# Patient Record
Sex: FEMALE | Race: WHITE | Employment: OTHER | URBAN - METROPOLITAN AREA
[De-identification: names, ages, dates, MRNs, and addresses within clinical notes are randomized per-mention and may not be internally consistent; named-entity substitution may affect disease eponyms.]

---

## 2017-01-25 ENCOUNTER — OFFICE VISIT (OUTPATIENT)
Dept: FAMILY MEDICINE CLINIC | Age: 72
End: 2017-01-25

## 2017-01-25 VITALS
DIASTOLIC BLOOD PRESSURE: 62 MMHG | OXYGEN SATURATION: 97 % | HEART RATE: 100 BPM | SYSTOLIC BLOOD PRESSURE: 144 MMHG | WEIGHT: 151.2 LBS | TEMPERATURE: 98.1 F | BODY MASS INDEX: 25.95 KG/M2

## 2017-01-25 DIAGNOSIS — B02.9 HERPES ZOSTER WITHOUT COMPLICATION: Primary | ICD-10-CM

## 2017-01-25 PROCEDURE — 1123F ACP DISCUSS/DSCN MKR DOCD: CPT | Performed by: FAMILY MEDICINE

## 2017-01-25 PROCEDURE — 99212 OFFICE O/P EST SF 10 MIN: CPT | Performed by: FAMILY MEDICINE

## 2017-01-25 PROCEDURE — 3014F SCREEN MAMMO DOC REV: CPT | Performed by: FAMILY MEDICINE

## 2017-01-25 PROCEDURE — 3017F COLORECTAL CA SCREEN DOC REV: CPT | Performed by: FAMILY MEDICINE

## 2017-01-25 PROCEDURE — G8484 FLU IMMUNIZE NO ADMIN: HCPCS | Performed by: FAMILY MEDICINE

## 2017-01-25 PROCEDURE — 4040F PNEUMOC VAC/ADMIN/RCVD: CPT | Performed by: FAMILY MEDICINE

## 2017-01-25 PROCEDURE — 1090F PRES/ABSN URINE INCON ASSESS: CPT | Performed by: FAMILY MEDICINE

## 2017-01-25 PROCEDURE — G8420 CALC BMI NORM PARAMETERS: HCPCS | Performed by: FAMILY MEDICINE

## 2017-01-25 PROCEDURE — G8400 PT W/DXA NO RESULTS DOC: HCPCS | Performed by: FAMILY MEDICINE

## 2017-01-25 PROCEDURE — G8427 DOCREV CUR MEDS BY ELIG CLIN: HCPCS | Performed by: FAMILY MEDICINE

## 2017-01-25 PROCEDURE — 4004F PT TOBACCO SCREEN RCVD TLK: CPT | Performed by: FAMILY MEDICINE

## 2017-01-25 RX ORDER — METOPROLOL SUCCINATE 25 MG/1
TABLET, EXTENDED RELEASE ORAL
Qty: 90 TABLET | Refills: 3 | Status: SHIPPED | OUTPATIENT
Start: 2017-01-25 | End: 2018-03-06 | Stop reason: SDUPTHER

## 2017-02-17 ENCOUNTER — PRE-PROCEDURE TELEPHONE (OUTPATIENT)
Dept: INTERVENTIONAL RADIOLOGY/VASCULAR | Age: 72
End: 2017-02-17

## 2017-03-14 PROBLEM — N13.30 HYDRONEPHROSIS: Status: ACTIVE | Noted: 2017-03-14

## 2017-03-15 ENCOUNTER — PRE-PROCEDURE TELEPHONE (OUTPATIENT)
Dept: INTERVENTIONAL RADIOLOGY/VASCULAR | Age: 72
End: 2017-03-15

## 2017-03-22 ENCOUNTER — HOSPITAL ENCOUNTER (OUTPATIENT)
Dept: INTERVENTIONAL RADIOLOGY/VASCULAR | Age: 72
Discharge: HOME | End: 2017-03-22
Attending: UROLOGY | Admitting: UROLOGY

## 2017-03-22 VITALS
HEART RATE: 75 BPM | WEIGHT: 156 LBS | OXYGEN SATURATION: 96 % | BODY MASS INDEX: 26.63 KG/M2 | DIASTOLIC BLOOD PRESSURE: 58 MMHG | SYSTOLIC BLOOD PRESSURE: 114 MMHG | RESPIRATION RATE: 16 BRPM | TEMPERATURE: 97.5 F | HEIGHT: 64 IN

## 2017-03-22 DIAGNOSIS — N20.0 RENAL CALCULI: ICD-10-CM

## 2017-03-22 LAB
A/G RATIO: 1.1 (ref 1.1–2.2)
ALBUMIN SERPL-MCNC: 3.9 G/DL (ref 3.4–5)
ALP BLD-CCNC: 155 U/L (ref 40–129)
ALT SERPL-CCNC: 12 U/L (ref 10–40)
ANION GAP SERPL CALCULATED.3IONS-SCNC: 13 MMOL/L (ref 3–16)
APTT: 30 SEC (ref 21–31.8)
AST SERPL-CCNC: 21 U/L (ref 15–37)
BASOPHILS ABSOLUTE: 0 K/UL (ref 0–0.2)
BASOPHILS RELATIVE PERCENT: 0.6 %
BILIRUB SERPL-MCNC: <0.2 MG/DL (ref 0–1)
BUN BLDV-MCNC: 19 MG/DL (ref 7–20)
CALCIUM SERPL-MCNC: 8.7 MG/DL (ref 8.3–10.6)
CHLORIDE BLD-SCNC: 103 MMOL/L (ref 99–110)
CO2: 24 MMOL/L (ref 21–32)
CREAT SERPL-MCNC: 1.7 MG/DL (ref 0.6–1.2)
EOSINOPHILS ABSOLUTE: 0.2 K/UL (ref 0–0.6)
EOSINOPHILS RELATIVE PERCENT: 4.4 %
GFR AFRICAN AMERICAN: 36
GFR NON-AFRICAN AMERICAN: 29
GLOBULIN: 3.7 G/DL
GLUCOSE BLD-MCNC: 102 MG/DL (ref 70–99)
GLUCOSE BLD-MCNC: 99 MG/DL (ref 70–99)
HCT VFR BLD CALC: 40.1 % (ref 36–48)
HEMOGLOBIN: 13.2 G/DL (ref 12–16)
INR BLD: 1.03 (ref 0.85–1.15)
LYMPHOCYTES ABSOLUTE: 1.6 K/UL (ref 1–5.1)
LYMPHOCYTES RELATIVE PERCENT: 31.8 %
MCH RBC QN AUTO: 31.8 PG (ref 26–34)
MCHC RBC AUTO-ENTMCNC: 32.9 G/DL (ref 31–36)
MCV RBC AUTO: 96.6 FL (ref 80–100)
MONOCYTES ABSOLUTE: 0.5 K/UL (ref 0–1.3)
MONOCYTES RELATIVE PERCENT: 10 %
NEUTROPHILS ABSOLUTE: 2.7 K/UL (ref 1.7–7.7)
NEUTROPHILS RELATIVE PERCENT: 53.2 %
PDW BLD-RTO: 13.1 % (ref 12.4–15.4)
PERFORMED ON: NORMAL
PLATELET # BLD: 176 K/UL (ref 135–450)
PMV BLD AUTO: 11.1 FL (ref 5–10.5)
POTASSIUM SERPL-SCNC: 4.1 MMOL/L (ref 3.5–5.1)
PROTHROMBIN TIME: 11.6 SEC (ref 9.6–13)
RBC # BLD: 4.15 M/UL (ref 4–5.2)
SODIUM BLD-SCNC: 140 MMOL/L (ref 136–145)
TOTAL PROTEIN: 7.6 G/DL (ref 6.4–8.2)
WBC # BLD: 5 K/UL (ref 4–11)

## 2017-03-22 PROCEDURE — 93010 ELECTROCARDIOGRAM REPORT: CPT | Performed by: INTERNAL MEDICINE

## 2017-03-22 RX ORDER — SODIUM CHLORIDE 9 MG/ML
INJECTION, SOLUTION INTRAVENOUS CONTINUOUS
Status: DISCONTINUED | OUTPATIENT
Start: 2017-03-22 | End: 2017-03-23 | Stop reason: HOSPADM

## 2017-03-22 RX ORDER — SODIUM CHLORIDE 0.9 % (FLUSH) 0.9 %
10 SYRINGE (ML) INJECTION PRN
Status: DISCONTINUED | OUTPATIENT
Start: 2017-03-22 | End: 2017-03-23 | Stop reason: HOSPADM

## 2017-03-22 RX ORDER — FENTANYL CITRATE 50 UG/ML
25 INJECTION, SOLUTION INTRAMUSCULAR; INTRAVENOUS EVERY 5 MIN PRN
Status: DISCONTINUED | OUTPATIENT
Start: 2017-03-22 | End: 2017-03-23 | Stop reason: HOSPADM

## 2017-03-22 RX ORDER — MORPHINE SULFATE 2 MG/ML
1 INJECTION, SOLUTION INTRAMUSCULAR; INTRAVENOUS EVERY 5 MIN PRN
Status: DISCONTINUED | OUTPATIENT
Start: 2017-03-22 | End: 2017-03-23 | Stop reason: HOSPADM

## 2017-03-22 RX ORDER — SODIUM CHLORIDE 0.9 % (FLUSH) 0.9 %
10 SYRINGE (ML) INJECTION EVERY 12 HOURS SCHEDULED
Status: DISCONTINUED | OUTPATIENT
Start: 2017-03-22 | End: 2017-03-23 | Stop reason: HOSPADM

## 2017-03-22 RX ORDER — DIPHENHYDRAMINE HYDROCHLORIDE 50 MG/ML
25 INJECTION INTRAMUSCULAR; INTRAVENOUS PRN
Status: DISCONTINUED | OUTPATIENT
Start: 2017-03-22 | End: 2017-03-23 | Stop reason: HOSPADM

## 2017-03-22 RX ORDER — HYDROMORPHONE HCL 110MG/55ML
0.5 PATIENT CONTROLLED ANALGESIA SYRINGE INTRAVENOUS EVERY 5 MIN PRN
Status: DISCONTINUED | OUTPATIENT
Start: 2017-03-22 | End: 2017-03-23 | Stop reason: HOSPADM

## 2017-03-22 RX ORDER — ONDANSETRON 2 MG/ML
4 INJECTION INTRAMUSCULAR; INTRAVENOUS
Status: ACTIVE | OUTPATIENT
Start: 2017-03-22 | End: 2017-03-22

## 2017-03-22 ASSESSMENT — PAIN SCALES - GENERAL: PAINLEVEL_OUTOF10: 0

## 2017-03-22 ASSESSMENT — PAIN - FUNCTIONAL ASSESSMENT: PAIN_FUNCTIONAL_ASSESSMENT: 0-10

## 2017-03-22 ASSESSMENT — COPD QUESTIONNAIRES: CAT_SEVERITY: MODERATE

## 2017-03-23 LAB
EKG ATRIAL RATE: 58 BPM
EKG DIAGNOSIS: NORMAL
EKG P AXIS: 31 DEGREES
EKG P-R INTERVAL: 186 MS
EKG Q-T INTERVAL: 428 MS
EKG QRS DURATION: 98 MS
EKG QTC CALCULATION (BAZETT): 420 MS
EKG R AXIS: 39 DEGREES
EKG T AXIS: 78 DEGREES
EKG VENTRICULAR RATE: 58 BPM

## 2017-03-29 ENCOUNTER — HOSPITAL ENCOUNTER (OUTPATIENT)
Dept: INTERVENTIONAL RADIOLOGY/VASCULAR | Age: 72
Discharge: HOME | End: 2017-03-29
Attending: UROLOGY | Admitting: UROLOGY

## 2017-03-29 DIAGNOSIS — N32.0 BLADDER NECK STRICTURE: ICD-10-CM

## 2017-07-18 ENCOUNTER — OFFICE VISIT (OUTPATIENT)
Dept: FAMILY MEDICINE CLINIC | Age: 72
End: 2017-07-18

## 2017-07-18 VITALS
BODY MASS INDEX: 24.72 KG/M2 | DIASTOLIC BLOOD PRESSURE: 80 MMHG | WEIGHT: 144 LBS | SYSTOLIC BLOOD PRESSURE: 130 MMHG | TEMPERATURE: 97.3 F

## 2017-07-18 DIAGNOSIS — L23.9 ALLERGIC CONTACT DERMATITIS, UNSPECIFIED TRIGGER: Primary | ICD-10-CM

## 2017-07-18 DIAGNOSIS — H92.01 OTALGIA, RIGHT: ICD-10-CM

## 2017-07-18 DIAGNOSIS — J30.2 SEASONAL ALLERGIC RHINITIS, UNSPECIFIED ALLERGIC RHINITIS TRIGGER: ICD-10-CM

## 2017-07-18 PROCEDURE — 1090F PRES/ABSN URINE INCON ASSESS: CPT | Performed by: PHYSICIAN ASSISTANT

## 2017-07-18 PROCEDURE — 99213 OFFICE O/P EST LOW 20 MIN: CPT | Performed by: PHYSICIAN ASSISTANT

## 2017-07-18 PROCEDURE — 3017F COLORECTAL CA SCREEN DOC REV: CPT | Performed by: PHYSICIAN ASSISTANT

## 2017-07-18 PROCEDURE — 3014F SCREEN MAMMO DOC REV: CPT | Performed by: PHYSICIAN ASSISTANT

## 2017-07-18 PROCEDURE — G8427 DOCREV CUR MEDS BY ELIG CLIN: HCPCS | Performed by: PHYSICIAN ASSISTANT

## 2017-07-18 PROCEDURE — 4040F PNEUMOC VAC/ADMIN/RCVD: CPT | Performed by: PHYSICIAN ASSISTANT

## 2017-07-18 PROCEDURE — G8420 CALC BMI NORM PARAMETERS: HCPCS | Performed by: PHYSICIAN ASSISTANT

## 2017-07-18 PROCEDURE — 1123F ACP DISCUSS/DSCN MKR DOCD: CPT | Performed by: PHYSICIAN ASSISTANT

## 2017-07-18 PROCEDURE — 4004F PT TOBACCO SCREEN RCVD TLK: CPT | Performed by: PHYSICIAN ASSISTANT

## 2017-07-18 PROCEDURE — G8400 PT W/DXA NO RESULTS DOC: HCPCS | Performed by: PHYSICIAN ASSISTANT

## 2017-07-18 RX ORDER — PREDNISONE 10 MG/1
TABLET ORAL
Qty: 30 TABLET | Refills: 0 | Status: SHIPPED | OUTPATIENT
Start: 2017-07-18 | End: 2018-03-06 | Stop reason: ALTCHOICE

## 2017-07-18 ASSESSMENT — ENCOUNTER SYMPTOMS
SHORTNESS OF BREATH: 1
DIARRHEA: 0
SINUS PRESSURE: 0
WHEEZING: 0
SORE THROAT: 0
NAUSEA: 0
TROUBLE SWALLOWING: 0
COUGH: 1
EYE ITCHING: 1
RHINORRHEA: 1
VOMITING: 0

## 2017-10-18 PROBLEM — R10.32 LEFT LOWER QUADRANT PAIN: Status: ACTIVE | Noted: 2017-10-18

## 2017-10-20 ENCOUNTER — HOSPITAL ENCOUNTER (OUTPATIENT)
Dept: CT IMAGING | Age: 72
Discharge: HOME | End: 2017-10-20
Attending: FAMILY MEDICINE | Admitting: FAMILY MEDICINE

## 2017-10-20 DIAGNOSIS — R10.30 LOWER ABDOMINAL PAIN: ICD-10-CM

## 2017-10-20 LAB
GFR AFRICAN AMERICAN: 31
GFR NON-AFRICAN AMERICAN: 26
PERFORMED ON: ABNORMAL
POC CREATININE: 1.9 MG/DL (ref 0.6–1.2)
POC SAMPLE TYPE: ABNORMAL

## 2018-03-06 ENCOUNTER — OFFICE VISIT (OUTPATIENT)
Dept: FAMILY MEDICINE CLINIC | Age: 73
End: 2018-03-06

## 2018-03-06 VITALS
DIASTOLIC BLOOD PRESSURE: 80 MMHG | SYSTOLIC BLOOD PRESSURE: 124 MMHG | BODY MASS INDEX: 20.49 KG/M2 | WEIGHT: 123 LBS | OXYGEN SATURATION: 95 % | HEART RATE: 109 BPM | HEIGHT: 65 IN

## 2018-03-06 DIAGNOSIS — M25.551 RIGHT HIP PAIN: ICD-10-CM

## 2018-03-06 DIAGNOSIS — J45.20 INTERMITTENT ASTHMA WITHOUT COMPLICATION, UNSPECIFIED ASTHMA SEVERITY: Primary | ICD-10-CM

## 2018-03-06 DIAGNOSIS — Z85.51 HISTORY OF PRIMARY BLADDER CANCER: ICD-10-CM

## 2018-03-06 DIAGNOSIS — R73.02 IMPAIRED GLUCOSE TOLERANCE: ICD-10-CM

## 2018-03-06 DIAGNOSIS — S09.90XA INJURY OF HEAD, INITIAL ENCOUNTER: ICD-10-CM

## 2018-03-06 DIAGNOSIS — S00.83XA CONTUSION OF CHIN, INITIAL ENCOUNTER: ICD-10-CM

## 2018-03-06 DIAGNOSIS — S20.211A CONTUSION OF RIB ON RIGHT SIDE, INITIAL ENCOUNTER: ICD-10-CM

## 2018-03-06 DIAGNOSIS — F33.40 RECURRENT MAJOR DEPRESSIVE DISORDER, IN REMISSION (HCC): ICD-10-CM

## 2018-03-06 DIAGNOSIS — I10 ESSENTIAL HYPERTENSION: ICD-10-CM

## 2018-03-06 DIAGNOSIS — R42 DIZZINESS: ICD-10-CM

## 2018-03-06 DIAGNOSIS — Z12.11 SCREEN FOR COLON CANCER: ICD-10-CM

## 2018-03-06 DIAGNOSIS — K59.09 CHRONIC CONSTIPATION: ICD-10-CM

## 2018-03-06 PROBLEM — R10.32 LEFT LOWER QUADRANT PAIN: Status: RESOLVED | Noted: 2017-10-18 | Resolved: 2018-03-06

## 2018-03-06 PROBLEM — N13.30 HYDRONEPHROSIS: Status: RESOLVED | Noted: 2017-03-14 | Resolved: 2018-03-06

## 2018-03-06 PROCEDURE — G8427 DOCREV CUR MEDS BY ELIG CLIN: HCPCS | Performed by: PHYSICIAN ASSISTANT

## 2018-03-06 PROCEDURE — G8400 PT W/DXA NO RESULTS DOC: HCPCS | Performed by: PHYSICIAN ASSISTANT

## 2018-03-06 PROCEDURE — 1090F PRES/ABSN URINE INCON ASSESS: CPT | Performed by: PHYSICIAN ASSISTANT

## 2018-03-06 PROCEDURE — 4004F PT TOBACCO SCREEN RCVD TLK: CPT | Performed by: PHYSICIAN ASSISTANT

## 2018-03-06 PROCEDURE — 3017F COLORECTAL CA SCREEN DOC REV: CPT | Performed by: PHYSICIAN ASSISTANT

## 2018-03-06 PROCEDURE — 1123F ACP DISCUSS/DSCN MKR DOCD: CPT | Performed by: PHYSICIAN ASSISTANT

## 2018-03-06 PROCEDURE — 99214 OFFICE O/P EST MOD 30 MIN: CPT | Performed by: PHYSICIAN ASSISTANT

## 2018-03-06 PROCEDURE — G8420 CALC BMI NORM PARAMETERS: HCPCS | Performed by: PHYSICIAN ASSISTANT

## 2018-03-06 PROCEDURE — 4040F PNEUMOC VAC/ADMIN/RCVD: CPT | Performed by: PHYSICIAN ASSISTANT

## 2018-03-06 PROCEDURE — G8484 FLU IMMUNIZE NO ADMIN: HCPCS | Performed by: PHYSICIAN ASSISTANT

## 2018-03-06 RX ORDER — ALBUTEROL SULFATE 90 UG/1
2 AEROSOL, METERED RESPIRATORY (INHALATION) EVERY 6 HOURS PRN
Qty: 1 INHALER | Refills: 3 | Status: SHIPPED | OUTPATIENT
Start: 2018-03-06 | End: 2018-09-11

## 2018-03-06 RX ORDER — AMOXICILLIN 250 MG
1 CAPSULE ORAL 2 TIMES DAILY
Qty: 60 TABLET | Refills: 5 | Status: SHIPPED | OUTPATIENT
Start: 2018-03-06 | End: 2018-09-11

## 2018-03-06 RX ORDER — VENLAFAXINE 75 MG/1
75 TABLET ORAL 2 TIMES DAILY
Qty: 60 TABLET | Refills: 5 | Status: SHIPPED | OUTPATIENT
Start: 2018-03-06 | End: 2019-07-10 | Stop reason: SDUPTHER

## 2018-03-06 RX ORDER — METOPROLOL SUCCINATE 25 MG/1
TABLET, EXTENDED RELEASE ORAL
Qty: 90 TABLET | Refills: 3 | Status: ON HOLD | OUTPATIENT
Start: 2018-03-06 | End: 2020-01-14 | Stop reason: SDDI

## 2018-03-06 ASSESSMENT — ENCOUNTER SYMPTOMS
VOICE CHANGE: 0
CHEST TIGHTNESS: 0
ABDOMINAL PAIN: 0
EYE PAIN: 0
BACK PAIN: 0
CONSTIPATION: 0
SORE THROAT: 0
SHORTNESS OF BREATH: 0
COUGH: 0
COLOR CHANGE: 1
DIARRHEA: 0
TROUBLE SWALLOWING: 0

## 2018-03-06 ASSESSMENT — PATIENT HEALTH QUESTIONNAIRE - PHQ9
1. LITTLE INTEREST OR PLEASURE IN DOING THINGS: 1
2. FEELING DOWN, DEPRESSED OR HOPELESS: 1
SUM OF ALL RESPONSES TO PHQ QUESTIONS 1-9: 2
SUM OF ALL RESPONSES TO PHQ9 QUESTIONS 1 & 2: 2

## 2018-03-07 ENCOUNTER — TELEPHONE (OUTPATIENT)
Dept: FAMILY MEDICINE CLINIC | Age: 73
End: 2018-03-07

## 2018-03-07 ENCOUNTER — HOSPITAL ENCOUNTER (OUTPATIENT)
Dept: CT IMAGING | Age: 73
Discharge: HOME | End: 2018-03-07
Attending: PHYSICIAN ASSISTANT | Admitting: PHYSICIAN ASSISTANT

## 2018-03-07 DIAGNOSIS — R07.81 RIB PAIN ON RIGHT SIDE: ICD-10-CM

## 2018-03-07 DIAGNOSIS — S09.90XA INJURY OF HEAD, INITIAL ENCOUNTER: Primary | ICD-10-CM

## 2018-03-07 DIAGNOSIS — R42 DIZZINESS: ICD-10-CM

## 2018-03-07 DIAGNOSIS — S09.90XA INJURY OF HEAD: ICD-10-CM

## 2018-03-07 DIAGNOSIS — M25.551 RIGHT HIP PAIN: ICD-10-CM

## 2018-03-07 DIAGNOSIS — S09.90XA INJURY OF HEAD, INITIAL ENCOUNTER: ICD-10-CM

## 2018-03-07 LAB
A/G RATIO: 0.7 (ref 1.1–2.2)
ALBUMIN SERPL-MCNC: 3.2 G/DL (ref 3.4–5)
ALP BLD-CCNC: 141 U/L (ref 40–129)
ALT SERPL-CCNC: 16 U/L (ref 10–40)
ANION GAP SERPL CALCULATED.3IONS-SCNC: 16 MMOL/L (ref 3–16)
AST SERPL-CCNC: 27 U/L (ref 15–37)
BASOPHILS ABSOLUTE: 0.1 K/UL (ref 0–0.2)
BASOPHILS RELATIVE PERCENT: 1.5 %
BILIRUB SERPL-MCNC: 0.5 MG/DL (ref 0–1)
BUN BLDV-MCNC: 30 MG/DL (ref 7–20)
CALCIUM SERPL-MCNC: 8.9 MG/DL (ref 8.3–10.6)
CHLORIDE BLD-SCNC: 98 MMOL/L (ref 99–110)
CHOLESTEROL, TOTAL: 133 MG/DL (ref 0–199)
CO2: 24 MMOL/L (ref 21–32)
CREAT SERPL-MCNC: 1.8 MG/DL (ref 0.6–1.2)
CREATININE URINE: 123.2 MG/DL (ref 28–259)
EOSINOPHILS ABSOLUTE: 0.4 K/UL (ref 0–0.6)
EOSINOPHILS RELATIVE PERCENT: 4.7 %
ESTIMATED AVERAGE GLUCOSE: 99.7 MG/DL
GFR AFRICAN AMERICAN: 33
GFR NON-AFRICAN AMERICAN: 27
GLOBULIN: 4.7 G/DL
GLUCOSE BLD-MCNC: 105 MG/DL (ref 70–99)
HBA1C MFR BLD: 5.1 %
HCT VFR BLD CALC: 38.6 % (ref 36–48)
HDLC SERPL-MCNC: 49 MG/DL (ref 40–60)
HEMOGLOBIN: 13.1 G/DL (ref 12–16)
LDL CHOLESTEROL CALCULATED: 59 MG/DL
LYMPHOCYTES ABSOLUTE: 2.7 K/UL (ref 1–5.1)
LYMPHOCYTES RELATIVE PERCENT: 33.1 %
MCH RBC QN AUTO: 32.4 PG (ref 26–34)
MCHC RBC AUTO-ENTMCNC: 33.9 G/DL (ref 31–36)
MCV RBC AUTO: 95.4 FL (ref 80–100)
MICROALBUMIN UR-MCNC: 86.9 MG/DL
MICROALBUMIN/CREAT UR-RTO: 705.4 MG/G (ref 0–30)
MONOCYTES ABSOLUTE: 0.6 K/UL (ref 0–1.3)
MONOCYTES RELATIVE PERCENT: 7.8 %
NEUTROPHILS ABSOLUTE: 4.4 K/UL (ref 1.7–7.7)
NEUTROPHILS RELATIVE PERCENT: 52.9 %
PDW BLD-RTO: 13.3 % (ref 12.4–15.4)
PLATELET # BLD: 246 K/UL (ref 135–450)
PMV BLD AUTO: 10.6 FL (ref 5–10.5)
POTASSIUM SERPL-SCNC: 4 MMOL/L (ref 3.5–5.1)
RBC # BLD: 4.05 M/UL (ref 4–5.2)
SODIUM BLD-SCNC: 138 MMOL/L (ref 136–145)
TOTAL PROTEIN: 7.9 G/DL (ref 6.4–8.2)
TRIGL SERPL-MCNC: 127 MG/DL (ref 0–150)
TSH SERPL DL<=0.05 MIU/L-ACNC: 4.01 UIU/ML (ref 0.27–4.2)
VLDLC SERPL CALC-MCNC: 25 MG/DL
WBC # BLD: 8.2 K/UL (ref 4–11)

## 2018-03-07 NOTE — TELEPHONE ENCOUNTER
Advised pt to head to the hospital to get all her testing done and that they are squeezing her in for CT head. Order put in [address] [place] [address] Rd.

## 2018-03-08 ENCOUNTER — TELEPHONE (OUTPATIENT)
Dept: FAMILY MEDICINE CLINIC | Age: 73
End: 2018-03-08

## 2018-03-08 DIAGNOSIS — R77.8 ABNORMAL SERUM PROTEIN TEST: ICD-10-CM

## 2018-03-08 DIAGNOSIS — C65.2 TRANSITIONAL CELL CARCINOMA OF LEFT RENAL PELVIS (HCC): Primary | ICD-10-CM

## 2018-09-11 ENCOUNTER — OFFICE VISIT (OUTPATIENT)
Dept: FAMILY MEDICINE CLINIC | Age: 73
End: 2018-09-11

## 2018-09-11 VITALS
DIASTOLIC BLOOD PRESSURE: 68 MMHG | HEART RATE: 58 BPM | SYSTOLIC BLOOD PRESSURE: 138 MMHG | WEIGHT: 129.6 LBS | BODY MASS INDEX: 21.9 KG/M2

## 2018-09-11 DIAGNOSIS — Z87.891 PERSONAL HISTORY OF TOBACCO USE: ICD-10-CM

## 2018-09-11 DIAGNOSIS — M85.80 OSTEOPENIA, UNSPECIFIED LOCATION: ICD-10-CM

## 2018-09-11 DIAGNOSIS — Z12.11 COLON CANCER SCREENING: ICD-10-CM

## 2018-09-11 DIAGNOSIS — E53.8 B12 DEFICIENCY: ICD-10-CM

## 2018-09-11 DIAGNOSIS — E78.49 OTHER HYPERLIPIDEMIA: ICD-10-CM

## 2018-09-11 DIAGNOSIS — M81.0 AGE-RELATED OSTEOPOROSIS WITHOUT CURRENT PATHOLOGICAL FRACTURE: ICD-10-CM

## 2018-09-11 DIAGNOSIS — R73.01 IMPAIRED FASTING GLUCOSE: Primary | ICD-10-CM

## 2018-09-11 PROCEDURE — 1090F PRES/ABSN URINE INCON ASSESS: CPT | Performed by: INTERNAL MEDICINE

## 2018-09-11 PROCEDURE — 1123F ACP DISCUSS/DSCN MKR DOCD: CPT | Performed by: INTERNAL MEDICINE

## 2018-09-11 PROCEDURE — G8400 PT W/DXA NO RESULTS DOC: HCPCS | Performed by: INTERNAL MEDICINE

## 2018-09-11 PROCEDURE — 99214 OFFICE O/P EST MOD 30 MIN: CPT | Performed by: INTERNAL MEDICINE

## 2018-09-11 PROCEDURE — G8420 CALC BMI NORM PARAMETERS: HCPCS | Performed by: INTERNAL MEDICINE

## 2018-09-11 PROCEDURE — 4004F PT TOBACCO SCREEN RCVD TLK: CPT | Performed by: INTERNAL MEDICINE

## 2018-09-11 PROCEDURE — 1101F PT FALLS ASSESS-DOCD LE1/YR: CPT | Performed by: INTERNAL MEDICINE

## 2018-09-11 PROCEDURE — 4040F PNEUMOC VAC/ADMIN/RCVD: CPT | Performed by: INTERNAL MEDICINE

## 2018-09-11 PROCEDURE — G8427 DOCREV CUR MEDS BY ELIG CLIN: HCPCS | Performed by: INTERNAL MEDICINE

## 2018-09-11 PROCEDURE — 3017F COLORECTAL CA SCREEN DOC REV: CPT | Performed by: INTERNAL MEDICINE

## 2018-09-11 RX ORDER — CYANOCOBALAMIN (VITAMIN B-12) 1000 MCG
1000 TABLET, EXTENDED RELEASE ORAL DAILY
Qty: 90 TABLET | Refills: 3 | Status: SHIPPED | OUTPATIENT
Start: 2018-09-11 | End: 2019-03-22 | Stop reason: ALTCHOICE

## 2018-09-11 ASSESSMENT — ENCOUNTER SYMPTOMS
VOMITING: 0
CONSTIPATION: 0
COUGH: 1
CHEST TIGHTNESS: 0
ABDOMINAL PAIN: 0
SINUS PRESSURE: 0
NAUSEA: 0
SHORTNESS OF BREATH: 0
DIARRHEA: 0

## 2018-09-11 NOTE — PROGRESS NOTES
tenderness. There is no rebound and no guarding. Musculoskeletal: Normal range of motion. She exhibits no edema. Lymphadenopathy:     She has no cervical adenopathy. Neurological: She is alert and oriented to person, place, and time. No cranial nerve deficit. Skin: Skin is warm and dry. No rash noted. She is not diaphoretic. Psychiatric: She has a normal mood and affect. Her behavior is normal.   Vitals reviewed. Visual inspection: Healthy, mild onychomycosis  Deformity/amputation: absent  Skin lesions/pre-ulcerative calluses: absent  Edema: right- negative, left- negative    Sensory exam:  Monofilament sensation: normal  (minimum of 5 random plantar locations tested, avoiding callused areas - > 1 area with absence of sensation is + for neuropathy)    Plus at least one of the following:  Pulses: normal,   Pinprick: N/A      Assessment/Plan    Kathy Saunders was seen today for 6 month follow-up. Diagnoses and all orders for this visit:    Impaired fasting glucose  -     CBC Auto Differential; Future  -     Basic Metabolic Panel; Future  -     Hemoglobin A1C; Future  -     Lipid Panel; Future  -     Microalbumin / Creatinine Urine Ratio; Future  - DM foot exam done    B12 deficiency  -     Vitamin B12 & Folate; Future  -     Cyanocobalamin (VITAMIN B-12) 1000 MCG extended release tablet; Take 1 tablet by mouth daily  Refill B12 script (has not been taking recently)    Osteopenia, unspecified location  -     Vitamin D 25 Hydroxy; Future  -     DEXA Bone Density Axial Skeleton; Future  Mother with osteoporosis    Other hyperlipidemia   -     Lipid Panel; Future  Continue fish oil.  Discuss statins pending repeat lab results  The 10-year ASCVD risk score (Sabine Moreland, et al., 2013) is: 42.5%    Values used to calculate the score:      Age: 74 years      Sex: Female      Is Non- : No      Diabetic: Yes      Tobacco smoker: Yes      Systolic Blood Pressure: 983 mmHg      Is BP treated: No HDL Cholesterol: 49 mg/dL      Total Cholesterol: 133 mg/dL      Personal history of tobacco use  -     Low Dose Chest CT - (3-6 month)  follow-up to CT lung screening only  Not ready to quit smoking, declined smoking cessation information    Colon cancer screening  -     POCT Fecal Immunochemical Test (FIT); Future    Age-related osteoporosis without current pathological fracture   -     DEXA Bone Density Axial Skeleton; Future    Age related vaccines  - Recovering from recent bronchitis requiring PO steroids, readdress at later time    Health Maintenance Due   Topic Date Due    Diabetic retinal exam  01/14/1955    Shingles Vaccine (1 of 2 - 2 Dose Series) 01/14/1993    DTaP/Tdap/Td vaccine (1 - Tdap) 10/05/2006    DEXA (modify frequency per FRAX score)  01/14/2008    Colon cancer screen colonoscopy  01/09/2016    Flu vaccine (1) 09/01/2018           Return in about 3 months (around 12/11/2018).

## 2019-02-26 NOTE — PROGRESS NOTES
Chart(s)/Patient
Consider PT consult due to fall
I have reviewed the history and physical note and findings.
the lungs every 6 hours as needed for Wheezing    Recurrent major depressive disorder, in remission ([facility] [address]  -     venlafaxine (EFFEXOR) 75 MG tablet; Take 1 tablet by mouth 2 times daily    Impaired glucose tolerance    Essential hypertension  -     metoprolol succinate (TOPROL XL) 25 MG extended release tablet; TAKE ONE TABLET BY MOUTH ONCE DAILY    History of primary bladder cancer ( Dr Roberto Cosme)    Chronic constipation  -     senna-docusate (PERICOLACE) 8.6-50 MG per tablet; Take 1 tablet by mouth 2 times daily    Contusion of rib on right side, initial encounter    Contusion of chin, initial encounter    Right hip pain    Injury of head, initial encounter    Dizziness    Screen for colon cancer  -     COLONOSCOPY (Screening)             Plan:      Get ct scan on head, labs and xrays tomorrow. She refused to go today. Refilled meds. HTN and depression, return in 6 months or sooner if needed.  She really needs to get into her urologist.

## 2019-03-22 ENCOUNTER — HOSPITAL ENCOUNTER (INPATIENT)
Age: 74
LOS: 2 days | Discharge: HOME | DRG: 190 | End: 2019-03-24
Attending: EMERGENCY MEDICINE | Admitting: HOSPITALIST
Payer: MEDICARE

## 2019-03-22 ENCOUNTER — APPOINTMENT (OUTPATIENT)
Dept: GENERAL RADIOLOGY | Age: 74
DRG: 190 | End: 2019-03-22
Payer: MEDICARE

## 2019-03-22 DIAGNOSIS — J44.1 COPD WITH ACUTE EXACERBATION (HCC): Primary | ICD-10-CM

## 2019-03-22 DIAGNOSIS — J96.01 ACUTE RESPIRATORY FAILURE WITH HYPOXIA (HCC): ICD-10-CM

## 2019-03-22 LAB
A/G RATIO: 0.8 (ref 1.1–2.2)
ALBUMIN SERPL-MCNC: 3.5 G/DL (ref 3.4–5)
ALP BLD-CCNC: 177 U/L (ref 40–129)
ALT SERPL-CCNC: 12 U/L (ref 10–40)
ANION GAP SERPL CALCULATED.3IONS-SCNC: 11 MMOL/L (ref 3–16)
AST SERPL-CCNC: 23 U/L (ref 15–37)
BASOPHILS ABSOLUTE: 0 K/UL (ref 0–0.2)
BASOPHILS RELATIVE PERCENT: 0.7 %
BILIRUB SERPL-MCNC: <0.2 MG/DL (ref 0–1)
BUN BLDV-MCNC: 18 MG/DL (ref 7–20)
CALCIUM SERPL-MCNC: 8.7 MG/DL (ref 8.3–10.6)
CHLORIDE BLD-SCNC: 104 MMOL/L (ref 99–110)
CO2: 24 MMOL/L (ref 21–32)
CREAT SERPL-MCNC: 1.4 MG/DL (ref 0.6–1.2)
EKG ATRIAL RATE: 74 BPM
EKG DIAGNOSIS: NORMAL
EKG P AXIS: 68 DEGREES
EKG P-R INTERVAL: 176 MS
EKG Q-T INTERVAL: 436 MS
EKG QRS DURATION: 92 MS
EKG QTC CALCULATION (BAZETT): 483 MS
EKG R AXIS: 34 DEGREES
EKG T AXIS: 67 DEGREES
EKG VENTRICULAR RATE: 74 BPM
EOSINOPHILS ABSOLUTE: 0.4 K/UL (ref 0–0.6)
EOSINOPHILS RELATIVE PERCENT: 5.6 %
GFR AFRICAN AMERICAN: 44
GFR NON-AFRICAN AMERICAN: 37
GLOBULIN: 4.3 G/DL
GLUCOSE BLD-MCNC: 114 MG/DL (ref 70–99)
HCT VFR BLD CALC: 39.1 % (ref 36–48)
HEMOGLOBIN: 12.6 G/DL (ref 12–16)
INR BLD: 1.04 (ref 0.86–1.14)
LACTIC ACID, SEPSIS: 0.8 MMOL/L (ref 0.4–1.9)
LYMPHOCYTES ABSOLUTE: 1.2 K/UL (ref 1–5.1)
LYMPHOCYTES RELATIVE PERCENT: 17.9 %
MCH RBC QN AUTO: 31.3 PG (ref 26–34)
MCHC RBC AUTO-ENTMCNC: 32.3 G/DL (ref 31–36)
MCV RBC AUTO: 96.8 FL (ref 80–100)
MONOCYTES ABSOLUTE: 0.5 K/UL (ref 0–1.3)
MONOCYTES RELATIVE PERCENT: 7.1 %
NEUTROPHILS ABSOLUTE: 4.7 K/UL (ref 1.7–7.7)
NEUTROPHILS RELATIVE PERCENT: 68.7 %
PDW BLD-RTO: 13 % (ref 12.4–15.4)
PLATELET # BLD: 218 K/UL (ref 135–450)
PMV BLD AUTO: 10.4 FL (ref 5–10.5)
POTASSIUM REFLEX MAGNESIUM: 4 MMOL/L (ref 3.5–5.1)
PRO-BNP: 1887 PG/ML (ref 0–449)
PROTHROMBIN TIME: 11.9 SEC (ref 9.8–13)
RAPID INFLUENZA  B AGN: NEGATIVE
RAPID INFLUENZA A AGN: NEGATIVE
RBC # BLD: 4.04 M/UL (ref 4–5.2)
SODIUM BLD-SCNC: 139 MMOL/L (ref 136–145)
TOTAL PROTEIN: 7.8 G/DL (ref 6.4–8.2)
TROPONIN: <0.01 NG/ML
WBC # BLD: 6.8 K/UL (ref 4–11)

## 2019-03-22 PROCEDURE — 84484 ASSAY OF TROPONIN QUANT: CPT

## 2019-03-22 PROCEDURE — 6360000002 HC RX W HCPCS: Performed by: PHYSICIAN ASSISTANT

## 2019-03-22 PROCEDURE — 94640 AIRWAY INHALATION TREATMENT: CPT

## 2019-03-22 PROCEDURE — 1200000000 HC SEMI PRIVATE

## 2019-03-22 PROCEDURE — 6370000000 HC RX 637 (ALT 250 FOR IP): Performed by: PHYSICIAN ASSISTANT

## 2019-03-22 PROCEDURE — 83605 ASSAY OF LACTIC ACID: CPT

## 2019-03-22 PROCEDURE — 80053 COMPREHEN METABOLIC PANEL: CPT

## 2019-03-22 PROCEDURE — 71045 X-RAY EXAM CHEST 1 VIEW: CPT

## 2019-03-22 PROCEDURE — 87804 INFLUENZA ASSAY W/OPTIC: CPT

## 2019-03-22 PROCEDURE — 96365 THER/PROPH/DIAG IV INF INIT: CPT

## 2019-03-22 PROCEDURE — 87186 SC STD MICRODIL/AGAR DIL: CPT

## 2019-03-22 PROCEDURE — 2580000003 HC RX 258

## 2019-03-22 PROCEDURE — 6370000000 HC RX 637 (ALT 250 FOR IP): Performed by: HOSPITALIST

## 2019-03-22 PROCEDURE — 96375 TX/PRO/DX INJ NEW DRUG ADDON: CPT

## 2019-03-22 PROCEDURE — 6360000002 HC RX W HCPCS: Performed by: HOSPITALIST

## 2019-03-22 PROCEDURE — 85025 COMPLETE CBC W/AUTO DIFF WBC: CPT

## 2019-03-22 PROCEDURE — 96366 THER/PROPH/DIAG IV INF ADDON: CPT

## 2019-03-22 PROCEDURE — 85610 PROTHROMBIN TIME: CPT

## 2019-03-22 PROCEDURE — 93005 ELECTROCARDIOGRAM TRACING: CPT | Performed by: EMERGENCY MEDICINE

## 2019-03-22 PROCEDURE — 87040 BLOOD CULTURE FOR BACTERIA: CPT

## 2019-03-22 PROCEDURE — 93010 ELECTROCARDIOGRAM REPORT: CPT | Performed by: INTERNAL MEDICINE

## 2019-03-22 PROCEDURE — 2580000003 HC RX 258: Performed by: PHYSICIAN ASSISTANT

## 2019-03-22 PROCEDURE — 94760 N-INVAS EAR/PLS OXIMETRY 1: CPT

## 2019-03-22 PROCEDURE — 83880 ASSAY OF NATRIURETIC PEPTIDE: CPT

## 2019-03-22 PROCEDURE — 87077 CULTURE AEROBIC IDENTIFY: CPT

## 2019-03-22 PROCEDURE — 99285 EMERGENCY DEPT VISIT HI MDM: CPT

## 2019-03-22 PROCEDURE — 87801 DETECT AGNT MULT DNA AMPLI: CPT

## 2019-03-22 RX ORDER — ONDANSETRON 2 MG/ML
4 INJECTION INTRAMUSCULAR; INTRAVENOUS EVERY 6 HOURS PRN
Status: DISCONTINUED | OUTPATIENT
Start: 2019-03-22 | End: 2019-03-24 | Stop reason: HOSPADM

## 2019-03-22 RX ORDER — SODIUM CHLORIDE 0.9 % (FLUSH) 0.9 %
10 SYRINGE (ML) INJECTION EVERY 12 HOURS SCHEDULED
Status: DISCONTINUED | OUTPATIENT
Start: 2019-03-22 | End: 2019-03-24 | Stop reason: HOSPADM

## 2019-03-22 RX ORDER — SODIUM CHLORIDE 9 MG/ML
INJECTION, SOLUTION INTRAVENOUS
Status: COMPLETED
Start: 2019-03-22 | End: 2019-03-22

## 2019-03-22 RX ORDER — IPRATROPIUM BROMIDE AND ALBUTEROL SULFATE 2.5; .5 MG/3ML; MG/3ML
1 SOLUTION RESPIRATORY (INHALATION) ONCE
Status: COMPLETED | OUTPATIENT
Start: 2019-03-22 | End: 2019-03-22

## 2019-03-22 RX ORDER — AZITHROMYCIN 250 MG/1
250 TABLET, FILM COATED ORAL DAILY
Status: DISCONTINUED | OUTPATIENT
Start: 2019-03-24 | End: 2019-03-24 | Stop reason: HOSPADM

## 2019-03-22 RX ORDER — SODIUM CHLORIDE 9 MG/ML
INJECTION, SOLUTION INTRAVENOUS CONTINUOUS
Status: DISCONTINUED | OUTPATIENT
Start: 2019-03-22 | End: 2019-03-24

## 2019-03-22 RX ORDER — ACETAMINOPHEN 500 MG
500 TABLET ORAL EVERY 6 HOURS PRN
Status: DISCONTINUED | OUTPATIENT
Start: 2019-03-22 | End: 2019-03-24 | Stop reason: HOSPADM

## 2019-03-22 RX ORDER — UBIDECARENONE 75 MG
1000 CAPSULE ORAL DAILY
Status: DISCONTINUED | OUTPATIENT
Start: 2019-03-23 | End: 2019-03-24 | Stop reason: HOSPADM

## 2019-03-22 RX ORDER — METHYLPREDNISOLONE SODIUM SUCCINATE 125 MG/2ML
125 INJECTION, POWDER, LYOPHILIZED, FOR SOLUTION INTRAMUSCULAR; INTRAVENOUS ONCE
Status: COMPLETED | OUTPATIENT
Start: 2019-03-22 | End: 2019-03-22

## 2019-03-22 RX ORDER — IPRATROPIUM BROMIDE AND ALBUTEROL SULFATE 2.5; .5 MG/3ML; MG/3ML
1 SOLUTION RESPIRATORY (INHALATION)
Status: DISCONTINUED | OUTPATIENT
Start: 2019-03-22 | End: 2019-03-24 | Stop reason: HOSPADM

## 2019-03-22 RX ORDER — VENLAFAXINE 37.5 MG/1
75 TABLET ORAL 2 TIMES DAILY
Status: DISCONTINUED | OUTPATIENT
Start: 2019-03-22 | End: 2019-03-24 | Stop reason: HOSPADM

## 2019-03-22 RX ORDER — SODIUM CHLORIDE 0.9 % (FLUSH) 0.9 %
10 SYRINGE (ML) INJECTION PRN
Status: DISCONTINUED | OUTPATIENT
Start: 2019-03-22 | End: 2019-03-24 | Stop reason: HOSPADM

## 2019-03-22 RX ORDER — AZITHROMYCIN 500 MG/1
500 INJECTION, POWDER, LYOPHILIZED, FOR SOLUTION INTRAVENOUS ONCE
Status: DISCONTINUED | OUTPATIENT
Start: 2019-03-22 | End: 2019-03-22 | Stop reason: ALTCHOICE

## 2019-03-22 RX ORDER — METHYLPREDNISOLONE SODIUM SUCCINATE 40 MG/ML
40 INJECTION, POWDER, LYOPHILIZED, FOR SOLUTION INTRAMUSCULAR; INTRAVENOUS DAILY
Status: DISCONTINUED | OUTPATIENT
Start: 2019-03-23 | End: 2019-03-24 | Stop reason: HOSPADM

## 2019-03-22 RX ORDER — AZITHROMYCIN 250 MG/1
500 TABLET, FILM COATED ORAL DAILY
Status: COMPLETED | OUTPATIENT
Start: 2019-03-23 | End: 2019-03-23

## 2019-03-22 RX ORDER — METOPROLOL SUCCINATE 25 MG/1
25 TABLET, EXTENDED RELEASE ORAL DAILY
Status: DISCONTINUED | OUTPATIENT
Start: 2019-03-22 | End: 2019-03-24 | Stop reason: HOSPADM

## 2019-03-22 RX ADMIN — IPRATROPIUM BROMIDE AND ALBUTEROL SULFATE 1 AMPULE: .5; 3 SOLUTION RESPIRATORY (INHALATION) at 19:48

## 2019-03-22 RX ADMIN — SODIUM CHLORIDE: 9 INJECTION, SOLUTION INTRAVENOUS at 15:14

## 2019-03-22 RX ADMIN — METHYLPREDNISOLONE SODIUM SUCCINATE 125 MG: 125 INJECTION, POWDER, FOR SOLUTION INTRAMUSCULAR; INTRAVENOUS at 12:34

## 2019-03-22 RX ADMIN — ALBUTEROL SULFATE 5 MG: 2.5 SOLUTION RESPIRATORY (INHALATION) at 12:13

## 2019-03-22 RX ADMIN — Medication 1 G: at 13:16

## 2019-03-22 RX ADMIN — ENOXAPARIN SODIUM 30 MG: 30 INJECTION SUBCUTANEOUS at 21:36

## 2019-03-22 RX ADMIN — IPRATROPIUM BROMIDE AND ALBUTEROL SULFATE 1 AMPULE: .5; 3 SOLUTION RESPIRATORY (INHALATION) at 15:39

## 2019-03-22 RX ADMIN — VENLAFAXINE 75 MG: 37.5 TABLET ORAL at 21:35

## 2019-03-22 RX ADMIN — AZITHROMYCIN MONOHYDRATE 500 MG: 500 INJECTION, POWDER, LYOPHILIZED, FOR SOLUTION INTRAVENOUS at 13:42

## 2019-03-22 RX ADMIN — IPRATROPIUM BROMIDE AND ALBUTEROL SULFATE 1 AMPULE: .5; 3 SOLUTION RESPIRATORY (INHALATION) at 12:13

## 2019-03-22 RX ADMIN — METOPROLOL SUCCINATE 25 MG: 25 TABLET, FILM COATED, EXTENDED RELEASE ORAL at 18:28

## 2019-03-22 ASSESSMENT — ENCOUNTER SYMPTOMS
COLOR CHANGE: 0
APNEA: 0
NAUSEA: 0
COUGH: 1
CONSTIPATION: 0
WHEEZING: 1
DIARRHEA: 0
STRIDOR: 0
VOMITING: 0
CHOKING: 0
ABDOMINAL PAIN: 0
CHEST TIGHTNESS: 1
SHORTNESS OF BREATH: 1

## 2019-03-22 ASSESSMENT — PAIN SCALES - GENERAL
PAINLEVEL_OUTOF10: 0
PAINLEVEL_OUTOF10: 3
PAINLEVEL_OUTOF10: 0

## 2019-03-22 ASSESSMENT — PAIN DESCRIPTION - LOCATION: LOCATION: HEAD

## 2019-03-22 NOTE — ED NOTES
Pharmacy Medication History Note      List of current medications patient is taking is complete. Source of information: [employer]    Changes made to medication list:  Medications flagged for removal (include reason, ex. noncompliance):  N/A    Medications removed (include reason, ex. therapy complete or physician discontinued): Albuterol- therapy complete  Multivit- therapy complete    Medications added/doses adjusted:  N/A    Other notes (ex. Recent course of antibiotics, Coumadin dosing):  Denies use of other OTC or herbal medications. Last dose times updated.    Arlene

## 2019-03-22 NOTE — ED NOTES
Pt reports breathing as improved as well as pain in ribs with coughing and inspiration. Denies need for any further medications. Noted resp rate decreased to 20 and oxygen sat maintaining high 90s. Oxygen decreased to 2liters, updated on poc and call light reinforced.       Ronnie Pickens RN  03/22/19 5807

## 2019-03-22 NOTE — FLOWSHEET NOTE
03/22/19 1752   Encounter Summary   Services provided to: Patient and family together   Referral/Consult From: Nurse   Support System Family members   Place of [facility] No   Continue Visiting No  (Pt will call if support need or for AD)   Complexity of Encounter Low   Length of Encounter 30 minutes   Spiritual/Latter-day   Type Spiritual support   Assessment Calm; Approachable; Hopeful;Peaceful   Intervention Active listening;Explored feelings, thoughts, concerns;Explored coping resources;Prayer;Scripture;Sustaining presence/ Ministry of presence   Outcome Comfort;Expressed gratitude   Advance Directives (For Healthcare)   Pre-existing DNR Comfort Care/DNR Arrest/DNI Order No   Healthcare Directive No, patient does not have an advance directive for healthcare treatment   Information on Healthcare Directives Requested Yes   Patient Requests Assistance No  (Will discuss w/ family and call if/when ready to complete)   Advance Directives Documents given;Documents explained

## 2019-03-22 NOTE — ED NOTES
Bed: 17  Expected date:   Expected time:   Means of arrival: [facility]  Comments:  [facility]  03/22/19 1105

## 2019-03-22 NOTE — FLOWSHEET NOTE
Admission assessment complete. VSS. Pt. Is alert and oriented resting comfortably in bed. Pt. Has no complaints of pain at this time. Pt. Is SOB with exertion and at rest. POC discussed with pt. And pt. States understanding and is in agreement with plan. Call light and bedside table within reach. 03/22/19 1445   Vital Signs   Temp 97.6 °F (36.4 °C)   Temp Source Temporal   Pulse 76   Heart Rate Source Brachial   Resp 20   /60   BP Location Right upper arm   MAP (mmHg) 85   Patient Position Semi fowlers   Level of Consciousness 0   MEWS Score 1   Patient Currently in Pain Denies   Height and Weight   Height 5' 5\" (1.651 m)   Weight 119 lb (54 kg)   Weight Method Actual;Standing scale   BSA (Calculated - sq m) 1.57 sq meters   BMI (Calculated) 19.8   Pain Assessment   Pain Assessment 0-10   Pain Level 0   Non-Pharmaceutical Pain Intervention(s) Declines   Response to Pain Intervention Patient Satisfied   Oxygen Therapy   SpO2 94 %   Pulse Oximeter Device Mode Intermittent   Pulse Oximeter Device Location Finger   O2 Device None (Room air)   Will continue to monitor.  Steff Rodrigues

## 2019-03-22 NOTE — ED PROVIDER NOTES
[address] [facility]  eMERGENCY dEPARTMENT eNCOUnter        Pt Name: Nikki Vail  MRN: [id number]  [place] 1945  Date of evaluation: 3/22/2019  Provider: DAMEON Gary  PCP: Maicol Bailey MD    This patient was seen and evaluated by the attending physician Doug Cano, [address] [facility]       Chief Complaint   Patient presents with    Shortness of Breath     reports worsening shortness of breath over last few days. Denies any increased swelling but does report productive cough. Received duoneb in route that \"helped. \"  Sats 86% on room air with c/o headache. HISTORY OF PRESENT ILLNESS   (Location/Symptom, Timing/Onset, Context/Setting, Quality, Duration, Modifying Factors, Severity)  Note limiting factors. Nikki Vail is a 66 y.o. female with past medical history of asthma, bipolar disorder, previous bladder cancer, COPD, depression, diabetes, hypertension, obesity and tobacco use disorder who presents ED plan of shortness of breath. States worsening shortness of breath over the past 3 days. States shortness of breath worsened with exertion. Patient denies any pain. Denies any fever or chills. States does have productive cough. Denies sick contacts or recent travel. Became concerned and came to the ED for further evaluation and treatment. Patient denies pleuritic pain, orthopnea, pedal edema, calf tenderness, fever/chills, rashes/lesions, abdominal pain, nausea/vomiting, urinary symptoms or changes in bowel movements. Nursing Notes were all reviewed and agreed with or any disagreements were addressed  in the HPI. REVIEW OF SYSTEMS    (2-9 systems for level 4, 10 or more for level 5)     Review of Systems   Constitutional: Negative for activity change, appetite change, chills, diaphoresis, fatigue and fever. Respiratory: Positive for cough, chest tightness, shortness of breath and wheezing.  Negative for apnea, choking and stridor. Cardiovascular: Negative. Negative for chest pain, palpitations and leg swelling. Gastrointestinal: Negative for abdominal pain, constipation, diarrhea, nausea and vomiting. Genitourinary: Negative for difficulty urinating and dysuria. Musculoskeletal: Negative for arthralgias, myalgias, neck pain and neck stiffness. Skin: Negative for color change, pallor, rash and wound. Neurological: Negative for dizziness, light-headedness and headaches. Positives and Pertinent negatives as per HPI. Except as noted abovein the ROS, all other systems were reviewed and negative.        PAST MEDICAL HISTORY     Past Medical History:   Diagnosis Date    Arthritis     Asthma     Bipolar 1 disorder ([facility] [address]     Cancer ([facility] [address]     bladder    COPD (chronic obstructive pulmonary disease) ([facility] [address]     Depression     controlled w/meds    Diabetes mellitus ([facility] [address]     diet control    Hypertension     Neuropathy     toes    Obesity, unspecified     Osteoarthrosis, unspecified whether generalized or localized, lower leg     PVD (peripheral vascular disease) ([facility] [address]     Tobacco use disorder     Unspecified cataract          SURGICAL HISTORY     Past Surgical History:   Procedure Laterality Date    ABDOMEN SURGERY      BLADDER REMOVAL      partial    CHOLECYSTECTOMY      COLONOSCOPY      CYSTOSCOPY  4-7-10    fulgeration of bladder tumor    CYSTOSCOPY  7/14/10    Cytology    CYSTOSCOPY  12/15/10    CYSTOSCOPY  3/2/11    COLLAGEN INJECTION    CYSTOSCOPY  6-    cytology    CYSTOSCOPY  10/19/11    CYSTOSCOPY  4/11/12    CYSTOSCOPY  10/8/12    cytology    CYSTOSCOPY  4/7/14    cysto/cytology    CYSTOSCOPY  04/29/2015    Cytology study    EYE SURGERY      cataract left eye    FEMUR FRACTURE SURGERY Left [id number]    GASTRIC BYPASS SURGERY      JOINT REPLACEMENT      left knee    OTHER SURGICAL HISTORY Left 10/24/2016    left nephroureterectomy    OTHER SURGICAL HISTORY  03/22/2017 file   Lifestyle    Physical activity:     Days per week: Not on file     Minutes per session: Not on file    Stress: Not on file   Relationships    Social connections:     Talks on phone: Not on file     Gets together: Not on file     Attends Sikh service: Not on file     Active member of club or organization: Not on file     Attends meetings of clubs or organizations: Not on file     Relationship status: Not on file    Intimate partner violence:     Fear of current or ex partner: Not on file     Emotionally abused: Not on file     Physically abused: Not on file     Forced sexual activity: Not on file   Other Topics Concern    Not on file   Social History Narrative    Not on file       SCREENINGS             PHYSICAL EXAM    (up to 7 for level 4, 8 or more for level 5)     ED Triage Vitals [03/22/19 1120]   BP Temp Temp src Pulse Resp SpO2 Height Weight   (!) 147/61 97.4 °F (36.3 °C) -- 74 26 (!) 86 % 5' 5\" (1.651 m) 125 lb (56.7 kg)       Physical Exam   Constitutional: She is oriented to person, place, and time. She appears well-developed and well-nourished. No distress. HENT:   Head: Normocephalic and atraumatic. Right Ear: External ear normal.   Left Ear: External ear normal.   Mouth/Throat: Oropharynx is clear and moist. No oropharyngeal exudate. Eyes: Conjunctivae are normal. Right eye exhibits no discharge. Left eye exhibits no discharge. Neck: Normal range of motion. Neck supple. No JVD present. Cardiovascular: Normal rate, regular rhythm, normal heart sounds and intact distal pulses. Exam reveals no gallop and no friction rub. No murmur heard. 2+ radial pulses bilaterally. No pedal edema. No calf tenderness. No JVD. Pulmonary/Chest: Effort normal. No stridor. No respiratory distress. She has wheezes. She has no rales. She exhibits no tenderness. Abdominal: Soft. Bowel sounds are normal. She exhibits no distension and no mass. There is no tenderness.  There is no rigidity, no rebound, no guarding and no CVA tenderness. Musculoskeletal: Normal range of motion. Lymphadenopathy:     She has no cervical adenopathy. Neurological: She is alert and oriented to person, place, and time. Skin: Skin is warm and dry. No rash noted. She is not diaphoretic. No erythema. No pallor. Psychiatric: She has a normal mood and affect.  Her behavior is normal.       DIAGNOSTIC RESULTS   LABS:    Labs Reviewed   COMPREHENSIVE METABOLIC PANEL W/ REFLEX TO MG FOR LOW K - Abnormal; Notable for the following components:       Result Value    Glucose 114 (*)     CREATININE 1.4 (*)     GFR Non- 37 (*)     GFR African American 44 (*)     Albumin/Globulin Ratio 0.8 (*)     Alkaline Phosphatase 177 (*)     All other components within normal limits    Narrative:     Performed at:  [facility] [address]   Phone 21  - Abnormal; Notable for the following components:    Pro-BNP 1,887 (*)     All other components within normal limits    Narrative:     Performed at:  [facility] [address]   Phone [phone]   [place] A/B ANTIGENS    Narrative:     Performed at:  [facility] [address]   Phone [phone]   CULTURE BLOOD #2   CULTURE BLOOD #1   CBC WITH AUTO DIFFERENTIAL    Narrative:     Performed at:  [facility] [address]   Phone [phone]   LACTATE, SEPSIS    Narrative:     Performed at:  [facility] [address]   Phone [phone]   TROPONIN    Narrative:     Performed at:  [facility] [address]   Phone [phone]   PROTIME-INR    Narrative:     Performed at:  [facility]) - [facility]  [address],  [facility], [address]   Phone [phone]   LACTATE, SEPSIS       All other labs were within normal range or not returned as of this dictation. EKG: All EKG's are interpreted by the Emergency Department Physician who either signs orCo-signs this chart in the absence of a cardiologist.  Please see their note for interpretation of EKG. RADIOLOGY:   Non-plain film images such as CT, Ultrasound and MRI are read by the radiologist. Plain radiographic images are visualized andpreliminarily interpreted by the  ED Provider with the below findings:        Interpretation [facility] Radiologist below, if available at the time of this note:    XR CHEST PORTABLE   Final Result   No evidence of pneumonia           No results found. PROCEDURES   Unless otherwise noted below, none     Procedures    CRITICAL CARE TIME   N/A    CONSULTS:  IP CONSULT TO HOSPITALIST      EMERGENCY DEPARTMENT COURSE and DIFFERENTIALDIAGNOSIS/MDM:   Vitals:    Vitals:    03/22/19 1120   BP: (!) 147/61   Pulse: 74   Resp: 26   Temp: 97.4 °F (36.3 °C)   SpO2: (!) 86%   Weight: 125 lb (56.7 kg)   Height: 5' 5\" (1.651 m)       Patient was given thefollowing medications:  Medications   cefTRIAXone (ROCEPHIN) 1 g in sterile water 10 mL IV syringe (has no administration in time range)   azithromycin (ZITHROMAX) injection 500 mg (has no administration in time range)   methylPREDNISolone sodium (SOLU-MEDROL) injection 125 mg (125 mg Intravenous Given 3/22/19 1234)   ipratropium-albuterol (DUONEB) nebulizer solution 1 ampule (1 ampule Inhalation Given 3/22/19 1213)   albuterol (PROVENTIL) nebulizer solution 5 mg (5 mg Nebulization Given 3/22/19 1213)       Patient is a 70-year-old female who presents the ED with complaint of shortness of breath. Upon arrival noted to be hypoxic at 86% on room air. Patient does not wear oxygen at home.   Patient placed on 3 L nasal cannula oxygen with improvement of hypoxia. Radial vital signs unremarkable. Lungs show rhonchorous breath sounds bilaterally with associated wheezing. Given Solu-Medrol and breathing treatments here in the ED. IV established and blood work obtained. CBC showed normal white count, hemoglobin and platelets. CMP showed a creatinine of 1.4. About baseline. GFR 37. Remaining CMP unremarkable. Lactic acid normal.  Troponin normal.  BNP 1800 without evidence of fluid overload upon physical exam.  INR normal.  Flu swab negative. Chest x-ray unremarkable. EKG interpreted by attending. Given history and physical examination suffering from acute respiratory failure with associated hypoxia most likely secondary to COPD exacerbation at this time. Patient requiring supplemental oxygen and will require admission for further evaluation and treatment. Patient given Solu-Medrol and breathing treatments here in the ED. We'll give first dose antibiotics with Rocephin and azithromycin here in the ED given underlying lung disease with cough and chest congestion to rule out potential underlying pneumonia not initially seen on x-ray here in the emergency department. Will require admission. Case discussed with hospitalist, Dr. Magdalene Liriano, who agreed to admit patient for further evaluation and treatment. FINAL IMPRESSION      1. COPD with acute exacerbation ([facility] [address]    2.  Acute respiratory failure with hypoxia [facility])          DISPOSITION/PLAN   DISPOSITION Admitted 03/22/2019 01:06:15 PM      PATIENT REFERREDTO:  Jomar Juarez MD  [address]  [facility] [address] [facility]            DISCHARGE MEDICATIONS:  New Prescriptions    No medications on file       DISCONTINUED MEDICATIONS:  Discontinued Medications    No medications on file              (Please note that portions ofthis note were completed with a voice recognition program.  Efforts were made to edit the dictations but occasionally words are mis-transcribed.)    DAMEON Sousa (electronically signed)          DAMEON Briggs  03/22/19 5923

## 2019-03-22 NOTE — H&P
[facility]    3/22/2019 1:06 PM    Patient Information:  Sadie Calixto is a 66 y.o. female [id number]  PCP:  Erna Araujo MD (Tel: [phone] )    Chief complaint:    Chief Complaint   Patient presents with    Shortness of Breath     reports worsening shortness of breath over last few days. Denies any increased swelling but does report productive cough. Received duoneb in route that \"helped. \"  Sats 86% on room air with c/o headache. History of Present Illness:     Rosalio Neumann is a 66 y.o. female with known COPD who presents with wosening sob. Onset about few days ago that has progressively got worse to a point where pt gets dyspneinc. Pt tried home inhaler but was not helping. Note some worsening cough. No fevers. . Current symptoms include: acute dyspnea, non-productive cough and wheezing. The course of his symptoms over time is acute is worsening . No n/v/d/ no sick contact that pt can think of. In ER pt was gvsofia kilgoreonebs, iv steroid and put on oxygen which helped. Pt states noted some improvement in symptoms. symptoms improved with rest and some inhaler and worsened with weather changes, temperature. Exertion. Denies any chest pain. REVIEW OF SYSTEMS:   Constitutional: Negative for fever,chills or night sweats  ENT: Negative for rhinorrhea, epistaxis, hoarseness, sore throat. Respiratory: positive for  for shortness of breath,wheezing  Cardiovascular: Negative for chest pain, palpitations   Gastrointestinal: Negative for nausea, vomiting, diarrhea  Genitourinary: Negative for polyuria, dysuria   Hematologic/Lymphatic: Negative for bleeding tendency, easy bruising  Musculoskeletal: Negative for myalgias and arthralgias  Neurologic: Negative for confusion,dysarthria. Skin: Negative for itching,rash  Psychiatric: Negative for depression,anxiety, agitation.   Endocrine: Negative for polydipsia,polyuria,heat /cold intolerance. Past Medical History:   has a past medical history of Arthritis, Asthma, Bipolar 1 disorder ([facility] Cancer ([facility] COPD (chronic obstructive pulmonary disease) ([facility] [facility] Depression, [facility] Hypertension, Neuropathy, Obesity, unspecified, Osteoarthrosis, unspecified whether generalized or localized, lower leg, PVD (peripheral vascular disease) ([facility] Tobacco use disorder, and Unspecified cataract. Past Surgical History:   has a past surgical history that includes Cholecystectomy; Gastric bypass surgery; Bladder removal; joint replacement; eye surgery; Cystoscopy (4-7-10); Cystoscopy (7/14/10); Cystoscopy (12/15/10); Cystoscopy (3/2/11); Cystoscopy (6-); Cystoscopy (10/19/11); Cystoscopy (4/11/12); Cystocopy (10/8/12); Cystocopy (4/7/14); Total knee arthroplasty (Left); Femur fracture surgery (Left, [id number]); Cystoscopy (04/29/2015); Colonoscopy; Abdomen surgery; other surgical history (Left, 10/24/2016); and other surgical history (03/22/2017). Medications:       Current Outpatient Medications on File Prior to Encounter   Medication Sig Dispense Refill    Cyanocobalamin (VITAMIN B-12) 1000 MCG extended release tablet Take 1 tablet by mouth daily 90 tablet 3    venlafaxine (EFFEXOR) 75 MG tablet Take 1 tablet by mouth 2 times daily 60 tablet 5    metoprolol succinate (TOPROL XL) 25 MG extended release tablet TAKE ONE TABLET BY MOUTH ONCE DAILY 90 tablet 3    Omega-3 Fatty Acids (FISH OIL PO) Take by mouth      acetaminophen (TYLENOL) 500 MG tablet Take 500 mg by mouth every 6 hours as needed for Pain.  Multiple Vitamin (MULTI-VITAMIN PO) Take  by mouth daily.  albuterol (PROVENTIL) (2.5 MG/3ML) 0.083% nebulizer solution Take 2.5 mg by nebulization every 6 hours as needed. Allergies:   Allergies   Allergen Reactions    Aspirin Shortness Of Breath    Ibuprofen Shortness Of Breath    Adhesive Tape Swelling Only if it stays on a long time    Hydrochlorothiazide Other (See Comments)     Palpitations.  Microderm Base [Albolene]     Nsaids Other (See Comments)     Respiratory failure.  Voltaren [Diclofenac Sodium]      Wheezing, swelling.  Procardia [Nifedipine] Swelling and Rash        Social History:   reports that she has been smoking cigarettes. She has a 27.50 pack-year smoking history. She has never used smokeless tobacco. She reports that she drinks alcohol. She reports that she does not use drugs. Family History:  family history includes Anesth Problems in her mother; Heart Disease in her brother, father, and mother; Stroke in her brother. ,     Physical Exam:  BP (!) 147/61   Pulse 74   Temp 97.4 °F (36.3 °C)   Resp 26   Ht 5' 5\" (1.651 m)   Wt 125 lb (56.7 kg)   SpO2 (!) 86% Comment: increased to 96 on 4 liters. BMI 20.80 kg/m²     General appearance:  Appears comfortable. Well nourished  Eyes: Sclera clear, pupils equal  ENT: Moist mucus membranes, no thrush. Trachea midline. Cardiovascular: Regular rhythm, normal S1, S2. No murmur, gallop, rub. No edema in lower extremities  Respiratory: diffuse wheezing with mild distress. No acessory muscles note  Gastrointestinal: Abdomen soft, non-tender, not distended, normal bowel sounds  Musculoskeletal: No cyanosis in digits, neck supple  Neurology: Cranial nerves grossly intact. Alert and oriented in time, place and person. No speech or motor deficits  Psychiatry: Appropriate affect.  Not agitated  Skin: Warm, dry, normal turgor, no rash    Labs:  CBC:   Lab Results   Component Value Date    WBC 6.8 03/22/2019    RBC 4.04 03/22/2019    HGB 12.6 03/22/2019    HCT 39.1 03/22/2019    MCV 96.8 03/22/2019    MCH 31.3 03/22/2019    MCHC 32.3 03/22/2019    RDW 13.0 03/22/2019     03/22/2019    MPV 10.4 03/22/2019     BMP:    Lab Results   Component Value Date     03/22/2019    K 4.0 03/22/2019     03/22/2019    CO2 24 03/22/2019    BUN 18 03/22/2019    CREATININE 1.4 03/22/2019    CALCIUM 8.7 03/22/2019    GFRAA 44 03/22/2019    GFRAA 57 10/08/2012    LABGLOM 37 03/22/2019    GLUCOSE 114 03/22/2019       Chest Xray:   EKG:    I visualized CXR images - without any evidence of focal consolidation. Problem List  Active Problems:    COPD exacerbation ([facility]  Resolved Problems:    * No resolved hospital problems. *        Assessment/Plan:   1. Acute exacerbation of COPD  - continue IV steroids,  - duonebs scheduled. - baseline oxygen  - home inhalers. - oxygen therapy as required wean as tolerated  - incentive spirometry. - azithromycin. Acute respiratory failure with hypoxia. - hypoxic in er  - wean oxygen to room air.      SANAM  IVf  - repeat labs in am   Sadi Mao MD    3/22/2019 1:06 PM

## 2019-03-22 NOTE — ED PROVIDER NOTES
I personally evaluated and examined the patient in conjunction with the APC and agree with the assessment, treatment plan and disposition of the patient has recorded by the APC. I reviewed pertinent nurse's notes, triage notes, vital signs, past medical history, family and social history, medications, and allergies. Complete review of systems was conducted by the mid-level provider and/or myself. Review of systems is negative except as documented in the history of present illness. EKG: EKG interpretation by ED physician: Normal axis, normal sinus rhythm at a rate of 74 bpm. No ischemic ST changes. 66year old female presents to the emergency department with chief complaint of shortness of breath, productive cough and wheezing. No fever. She denies history of COPD however this is less than in her electronic chart. Patient hypoxic 86% on room air. General: Patient is in no acute distress   Head: Normocephalic, atraumatic, pupils are equal and reactive to light. EOMI. Neck: Neck is supple. No JVD noted. Heart: RRR no murmurs, rubs, or gallops   Lungs: BL wheezes. No resp distress. Abdomen: soft, non-tender, non-distended   Extremities: no lower extremity edema. Capillary refill is less than 2 seconds   Skin: no cyanosis or pallor; no rashes noted   Neuro: CN's 2-12 are grossly intact. No focal neurologic deficit appreciated. Cardiac vergara initiated as well as steroids and breathing tx's. abx ordered pt to be brought in for hospitalization. CRITICAL CARE TIME: 30 minutes excluding billable procedure time: Treatment of hypoxia with oxygenation, steroids, breathing treatments, complex MDM, multiple re-evaluations, potential for deterioration without treatment. FINAL IMPRESSION     1. COPD with acute exacerbation ([address]    2. Acute respiratory failure with hypoxia [facility])            Electronically signed by:   Miguelina Dangelo, DO  03/22/19 5620

## 2019-03-22 NOTE — ED NOTES
Pt requesting food, admission orders checked and tray order faxed to dietary.       Jules Sheikh RN  03/22/19 9547

## 2019-03-23 LAB
ANION GAP SERPL CALCULATED.3IONS-SCNC: 10 MMOL/L (ref 3–16)
BUN BLDV-MCNC: 28 MG/DL (ref 7–20)
CALCIUM SERPL-MCNC: 8.5 MG/DL (ref 8.3–10.6)
CHLORIDE BLD-SCNC: 106 MMOL/L (ref 99–110)
CO2: 21 MMOL/L (ref 21–32)
CREAT SERPL-MCNC: 1.5 MG/DL (ref 0.6–1.2)
GFR AFRICAN AMERICAN: 41
GFR NON-AFRICAN AMERICAN: 34
GLUCOSE BLD-MCNC: 110 MG/DL (ref 70–99)
POTASSIUM SERPL-SCNC: 4.1 MMOL/L (ref 3.5–5.1)
SODIUM BLD-SCNC: 137 MMOL/L (ref 136–145)

## 2019-03-23 PROCEDURE — 2580000003 HC RX 258: Performed by: HOSPITALIST

## 2019-03-23 PROCEDURE — 94668 MNPJ CHEST WALL SBSQ: CPT

## 2019-03-23 PROCEDURE — 80048 BASIC METABOLIC PNL TOTAL CA: CPT

## 2019-03-23 PROCEDURE — 6370000000 HC RX 637 (ALT 250 FOR IP): Performed by: NURSE PRACTITIONER

## 2019-03-23 PROCEDURE — 6360000002 HC RX W HCPCS: Performed by: HOSPITALIST

## 2019-03-23 PROCEDURE — 94667 MNPJ CHEST WALL 1ST: CPT

## 2019-03-23 PROCEDURE — 36415 COLL VENOUS BLD VENIPUNCTURE: CPT

## 2019-03-23 PROCEDURE — 6370000000 HC RX 637 (ALT 250 FOR IP): Performed by: HOSPITALIST

## 2019-03-23 PROCEDURE — G0008 ADMIN INFLUENZA VIRUS VAC: HCPCS | Performed by: HOSPITALIST

## 2019-03-23 PROCEDURE — 90686 IIV4 VACC NO PRSV 0.5 ML IM: CPT | Performed by: HOSPITALIST

## 2019-03-23 PROCEDURE — 94640 AIRWAY INHALATION TREATMENT: CPT

## 2019-03-23 PROCEDURE — 94760 N-INVAS EAR/PLS OXIMETRY 1: CPT

## 2019-03-23 PROCEDURE — 1200000000 HC SEMI PRIVATE

## 2019-03-23 RX ORDER — OLOPATADINE HYDROCHLORIDE 1 MG/ML
1 SOLUTION/ DROPS OPHTHALMIC 2 TIMES DAILY
Status: DISCONTINUED | OUTPATIENT
Start: 2019-03-23 | End: 2019-03-24 | Stop reason: HOSPADM

## 2019-03-23 RX ORDER — NICOTINE 21 MG/24HR
1 PATCH, TRANSDERMAL 24 HOURS TRANSDERMAL DAILY
Status: DISCONTINUED | OUTPATIENT
Start: 2019-03-23 | End: 2019-03-24 | Stop reason: HOSPADM

## 2019-03-23 RX ORDER — OLOPATADINE HYDROCHLORIDE 1 MG/ML
1 SOLUTION/ DROPS OPHTHALMIC 2 TIMES DAILY
Status: ON HOLD | COMMUNITY
End: 2019-03-24 | Stop reason: HOSPADM

## 2019-03-23 RX ADMIN — VENLAFAXINE 75 MG: 37.5 TABLET ORAL at 09:16

## 2019-03-23 RX ADMIN — AZITHROMYCIN 500 MG: 250 TABLET, FILM COATED ORAL at 09:15

## 2019-03-23 RX ADMIN — INFLUENZA A VIRUS A/MICHIGAN/45/2015 X-275 (H1N1) ANTIGEN (FORMALDEHYDE INACTIVATED), INFLUENZA A VIRUS A/SINGAPORE/INFIMH-16-0019/2016 IVR-186 (H3N2) ANTIGEN (FORMALDEHYDE INACTIVATED), INFLUENZA B VIRUS B/PHUKET/3073/2013 ANTIGEN (FORMALDEHYDE INACTIVATED), AND INFLUENZA B VIRUS B/MARYLAND/15/2016 BX-69A ANTIGEN (FORMALDEHYDE INACTIVATED) 0.5 ML: 15; 15; 15; 15 INJECTION, SUSPENSION INTRAMUSCULAR at 09:16

## 2019-03-23 RX ADMIN — OLOPATADINE HYDROCHLORIDE 1 DROP: 1 SOLUTION/ DROPS OPHTHALMIC at 20:39

## 2019-03-23 RX ADMIN — Medication 10 ML: at 20:38

## 2019-03-23 RX ADMIN — IPRATROPIUM BROMIDE AND ALBUTEROL SULFATE 1 AMPULE: .5; 3 SOLUTION RESPIRATORY (INHALATION) at 07:58

## 2019-03-23 RX ADMIN — ENOXAPARIN SODIUM 30 MG: 30 INJECTION SUBCUTANEOUS at 20:37

## 2019-03-23 RX ADMIN — VENLAFAXINE 75 MG: 37.5 TABLET ORAL at 20:37

## 2019-03-23 RX ADMIN — Medication 1000 MCG: at 09:14

## 2019-03-23 RX ADMIN — METHYLPREDNISOLONE SODIUM SUCCINATE 40 MG: 40 INJECTION, POWDER, FOR SOLUTION INTRAMUSCULAR; INTRAVENOUS at 09:16

## 2019-03-23 RX ADMIN — ACETAMINOPHEN 500 MG: 500 TABLET, FILM COATED ORAL at 01:02

## 2019-03-23 RX ADMIN — OLOPATADINE HYDROCHLORIDE 1 DROP: 1 SOLUTION/ DROPS OPHTHALMIC at 11:49

## 2019-03-23 RX ADMIN — ACETAMINOPHEN 500 MG: 500 TABLET, FILM COATED ORAL at 11:57

## 2019-03-23 RX ADMIN — Medication 10 ML: at 09:22

## 2019-03-23 RX ADMIN — METOPROLOL SUCCINATE 25 MG: 25 TABLET, FILM COATED, EXTENDED RELEASE ORAL at 09:14

## 2019-03-23 RX ADMIN — SODIUM CHLORIDE: 9 INJECTION, SOLUTION INTRAVENOUS at 02:43

## 2019-03-23 RX ADMIN — IPRATROPIUM BROMIDE AND ALBUTEROL SULFATE 1 AMPULE: .5; 3 SOLUTION RESPIRATORY (INHALATION) at 16:13

## 2019-03-23 RX ADMIN — IPRATROPIUM BROMIDE AND ALBUTEROL SULFATE 1 AMPULE: .5; 3 SOLUTION RESPIRATORY (INHALATION) at 21:33

## 2019-03-23 RX ADMIN — IPRATROPIUM BROMIDE AND ALBUTEROL SULFATE 1 AMPULE: .5; 3 SOLUTION RESPIRATORY (INHALATION) at 12:56

## 2019-03-23 ASSESSMENT — PAIN DESCRIPTION - LOCATION
LOCATION: HEAD
LOCATION: HEAD

## 2019-03-23 ASSESSMENT — PAIN DESCRIPTION - PAIN TYPE
TYPE: ACUTE PAIN
TYPE: ACUTE PAIN

## 2019-03-23 ASSESSMENT — PAIN SCALES - GENERAL
PAINLEVEL_OUTOF10: 6
PAINLEVEL_OUTOF10: 2
PAINLEVEL_OUTOF10: 5

## 2019-03-23 ASSESSMENT — PAIN DESCRIPTION - DESCRIPTORS
DESCRIPTORS: HEADACHE
DESCRIPTORS: ACHING

## 2019-03-23 NOTE — PROGRESS NOTES
Shift assessment completed, patient with one episode of incontinence in the bed. Assisted with getting cleaned up and back to bed. Med passed per [facility]. IV fluids infusing.

## 2019-03-23 NOTE — PROGRESS NOTES
Physical Findings: moderate muscle loss  · Current Nutrition Therapies:  · Oral Diet Orders: General   · Oral Diet intake: (variable)  · Oral Nutrition Supplement (ONS) Orders: None  · Anthropometric Measures:  · Ht: 5' 5\" (165.1 cm)   · Current Body Wt: 120 lb (54.4 kg)  · Admission Body Wt:    · Usual Body Wt: 125 lb (56.7 kg)  · % Weight Change:  ,  unable to validate amount or time frame.   Using only wt available in documented past hx- pt has lost 9 lb in 6 months (7% insignificant)  · BMI Classification: BMI 18.5 - 24.9 Normal Weight    Nutrition Interventions:   Start ONS, Continue current diet  Continued Inpatient Monitoring, Education Not Indicated    Nutrition Evaluation:   · Evaluation: Goals set   · Goals: po intake 50% or greater    · Monitoring: Meal Intake, Supplement Intake, Weight      Electronically signed by Tommie Vargas RD, LD on 3/23/19 at 12:54 PM  Contact Number: [phone]

## 2019-03-23 NOTE — PROGRESS NOTES
Patient complaining of dry eyes and states that she uses over the counter eye drops at home, MD notified via perfect serve.

## 2019-03-23 NOTE — PROGRESS NOTES
[facility] PROGRESS NOTE    3/23/2019 8:22 AM        Name: Radha Javier . Admitted: 3/22/2019  Primary Care Provider: Candido Ganser, MD (Tel: [phone])      Subjective:  . Seen this am while sitting on edge of bed.   States she was unaware that she had COPD    States she needs to quit smoking  Has loose cough productive of clear to white secretions     Reviewed interval ancillary notes    Current Medications    acetaminophen (TYLENOL) tablet 500 mg Q6H PRN   vitamin B-12 (CYANOCOBALAMIN) tablet 1,000 mcg Daily   metoprolol succinate (TOPROL XL) extended release tablet 25 mg Daily   venlafaxine (EFFEXOR) tablet 75 mg BID   sodium chloride flush 0.9 % injection 10 mL 2 times per day   sodium chloride flush 0.9 % injection 10 mL PRN   magnesium hydroxide (MILK OF MAGNESIA) 400 MG/5ML suspension 30 mL Daily PRN   ondansetron (ZOFRAN) injection 4 mg Q6H PRN   enoxaparin (LOVENOX) injection 30 mg Nightly   ipratropium-albuterol (DUONEB) nebulizer solution 1 ampule Q4H WA   azithromycin (ZITHROMAX) tablet 500 mg Daily   Followed by    Haroldine Najjar ON 3/24/2019] azithromycin (ZITHROMAX) tablet 250 mg Daily   methylPREDNISolone sodium (SOLU-MEDROL) injection 40 mg Daily   0.9 % sodium chloride infusion Continuous   influenza quadrivalent split vaccine (FLUZONE;FLUARIX;FLULAVAL;AFLURIA) injection 0.5 mL Once       Objective:  BP (!) 162/65   Pulse 67   Temp 98.1 °F (36.7 °C) (Temporal)   Resp 18   Ht 5' 5\" (1.651 m)   Wt 120 lb (54.4 kg)   SpO2 94%   BMI 19.97 kg/m²     Intake/Output Summary (Last 24 hours) at 3/23/2019 0117  Last data filed at 3/22/2019 2147  Gross per 24 hour   Intake 480 ml   Output --   Net 480 ml    Wt Readings from Last 3 Encounters:   03/23/19 120 lb (54.4 kg)   09/11/18 129 lb 9.6 oz (58.8 kg)   03/06/18 123 lb (55.8 kg)       General appearance:  Appears comfortable but looks frail and chronically ill   Eyes: Sclera clear. Pupils equal.  ENT: Moist oral mucosa. Trachea midline, no adenopathy. Cardiovascular: Regular rhythm, normal S1, S2. No murmur. No edema in lower extremities  Respiratory: Not using accessory muscles. Decreased in bases, rare wheezing noted   GI: Abdomen soft, no tenderness, not distended, normal bowel sounds  Musculoskeletal: No cyanosis in digits, neck supple  Neurology: CN 2-12 grossly intact. No speech or motor deficits  Psych: Normal affect. Alert and oriented in time, place and person  Skin: Warm, dry, normal turgor    Labs and Tests:  CBC:   Recent Labs     03/22/19  1203   WBC 6.8   HGB 12.6        BMP:  Recent Labs     03/22/19  1203 03/23/19  0630    137   K 4.0 4.1    106   CO2 24 21   BUN 18 28*   CREATININE 1.4* 1.5*   GLUCOSE 114* 110*     Hepatic: Recent Labs     03/22/19  1203   AST 23   ALT 12   BILITOT <0.2   ALKPHOS 177*     Chest xray:    No evidence of pneumonia      Problem List  Active Problems:    COPD exacerbation (HCC)  Resolved Problems:    * No resolved hospital problems. *       Assessment & Plan:   1. Acute exacerbation of COPD:  Continue with IV solumedrol, nebs and supportive care   2. Acute hypoxic respiratory failure due to COPD exacerbation. She has been weaned from oxygen   3. SANAM:  Continue with IV hydration for now.  Will recheck labs in am .  She is not on any nephro toxic agents       Diet: DIET GENERAL;  Code:Full Code  DVT PPX      Chris Enriquez, APRN - CNP   3/23/2019 8:22 AM

## 2019-03-23 NOTE — PROGRESS NOTES
Patient states that she has a headache and that \"I need a cigarette\". No nicotine patch ordered. MD notified via perfect serve.

## 2019-03-23 NOTE — PLAN OF CARE
Problem: Oxygenation/Respiratory Function  Goal: Lung sounds clear or within normal limits for patient  Note:   Pt A&OX4, /67   Pulse 70   Temp 98.4 °F (36.9 °C)   Resp 16   Ht 5' 5\" (1.651 m)   Wt 120 lb (54.4 kg)   SpO2 94%   BMI 19.97 kg/m²   Lungs diminished with occasional exp wheezes, on RA, no c/o pain, pt able to ambulate well independently, nicotine patch applied.

## 2019-03-23 NOTE — PLAN OF CARE
Nutrition Problem: Inadequate oral intake  Intervention: Food and/or Nutrient Delivery: Modify current diet, Start ONS  Nutritional Goals: po intake 50% or greater

## 2019-03-24 VITALS
OXYGEN SATURATION: 95 % | DIASTOLIC BLOOD PRESSURE: 70 MMHG | HEART RATE: 73 BPM | WEIGHT: 121.7 LBS | SYSTOLIC BLOOD PRESSURE: 136 MMHG | BODY MASS INDEX: 20.28 KG/M2 | RESPIRATION RATE: 18 BRPM | HEIGHT: 65 IN | TEMPERATURE: 97.8 F

## 2019-03-24 LAB
ANION GAP SERPL CALCULATED.3IONS-SCNC: 10 MMOL/L (ref 3–16)
BILIRUBIN URINE: NEGATIVE
BLOOD, URINE: ABNORMAL
BUN BLDV-MCNC: 34 MG/DL (ref 7–20)
CALCIUM SERPL-MCNC: 8.4 MG/DL (ref 8.3–10.6)
CHLORIDE BLD-SCNC: 108 MMOL/L (ref 99–110)
CLARITY: ABNORMAL
CO2: 23 MMOL/L (ref 21–32)
COLOR: YELLOW
CREAT SERPL-MCNC: 1.6 MG/DL (ref 0.6–1.2)
CREATININE URINE: 39.8 MG/DL (ref 28–259)
EPITHELIAL CELLS, UA: 2 /HPF (ref 0–5)
GFR AFRICAN AMERICAN: 38
GFR NON-AFRICAN AMERICAN: 31
GLUCOSE BLD-MCNC: 83 MG/DL (ref 70–99)
GLUCOSE URINE: NEGATIVE MG/DL
HYALINE CASTS: 4 /LPF (ref 0–8)
KETONES, URINE: NEGATIVE MG/DL
LEUKOCYTE ESTERASE, URINE: ABNORMAL
MICROSCOPIC EXAMINATION: YES
NITRITE, URINE: NEGATIVE
PH UA: 6 (ref 5–8)
POTASSIUM SERPL-SCNC: 4.6 MMOL/L (ref 3.5–5.1)
PROTEIN UA: NEGATIVE MG/DL
RBC UA: 112 /HPF (ref 0–4)
SODIUM BLD-SCNC: 141 MMOL/L (ref 136–145)
SODIUM URINE: 123 MMOL/L
SPECIFIC GRAVITY UA: 1.01 (ref 1–1.03)
URINE TYPE: ABNORMAL
UROBILINOGEN, URINE: 0.2 E.U./DL
WBC UA: 41 /HPF (ref 0–5)

## 2019-03-24 PROCEDURE — 94668 MNPJ CHEST WALL SBSQ: CPT

## 2019-03-24 PROCEDURE — 82570 ASSAY OF URINE CREATININE: CPT

## 2019-03-24 PROCEDURE — 6360000002 HC RX W HCPCS: Performed by: HOSPITALIST

## 2019-03-24 PROCEDURE — 36415 COLL VENOUS BLD VENIPUNCTURE: CPT

## 2019-03-24 PROCEDURE — 94760 N-INVAS EAR/PLS OXIMETRY 1: CPT

## 2019-03-24 PROCEDURE — 94640 AIRWAY INHALATION TREATMENT: CPT

## 2019-03-24 PROCEDURE — 81001 URINALYSIS AUTO W/SCOPE: CPT

## 2019-03-24 PROCEDURE — 6370000000 HC RX 637 (ALT 250 FOR IP): Performed by: HOSPITALIST

## 2019-03-24 PROCEDURE — 2580000003 HC RX 258: Performed by: HOSPITALIST

## 2019-03-24 PROCEDURE — 6370000000 HC RX 637 (ALT 250 FOR IP): Performed by: NURSE PRACTITIONER

## 2019-03-24 PROCEDURE — 80048 BASIC METABOLIC PNL TOTAL CA: CPT

## 2019-03-24 PROCEDURE — 84300 ASSAY OF URINE SODIUM: CPT

## 2019-03-24 RX ORDER — NICOTINE 21 MG/24HR
1 PATCH, TRANSDERMAL 24 HOURS TRANSDERMAL DAILY
Qty: 30 PATCH | Refills: 3 | Status: SHIPPED | OUTPATIENT
Start: 2019-03-24 | End: 2020-06-23 | Stop reason: ALTCHOICE

## 2019-03-24 RX ORDER — PREDNISONE 20 MG/1
20 TABLET ORAL DAILY
Qty: 30 TABLET | Refills: 0 | Status: SHIPPED | OUTPATIENT
Start: 2019-03-24 | End: 2019-03-29

## 2019-03-24 RX ORDER — CYANOCOBALAMIN (VITAMIN B-12) 1000 MCG
1000 TABLET, EXTENDED RELEASE ORAL DAILY
Qty: 90 TABLET | Refills: 3 | Status: SHIPPED | OUTPATIENT
Start: 2019-03-24

## 2019-03-24 RX ORDER — AZITHROMYCIN 250 MG/1
250 TABLET, FILM COATED ORAL DAILY
Qty: 5 TABLET | Refills: 0 | Status: SHIPPED | OUTPATIENT
Start: 2019-03-24 | End: 2019-03-29

## 2019-03-24 RX ADMIN — OLOPATADINE HYDROCHLORIDE 1 DROP: 1 SOLUTION/ DROPS OPHTHALMIC at 09:26

## 2019-03-24 RX ADMIN — AZITHROMYCIN 250 MG: 250 TABLET, FILM COATED ORAL at 09:18

## 2019-03-24 RX ADMIN — Medication 1000 MCG: at 09:18

## 2019-03-24 RX ADMIN — VENLAFAXINE 75 MG: 37.5 TABLET ORAL at 09:18

## 2019-03-24 RX ADMIN — Medication 10 ML: at 09:19

## 2019-03-24 RX ADMIN — SODIUM CHLORIDE: 9 INJECTION, SOLUTION INTRAVENOUS at 03:33

## 2019-03-24 RX ADMIN — METHYLPREDNISOLONE SODIUM SUCCINATE 40 MG: 40 INJECTION, POWDER, FOR SOLUTION INTRAMUSCULAR; INTRAVENOUS at 09:18

## 2019-03-24 RX ADMIN — METOPROLOL SUCCINATE 25 MG: 25 TABLET, FILM COATED, EXTENDED RELEASE ORAL at 09:18

## 2019-03-24 RX ADMIN — IPRATROPIUM BROMIDE AND ALBUTEROL SULFATE 1 AMPULE: .5; 3 SOLUTION RESPIRATORY (INHALATION) at 08:49

## 2019-03-24 NOTE — CONSULTS
[address]                     [facility] [address]                                  CONSULTATION    PATIENT NAME: Seema Warren                  :        1945  MED REC NO:   [id number]                          ROOM:       2009  ACCOUNT NO:   [de-identified]                           ADMIT DATE: 2019  PROVIDER:     Cordell Bsuh MD    CONSULT DATE:  2019    CONSULTING PHYSICIAN:  Cordell Bush MD    REFERRING PROVIDER/NURSE PRACTITIONER:  Eric Yanez NP    REASON FOR CONSULT:  Chronic kidney disease stage III. HISTORY OF PRESENT ILLNESS:  The patient is a 55-year-old female with  history of CKD III, baseline creatinine 1.4 to 1.6, COPD, bipolar  disorder, diabetes mellitus, peripheral vascular disease, prolonged  smoking, who presented to the hospital on 2019 secondary to  shortness of breath. She was subsequently admitted for COPD  exacerbation. I am asked to see the patient because her creatinine is  elevated to 1.6 today. Reviewing previous creatinine levels, her  creatinine was up to 1.8 in 2018. Her lowest creatinine is around  1.4. Her systolic blood pressure recently is ranging from 120 to 160s. Her breathing has significantly improved and she is planning for her  discharge today. PAST MEDICAL HISTORY: Includes CKD III, bipolar disorder, COPD, diabetes  mellitus, peripheral vascular disease, neuropathy, prolonged smoking. ALLERGIES: Includes ASPIRIN, IBUPROFEN, ADHESIVE TAPE, _____ NSAIDS,  PROCARDIA. CURRENT MEDICATIONS:  Include azithromycin, fenofibrate,  ipratropium/albuterol, methylprednisolone, metoprolol XL, nicotine  patch, olopatadine ophthalmic solution, venlafaxine, vitamin B12. SOCIAL HISTORY:  Positive smoking. Denies any drug abuse. Does drink  alcohol occasionally. FAMILY HISTORY:  Includes heart disease and CVA. REVIEW OF SYSTEMS:  GENERAL:  Denies any malaise.   SKIN:  No skin  rashes, itching, or discharge. NEUROLOGIC:  No headaches or focal  deficits. CARDIOVASCULAR:  No chest pain or palpitations. PULMONARY:   Shortness of breath is better. No coughing or hemoptysis. GI:  No  abdominal pain. No nausea. No vomiting or diarrhea, or constipation. RENAL:  Denies any gross hematuria or change in urine output. ENDOCRINE:  History of diabetes. No heat or cold intolerance. ID:  No  fevers or chills. MUSCULOSKELETAL:  Denies acute joint pains. PHYSICAL EXAMINATION:  VITAL SIGNS:  Blood pressure 136/70, pulse 72, respirations 18,  temperature 97.8, saturating 95%, weight listed at 55.2 kg. Urine  output not recorded. GENERAL:  Appears well. HEENT:  Anicteric sclerae. Clear conjunctivae. SKIN:  Anicteric. No rash. CHEST:  Rhonchi bilaterally. HEART:  Regular. ABDOMEN:  Soft, nontender. No rebound or involuntary guarding. EXTREMITIES:  Shows no edema. LABORATORY DATA:  Sodium 141, potassium 4.6, chloride 108, bicarb 23,  BUN 34, creatinine 1.6, glucose 83, calcium 8.4. Troponin less than  0.01, albumin 3.5. WBC 6.8, hemoglobin 12.6, hematocrit 39, platelet  count 166,364. Urinalysis is not available. Rapid influenza A antigen  is negative. Rapid influenza B antigen is negative. Blood cultures are  negative. ASSESSMENT AND PLAN:  1. Chronic kidney disease stage III, baseline creatinine around 1.4 to  1.6. Discussed with the patient avoidance of nephrotoxins. She is  allergic to NSAIDS. Discussed adequate fluid intake. 2.  Electrolytes are acceptable. 3.  Blood pressure is acceptable. 4.  History of bipolar disorder. Mood is stable at this time. 5.  History of hypertension. Blood pressure is acceptable off  antihypotensives. 6.  History of peripheral vascular disease, symptomatically stable. 7.  COPD exacerbation, improved. Okay for discharge from renal perspective. Follow up with me in four  weeks.     Thank you for this consult.         Jo-Ann Avendaño MD    D: 03/24/2019 10:08:12       T: 03/24/2019 16:07:17     [email or web address]  Job#: [id number]     Doc#: [id number]    CC:

## 2019-03-24 NOTE — DISCHARGE SUMMARY
[facility]    [facility] Demographics    Patient. Negrito Ayala  Date of Birth. 1945  MRN. [id number]     Primary care provider. Daphne Bush MD  (Tel: [phone])    Admit date: 3/22/2019    Discharge date 3/24/2019  Note Date: 3/24/2019     Reason for Hospitalization. Chief Complaint   Patient presents with    Shortness of Breath     reports worsening shortness of breath over last few days. Denies any increased swelling but does report productive cough. Received duoneb in route that \"helped. \"  Sats 86% on room air with c/o headache. Significant Findings. Active Problems:    COPD exacerbation ([address]  Resolved Problems:    * No resolved hospital problems. *       Problems and results from this hospitalization that need follow up. 1. CKD:  Needs outpatient follow up with nephrology , Dr Julieta Espinoza   2. COPD:  Needs follow up with pulmonary     Significant test results and incidental findings. 1.     Invasive procedures and treatments. 1. None     Problem-based Hospital Course. The patient was admitted with shortness of breath. She was treated for the following conditions:      2. Acute exacerbation of COPD:  she was treated with nebs and steroids. Shew was weaned from oxygen prior to d/c home. 3. Acute hypoxic respiratory failure due to COPD exacerbation. She was weaned from oxygen   4. CKD:  Creat was stable at d/c. She was seen by Dr Julieta Espinoza and will follow up as an outpatient. 5. Tobacco abuse: She was advised to stop smoking. Consults. IP CONSULT TO HOSPITALIST  IP CONSULT TO SPIRITUAL SERVICES  IP CONSULT TO SOCIAL WORK    Physical examination on discharge day. /70   Pulse 73   Temp 97.8 °F (36.6 °C) (Temporal)   Resp 18   Ht 5' 5\" (1.651 m)   Wt 121 lb 11.2 oz (55.2 kg)   SpO2 94%   BMI 20.25 kg/m²   General appearance. Alert. Looks comfortable. HEENT. Sclera clear. Moist mucus membranes. Cardiovascular. Regular rate and rhythm, normal S1, S2. No murmur. Respiratory. Not using accessory muscles. Clear to auscultation bilaterally, rare wheeze. Gastrointestinal. Abdomen soft, non-tender, not distended, normal bowel sounds  Neurology. Facial symmetry. No speech deficits. Moving all extremities equally. Extremities. No edema in lower extremities. Skin. Warm, dry, normal turgor    Condition at time of discharge stable     Medication instructions provided to patient at discharge. Medication List      START taking these medications    azithromycin 250 MG tablet  Commonly known as:  ZITHROMAX  Take 1 tablet by mouth daily for 5 days     * mometasone-formoterol 200-5 MCG/ACT inhaler  Commonly known as:  DULERA  Inhale 2 puffs into the lungs every 12 hours Note to pharmacist... May substitute for comparable drug if this is not on formulary. .. Call if questions  [phone]     * mometasone-formoterol 200-5 MCG/ACT inhaler  Commonly known as:  DULERA  Inhale 2 puffs into the lungs every 12 hours     nicotine 14 MG/24HR  Commonly known as:  NICODERM CQ  Place 1 patch onto the skin daily     predniSONE 20 MG tablet  Commonly known as:  DELTASONE  Take 1 tablet by mouth daily for 5 days         * This list has 2 medication(s) that are the same as other medications prescribed for you. Read the directions carefully, and ask your doctor or other care provider to review them with you.             CONTINUE taking these medications    acetaminophen 500 MG tablet  Commonly known as:  TYLENOL     ITCHY EYE DROPS OP     metoprolol succinate 25 MG extended release tablet  Commonly known as:  TOPROL XL  TAKE ONE TABLET BY MOUTH ONCE DAILY     venlafaxine 75 MG tablet  Commonly known as:  EFFEXOR  Take 1 tablet by mouth 2 times daily     vitamin B-12 1000 MCG extended release tablet  Take 1 tablet by mouth daily        STOP taking these medications albuterol (2.5 MG/3ML) 0.083% nebulizer solution  Commonly known as:  PROVENTIL     FISH OIL PO     MULTI-VITAMIN PO     olopatadine 0.1 % ophthalmic solution  Commonly known as:  PATANOL           Where to Get Your Medications      These medications were sent to [address] [place] [address] [facility] [place] [address]    Phone:  [phone]   · azithromycin 250 MG tablet  · mometasone-formoterol 200-5 MCG/ACT inhaler  · nicotine 14 MG/24HR  · predniSONE 20 MG tablet  · vitamin B-12 1000 MCG extended release tablet     ·          Discharge recommendations given to patient. Follow Up. in 1 week   Disposition. Home   Activity. As tolerated   Diet: DIET GENERAL;  Dietary Nutrition Supplements: Low Calorie High Protein Supplement      Spent > 30  minutes in discharge process.     Signed:  WINDY Sinha CNP     3/24/2019 7:25 AM

## 2019-03-24 NOTE — CONSULTS
Renal Consult  Full Note Dictated [id number]    7. CKD 3 - baseline crea 1.4 to 1.6. Discussed avoidance of nephrotoxins. Check urine studies and PTH. 2.  Okay for D/C and f/u in office in 4 weeks. Discussed with nurse. Thank you.

## 2019-03-24 NOTE — PROGRESS NOTES
Discharge instructions reviewed with patient including to f/u with pcp and Dr. David Maradiaga in 2-4 weeks, peripheral iv removed, pt declines home care, smoking cessation reviewed with patient, patient and son verbalized understanding, discharged home at 44.

## 2019-03-27 LAB — CULTURE, BLOOD 2: NORMAL

## 2019-04-08 LAB
BLOOD CULTURE, ROUTINE: ABNORMAL
BLOOD CULTURE, ROUTINE: ABNORMAL
Lab: NORMAL
ORGANISM: ABNORMAL
REPORT: NORMAL
THIS TEST SENT TO: NORMAL

## 2019-07-10 DIAGNOSIS — F33.40 RECURRENT MAJOR DEPRESSIVE DISORDER, IN REMISSION (HCC): ICD-10-CM

## 2019-07-10 RX ORDER — VENLAFAXINE 75 MG/1
75 TABLET ORAL 2 TIMES DAILY
Qty: 60 TABLET | Refills: 0 | Status: ON HOLD | OUTPATIENT
Start: 2019-07-10 | End: 2020-01-14 | Stop reason: SDDI

## 2019-08-24 ENCOUNTER — HOSPITAL ENCOUNTER (EMERGENCY)
Age: 74
Discharge: HOME | End: 2019-08-24
Attending: EMERGENCY MEDICINE
Payer: MEDICARE

## 2019-08-24 ENCOUNTER — APPOINTMENT (OUTPATIENT)
Dept: GENERAL RADIOLOGY | Age: 74
End: 2019-08-24
Payer: MEDICARE

## 2019-08-24 VITALS
OXYGEN SATURATION: 96 % | RESPIRATION RATE: 15 BRPM | BODY MASS INDEX: 20.16 KG/M2 | HEIGHT: 65 IN | HEART RATE: 67 BPM | WEIGHT: 121 LBS | TEMPERATURE: 98.1 F | DIASTOLIC BLOOD PRESSURE: 52 MMHG | SYSTOLIC BLOOD PRESSURE: 145 MMHG

## 2019-08-24 DIAGNOSIS — J44.1 COPD EXACERBATION (HCC): Primary | ICD-10-CM

## 2019-08-24 DIAGNOSIS — N18.9 CHRONIC KIDNEY DISEASE, UNSPECIFIED CKD STAGE: ICD-10-CM

## 2019-08-24 LAB
A/G RATIO: 0.8 (ref 1.1–2.2)
ALBUMIN SERPL-MCNC: 3.7 G/DL (ref 3.4–5)
ALP BLD-CCNC: 156 U/L (ref 40–129)
ALT SERPL-CCNC: 12 U/L (ref 10–40)
ANION GAP SERPL CALCULATED.3IONS-SCNC: 11 MMOL/L (ref 3–16)
AST SERPL-CCNC: 23 U/L (ref 15–37)
BASOPHILS ABSOLUTE: 0 K/UL (ref 0–0.2)
BASOPHILS RELATIVE PERCENT: 0.9 %
BILIRUB SERPL-MCNC: <0.2 MG/DL (ref 0–1)
BUN BLDV-MCNC: 32 MG/DL (ref 7–20)
CALCIUM SERPL-MCNC: 8.8 MG/DL (ref 8.3–10.6)
CHLORIDE BLD-SCNC: 109 MMOL/L (ref 99–110)
CO2: 20 MMOL/L (ref 21–32)
CREAT SERPL-MCNC: 1.9 MG/DL (ref 0.6–1.2)
EKG ATRIAL RATE: 67 BPM
EKG DIAGNOSIS: NORMAL
EKG P AXIS: 50 DEGREES
EKG P-R INTERVAL: 170 MS
EKG Q-T INTERVAL: 406 MS
EKG QRS DURATION: 94 MS
EKG QTC CALCULATION (BAZETT): 429 MS
EKG R AXIS: 16 DEGREES
EKG T AXIS: 70 DEGREES
EKG VENTRICULAR RATE: 67 BPM
EOSINOPHILS ABSOLUTE: 0.4 K/UL (ref 0–0.6)
EOSINOPHILS RELATIVE PERCENT: 9.8 %
GFR AFRICAN AMERICAN: 31
GFR NON-AFRICAN AMERICAN: 26
GLOBULIN: 4.6 G/DL
GLUCOSE BLD-MCNC: 99 MG/DL (ref 70–99)
HCT VFR BLD CALC: 39 % (ref 36–48)
HEMOGLOBIN: 12.6 G/DL (ref 12–16)
LYMPHOCYTES ABSOLUTE: 1.4 K/UL (ref 1–5.1)
LYMPHOCYTES RELATIVE PERCENT: 33.8 %
MCH RBC QN AUTO: 31 PG (ref 26–34)
MCHC RBC AUTO-ENTMCNC: 32.4 G/DL (ref 31–36)
MCV RBC AUTO: 95.9 FL (ref 80–100)
MONOCYTES ABSOLUTE: 0.3 K/UL (ref 0–1.3)
MONOCYTES RELATIVE PERCENT: 7.5 %
NEUTROPHILS ABSOLUTE: 2 K/UL (ref 1.7–7.7)
NEUTROPHILS RELATIVE PERCENT: 48 %
PDW BLD-RTO: 14.7 % (ref 12.4–15.4)
PLATELET # BLD: 223 K/UL (ref 135–450)
PMV BLD AUTO: 10.1 FL (ref 5–10.5)
POTASSIUM REFLEX MAGNESIUM: 4.1 MMOL/L (ref 3.5–5.1)
PRO-BNP: 1214 PG/ML (ref 0–449)
RBC # BLD: 4.07 M/UL (ref 4–5.2)
SODIUM BLD-SCNC: 140 MMOL/L (ref 136–145)
TOTAL PROTEIN: 8.3 G/DL (ref 6.4–8.2)
TROPONIN: <0.01 NG/ML
WBC # BLD: 4.1 K/UL (ref 4–11)

## 2019-08-24 PROCEDURE — 80053 COMPREHEN METABOLIC PANEL: CPT

## 2019-08-24 PROCEDURE — 84484 ASSAY OF TROPONIN QUANT: CPT

## 2019-08-24 PROCEDURE — 96374 THER/PROPH/DIAG INJ IV PUSH: CPT

## 2019-08-24 PROCEDURE — 93010 ELECTROCARDIOGRAM REPORT: CPT | Performed by: INTERNAL MEDICINE

## 2019-08-24 PROCEDURE — 85025 COMPLETE CBC W/AUTO DIFF WBC: CPT

## 2019-08-24 PROCEDURE — 6360000002 HC RX W HCPCS: Performed by: EMERGENCY MEDICINE

## 2019-08-24 PROCEDURE — 83880 ASSAY OF NATRIURETIC PEPTIDE: CPT

## 2019-08-24 PROCEDURE — 99285 EMERGENCY DEPT VISIT HI MDM: CPT

## 2019-08-24 PROCEDURE — 6370000000 HC RX 637 (ALT 250 FOR IP): Performed by: EMERGENCY MEDICINE

## 2019-08-24 PROCEDURE — 93005 ELECTROCARDIOGRAM TRACING: CPT | Performed by: EMERGENCY MEDICINE

## 2019-08-24 PROCEDURE — 71045 X-RAY EXAM CHEST 1 VIEW: CPT

## 2019-08-24 PROCEDURE — 94640 AIRWAY INHALATION TREATMENT: CPT

## 2019-08-24 RX ORDER — PREDNISONE 20 MG/1
40 TABLET ORAL DAILY
Qty: 8 TABLET | Refills: 0 | Status: SHIPPED | OUTPATIENT
Start: 2019-08-24 | End: 2019-08-28

## 2019-08-24 RX ORDER — IPRATROPIUM BROMIDE AND ALBUTEROL SULFATE 2.5; .5 MG/3ML; MG/3ML
1 SOLUTION RESPIRATORY (INHALATION) ONCE
Status: COMPLETED | OUTPATIENT
Start: 2019-08-24 | End: 2019-08-24

## 2019-08-24 RX ORDER — ALBUTEROL SULFATE 90 UG/1
2 AEROSOL, METERED RESPIRATORY (INHALATION) EVERY 6 HOURS PRN
Qty: 1 INHALER | Refills: 1 | Status: ON HOLD | OUTPATIENT
Start: 2019-08-24 | End: 2020-01-14 | Stop reason: SDDI

## 2019-08-24 RX ORDER — METHYLPREDNISOLONE SODIUM SUCCINATE 125 MG/2ML
125 INJECTION, POWDER, LYOPHILIZED, FOR SOLUTION INTRAMUSCULAR; INTRAVENOUS ONCE
Status: COMPLETED | OUTPATIENT
Start: 2019-08-24 | End: 2019-08-24

## 2019-08-24 RX ADMIN — METHYLPREDNISOLONE SODIUM SUCCINATE 125 MG: 125 INJECTION, POWDER, FOR SOLUTION INTRAMUSCULAR; INTRAVENOUS at 11:20

## 2019-08-24 RX ADMIN — IPRATROPIUM BROMIDE AND ALBUTEROL SULFATE 1 AMPULE: .5; 3 SOLUTION RESPIRATORY (INHALATION) at 11:43

## 2019-08-24 NOTE — ED PROVIDER NOTES
methylPREDNISolone sodium (SOLU-MEDROL) injection 125 mg (125 mg Intravenous Given 8/24/19 1120)   ipratropium-albuterol (DUONEB) nebulizer solution 1 ampule (1 ampule Inhalation Given 8/24/19 1143)     PROCEDURES:  None    CRITICAL CARE:  None    CONSULTATIONS:  None    MEDICAL DECISION MAKING: Bronwyn Ford is a 66 y.o. female who presented because of breathing difficulty. From review of her records, she does have chronic kidney disease and her levels today are slightly worsened from levels in March but she has had similar levels prior to that. I believe her symptoms are due to COPD exacerbation. We discussed smoking cessation. After evaluation, the patient is comfortably breathing. Based on clinical presentation and diagnostics I do not believe she is experiencing symptoms from acute coronary syndrome, congestive heart failure decompensation, aortic dissection, pulmonary embolism, pneumothorax, myocarditis, Boerhaave syndrome, pericardial tamponade, acute abdomen, profound anemia or metabolic abnormality, etc. Bronwyn Ford was given appropriate discharge instructions. Referral to follow up provider. Discharge Medication List as of 8/24/2019  1:52 PM      START taking these medications    Details   albuterol sulfate  (90 Base) MCG/ACT inhaler Inhale 2 puffs into the lungs every 6 hours as needed for Wheezing At patient's preference may use 60 dose MDI., Disp-1 Inhaler, R-1Print      predniSONE (DELTASONE) 20 MG tablet Take 2 tablets by mouth daily for 4 days, Disp-8 tablet, R-0Print           FOLLOW UP:    Chata Johnson MD  [id number] [employer]  [phone]    Schedule an appointment as soon as possible for a visit       [facility] Emergency Department  [address]  [address]  Go to   If symptoms worsen    FINAL IMPRESSION:    1. COPD exacerbation ([address]    2.  Chronic kidney disease, unspecified CKD stage      (Please note

## 2019-09-24 ENCOUNTER — HOSPITAL ENCOUNTER (EMERGENCY)
Age: 74
Discharge: HOME | End: 2019-09-24
Attending: EMERGENCY MEDICINE
Payer: MEDICARE

## 2019-09-24 ENCOUNTER — APPOINTMENT (OUTPATIENT)
Dept: GENERAL RADIOLOGY | Age: 74
End: 2019-09-24
Payer: MEDICARE

## 2019-09-24 VITALS
RESPIRATION RATE: 19 BRPM | BODY MASS INDEX: 20.66 KG/M2 | DIASTOLIC BLOOD PRESSURE: 53 MMHG | HEART RATE: 65 BPM | TEMPERATURE: 98.5 F | WEIGHT: 124 LBS | SYSTOLIC BLOOD PRESSURE: 139 MMHG | HEIGHT: 65 IN | OXYGEN SATURATION: 96 %

## 2019-09-24 DIAGNOSIS — J44.1 COPD EXACERBATION (HCC): Primary | ICD-10-CM

## 2019-09-24 LAB
ANION GAP SERPL CALCULATED.3IONS-SCNC: 14 MMOL/L (ref 3–16)
BASOPHILS ABSOLUTE: 0.1 K/UL (ref 0–0.2)
BASOPHILS RELATIVE PERCENT: 1.6 %
BUN BLDV-MCNC: 31 MG/DL (ref 7–20)
CALCIUM SERPL-MCNC: 8.9 MG/DL (ref 8.3–10.6)
CHLORIDE BLD-SCNC: 107 MMOL/L (ref 99–110)
CO2: 17 MMOL/L (ref 21–32)
CREAT SERPL-MCNC: 1.9 MG/DL (ref 0.6–1.2)
EOSINOPHILS ABSOLUTE: 0.5 K/UL (ref 0–0.6)
EOSINOPHILS RELATIVE PERCENT: 9.1 %
GFR AFRICAN AMERICAN: 31
GFR NON-AFRICAN AMERICAN: 26
GLUCOSE BLD-MCNC: 97 MG/DL (ref 70–99)
HCT VFR BLD CALC: 37.5 % (ref 36–48)
HEMOGLOBIN: 12 G/DL (ref 12–16)
LYMPHOCYTES ABSOLUTE: 1.9 K/UL (ref 1–5.1)
LYMPHOCYTES RELATIVE PERCENT: 33.1 %
MCH RBC QN AUTO: 31.1 PG (ref 26–34)
MCHC RBC AUTO-ENTMCNC: 32 G/DL (ref 31–36)
MCV RBC AUTO: 97.2 FL (ref 80–100)
MONOCYTES ABSOLUTE: 0.4 K/UL (ref 0–1.3)
MONOCYTES RELATIVE PERCENT: 7.6 %
NEUTROPHILS ABSOLUTE: 2.7 K/UL (ref 1.7–7.7)
NEUTROPHILS RELATIVE PERCENT: 48.6 %
PDW BLD-RTO: 15 % (ref 12.4–15.4)
PLATELET # BLD: 224 K/UL (ref 135–450)
PMV BLD AUTO: 11 FL (ref 5–10.5)
POTASSIUM SERPL-SCNC: 4.6 MMOL/L (ref 3.5–5.1)
PRO-BNP: 862 PG/ML (ref 0–449)
RBC # BLD: 3.86 M/UL (ref 4–5.2)
SODIUM BLD-SCNC: 138 MMOL/L (ref 136–145)
TROPONIN: <0.01 NG/ML
WBC # BLD: 5.6 K/UL (ref 4–11)

## 2019-09-24 PROCEDURE — 93005 ELECTROCARDIOGRAM TRACING: CPT | Performed by: EMERGENCY MEDICINE

## 2019-09-24 PROCEDURE — 85025 COMPLETE CBC W/AUTO DIFF WBC: CPT

## 2019-09-24 PROCEDURE — 6370000000 HC RX 637 (ALT 250 FOR IP): Performed by: EMERGENCY MEDICINE

## 2019-09-24 PROCEDURE — 83880 ASSAY OF NATRIURETIC PEPTIDE: CPT

## 2019-09-24 PROCEDURE — 84484 ASSAY OF TROPONIN QUANT: CPT

## 2019-09-24 PROCEDURE — 71046 X-RAY EXAM CHEST 2 VIEWS: CPT

## 2019-09-24 PROCEDURE — 80048 BASIC METABOLIC PNL TOTAL CA: CPT

## 2019-09-24 PROCEDURE — 99285 EMERGENCY DEPT VISIT HI MDM: CPT

## 2019-09-24 RX ORDER — PREDNISONE 20 MG/1
40 TABLET ORAL ONCE
Status: COMPLETED | OUTPATIENT
Start: 2019-09-24 | End: 2019-09-24

## 2019-09-24 RX ORDER — PREDNISONE 20 MG/1
40 TABLET ORAL DAILY
Qty: 8 TABLET | Refills: 0 | Status: SHIPPED | OUTPATIENT
Start: 2019-09-24 | End: 2019-09-28

## 2019-09-24 RX ADMIN — PREDNISONE 40 MG: 20 TABLET ORAL at 22:37

## 2019-09-25 LAB
EKG ATRIAL RATE: 90 BPM
EKG DIAGNOSIS: NORMAL
EKG P AXIS: 67 DEGREES
EKG P-R INTERVAL: 190 MS
EKG Q-T INTERVAL: 390 MS
EKG QRS DURATION: 92 MS
EKG QTC CALCULATION (BAZETT): 477 MS
EKG R AXIS: 27 DEGREES
EKG T AXIS: 72 DEGREES
EKG VENTRICULAR RATE: 90 BPM

## 2019-09-25 PROCEDURE — 93010 ELECTROCARDIOGRAM REPORT: CPT | Performed by: INTERNAL MEDICINE

## 2019-09-25 NOTE — ED PROVIDER NOTES
CYSTOSCOPY  10/8/12    cytology    CYSTOSCOPY  4/7/14    cysto/cytology    CYSTOSCOPY  04/29/2015    Cytology study    EYE SURGERY      cataract left eye    FEMUR FRACTURE SURGERY Left [id number]    GASTRIC BYPASS SURGERY      JOINT REPLACEMENT      left knee    OTHER SURGICAL HISTORY Left 10/24/2016    left nephroureterectomy    OTHER SURGICAL HISTORY  03/22/2017    RIGHT ureteral stent placement, RIGHT nephrostomy drain placement     TOTAL KNEE ARTHROPLASTY Left      MEDICATIONS:  No current facility-administered medications on file prior to encounter. Current Outpatient Medications on File Prior to Encounter   Medication Sig Dispense Refill    albuterol sulfate  (90 Base) MCG/ACT inhaler Inhale 2 puffs into the lungs every 6 hours as needed for Wheezing At patient's preference may use 60 dose MDI. 1 Inhaler 1    venlafaxine (EFFEXOR) 75 MG tablet Take 1 tablet by mouth 2 times daily 60 tablet 0    nicotine (NICODERM CQ) 14 MG/24HR Place 1 patch onto the skin daily 30 patch 3    mometasone-formoterol (DULERA) 200-5 MCG/ACT inhaler Inhale 2 puffs into the lungs every 12 hours Note to pharmacist... May substitute for comparable drug if this is not on formulary. .. Call if questions  [phone] 1 Inhaler 3    Cyanocobalamin (VITAMIN B-12) 1000 MCG extended release tablet Take 1 tablet by mouth daily 90 tablet 3    mometasone-formoterol (DULERA) 200-5 MCG/ACT inhaler Inhale 2 puffs into the lungs every 12 hours 1 Inhaler 3    Ketotifen Fumarate (ITCHY EYE DROPS OP) Apply to eye 2 times daily Indications: Dry Eyes caused by Deficiency of Tears      metoprolol succinate (TOPROL XL) 25 MG extended release tablet TAKE ONE TABLET BY MOUTH ONCE DAILY 90 tablet 3    acetaminophen (TYLENOL) 500 MG tablet Take 500 mg by mouth every 6 hours as needed for Pain. ALLERGIES  Aspirin; Ibuprofen; Adhesive tape; Hydrochlorothiazide; Microderm base [albolene];  Nsaids; Voltaren [diclofenac sodium]; and PANEL - Abnormal; Notable for the following components:       Result Value    CO2 17 (*)     BUN 31 (*)     CREATININE 1.9 (*)     GFR Non- 26 (*)     GFR  31 (*)     All other components within normal limits    Narrative:     Performed at:  [facility]  [address] [place]   Phone [phone]   BRAIN NATRIURETIC PEPTIDE - Abnormal; Notable for the following components:    Pro- (*)     All other components within normal limits    Narrative:     Performed at:  [facility]  [address]   Phone [phone]   CBC WITH AUTO DIFFERENTIAL - Abnormal; Notable for the following components:    RBC 3.86 (*)     MPV 11.0 (*)     All other components within normal limits    Narrative:     Performed at:  [facility]  [address] [place]   Phone [phone]   TROPONIN    Narrative:     Performed at:  [facility] [address] [place]   Phone [phone]     Previous BUN/crea:    BUN   Date/Time Value Ref Range Status   09/24/2019 08:15 PM 31 (H) 7 - 20 mg/dL Final   08/24/2019 11:19 AM 32 (H) 7 - 20 mg/dL Final   03/24/2019 06:14 AM 34 (H) 7 - 20 mg/dL Final     CREATININE   Date/Time Value Ref Range Status   09/24/2019 08:15 PM 1.9 (H) 0.6 - 1.2 mg/dL Final   08/24/2019 11:19 AM 1.9 (H) 0.6 - 1.2 mg/dL Final   03/24/2019 06:14 AM 1.6 (H) 0.6 - 1.2 mg/dL Final     EKG:  Read by me in the absence of a cardiologist shows:  Sinus rhythm, normal rate, normal conduction intervals, normal axis, no acute injury pattern, NSSTTWA, no major change from prior study     RADIOLOGY:    Plain x-rays were viewed by me:   XR CHEST STANDARD (2 VW)   Final Result   No evidence for acute cardiopulmonary pathology.            ED COURSE:    Medications administered:  Medications   predniSONE

## 2019-11-01 ENCOUNTER — APPOINTMENT (OUTPATIENT)
Dept: GENERAL RADIOLOGY | Age: 74
End: 2019-11-01
Payer: MEDICARE

## 2019-11-01 ENCOUNTER — HOSPITAL ENCOUNTER (EMERGENCY)
Age: 74
Discharge: HOME | End: 2019-11-01
Attending: EMERGENCY MEDICINE
Payer: MEDICARE

## 2019-11-01 VITALS
BODY MASS INDEX: 19.93 KG/M2 | WEIGHT: 124 LBS | HEART RATE: 85 BPM | SYSTOLIC BLOOD PRESSURE: 143 MMHG | OXYGEN SATURATION: 97 % | RESPIRATION RATE: 20 BRPM | HEIGHT: 66 IN | DIASTOLIC BLOOD PRESSURE: 43 MMHG | TEMPERATURE: 97.5 F

## 2019-11-01 DIAGNOSIS — J44.1 COPD EXACERBATION (HCC): Primary | ICD-10-CM

## 2019-11-01 LAB
A/G RATIO: 0.9 (ref 1.1–2.2)
ALBUMIN SERPL-MCNC: 4 G/DL (ref 3.4–5)
ALP BLD-CCNC: 158 U/L (ref 40–129)
ALT SERPL-CCNC: 14 U/L (ref 10–40)
ANION GAP SERPL CALCULATED.3IONS-SCNC: 11 MMOL/L (ref 3–16)
APTT: 29.1 SEC (ref 26–36)
AST SERPL-CCNC: 26 U/L (ref 15–37)
BASOPHILS ABSOLUTE: 0 K/UL (ref 0–0.2)
BASOPHILS RELATIVE PERCENT: 0.7 %
BILIRUB SERPL-MCNC: <0.2 MG/DL (ref 0–1)
BILIRUBIN URINE: NEGATIVE
BLOOD, URINE: ABNORMAL
BUN BLDV-MCNC: 31 MG/DL (ref 7–20)
CALCIUM SERPL-MCNC: 9.5 MG/DL (ref 8.3–10.6)
CHLORIDE BLD-SCNC: 108 MMOL/L (ref 99–110)
CLARITY: ABNORMAL
CO2: 24 MMOL/L (ref 21–32)
COLOR: YELLOW
CREAT SERPL-MCNC: 1.8 MG/DL (ref 0.6–1.2)
EKG ATRIAL RATE: 66 BPM
EKG DIAGNOSIS: NORMAL
EKG P AXIS: 70 DEGREES
EKG P-R INTERVAL: 192 MS
EKG Q-T INTERVAL: 402 MS
EKG QRS DURATION: 94 MS
EKG QTC CALCULATION (BAZETT): 421 MS
EKG R AXIS: 51 DEGREES
EKG T AXIS: 86 DEGREES
EKG VENTRICULAR RATE: 66 BPM
EOSINOPHILS ABSOLUTE: 0.4 K/UL (ref 0–0.6)
EOSINOPHILS RELATIVE PERCENT: 7.3 %
EPITHELIAL CELLS, UA: 3 /HPF (ref 0–5)
GFR AFRICAN AMERICAN: 33
GFR NON-AFRICAN AMERICAN: 27
GLOBULIN: 4.5 G/DL
GLUCOSE BLD-MCNC: 112 MG/DL (ref 70–99)
GLUCOSE URINE: NEGATIVE MG/DL
HCT VFR BLD CALC: 40.5 % (ref 36–48)
HEMOGLOBIN: 13.1 G/DL (ref 12–16)
INR BLD: 1.01 (ref 0.86–1.14)
KETONES, URINE: NEGATIVE MG/DL
LEUKOCYTE ESTERASE, URINE: ABNORMAL
LYMPHOCYTES ABSOLUTE: 1.3 K/UL (ref 1–5.1)
LYMPHOCYTES RELATIVE PERCENT: 25.4 %
MCH RBC QN AUTO: 30.6 PG (ref 26–34)
MCHC RBC AUTO-ENTMCNC: 32.2 G/DL (ref 31–36)
MCV RBC AUTO: 95.1 FL (ref 80–100)
MICROSCOPIC EXAMINATION: YES
MONOCYTES ABSOLUTE: 0.3 K/UL (ref 0–1.3)
MONOCYTES RELATIVE PERCENT: 6.7 %
NEUTROPHILS ABSOLUTE: 3.1 K/UL (ref 1.7–7.7)
NEUTROPHILS RELATIVE PERCENT: 59.9 %
NITRITE, URINE: NEGATIVE
PDW BLD-RTO: 14.2 % (ref 12.4–15.4)
PH UA: 5.5 (ref 5–8)
PLATELET # BLD: 224 K/UL (ref 135–450)
PMV BLD AUTO: 10.6 FL (ref 5–10.5)
POTASSIUM SERPL-SCNC: 4.3 MMOL/L (ref 3.5–5.1)
PRO-BNP: 1224 PG/ML (ref 0–449)
PROTEIN UA: ABNORMAL MG/DL
PROTHROMBIN TIME: 11.5 SEC (ref 9.8–13)
RBC # BLD: 4.26 M/UL (ref 4–5.2)
RBC UA: 21 /HPF (ref 0–4)
SODIUM BLD-SCNC: 143 MMOL/L (ref 136–145)
SPECIFIC GRAVITY UA: 1.01 (ref 1–1.03)
TOTAL PROTEIN: 8.5 G/DL (ref 6.4–8.2)
TROPONIN: <0.01 NG/ML
TROPONIN: <0.01 NG/ML
URINE REFLEX TO CULTURE: YES
URINE TYPE: ABNORMAL
UROBILINOGEN, URINE: 0.2 E.U./DL
WBC # BLD: 5.2 K/UL (ref 4–11)
WBC UA: 10 /HPF (ref 0–5)

## 2019-11-01 PROCEDURE — 84484 ASSAY OF TROPONIN QUANT: CPT

## 2019-11-01 PROCEDURE — 85730 THROMBOPLASTIN TIME PARTIAL: CPT

## 2019-11-01 PROCEDURE — 99285 EMERGENCY DEPT VISIT HI MDM: CPT

## 2019-11-01 PROCEDURE — 6360000002 HC RX W HCPCS: Performed by: EMERGENCY MEDICINE

## 2019-11-01 PROCEDURE — 83880 ASSAY OF NATRIURETIC PEPTIDE: CPT

## 2019-11-01 PROCEDURE — 81001 URINALYSIS AUTO W/SCOPE: CPT

## 2019-11-01 PROCEDURE — 85610 PROTHROMBIN TIME: CPT

## 2019-11-01 PROCEDURE — 93005 ELECTROCARDIOGRAM TRACING: CPT | Performed by: PHYSICIAN ASSISTANT

## 2019-11-01 PROCEDURE — 87086 URINE CULTURE/COLONY COUNT: CPT

## 2019-11-01 PROCEDURE — 85025 COMPLETE CBC W/AUTO DIFF WBC: CPT

## 2019-11-01 PROCEDURE — 94640 AIRWAY INHALATION TREATMENT: CPT

## 2019-11-01 PROCEDURE — 71046 X-RAY EXAM CHEST 2 VIEWS: CPT

## 2019-11-01 PROCEDURE — 96374 THER/PROPH/DIAG INJ IV PUSH: CPT

## 2019-11-01 PROCEDURE — 93010 ELECTROCARDIOGRAM REPORT: CPT | Performed by: INTERNAL MEDICINE

## 2019-11-01 PROCEDURE — 80053 COMPREHEN METABOLIC PANEL: CPT

## 2019-11-01 PROCEDURE — 6370000000 HC RX 637 (ALT 250 FOR IP): Performed by: EMERGENCY MEDICINE

## 2019-11-01 RX ORDER — ALBUTEROL SULFATE 90 UG/1
2 AEROSOL, METERED RESPIRATORY (INHALATION) EVERY 4 HOURS PRN
Qty: 1 INHALER | Refills: 0 | Status: ON HOLD | OUTPATIENT
Start: 2019-11-01 | End: 2020-01-14 | Stop reason: SDDI

## 2019-11-01 RX ORDER — PREDNISONE 10 MG/1
60 TABLET ORAL DAILY
Qty: 30 TABLET | Refills: 0 | Status: SHIPPED | OUTPATIENT
Start: 2019-11-01 | End: 2019-11-06

## 2019-11-01 RX ORDER — METHYLPREDNISOLONE SODIUM SUCCINATE 125 MG/2ML
125 INJECTION, POWDER, LYOPHILIZED, FOR SOLUTION INTRAMUSCULAR; INTRAVENOUS ONCE
Status: COMPLETED | OUTPATIENT
Start: 2019-11-01 | End: 2019-11-01

## 2019-11-01 RX ORDER — IPRATROPIUM BROMIDE AND ALBUTEROL SULFATE 2.5; .5 MG/3ML; MG/3ML
1 SOLUTION RESPIRATORY (INHALATION) ONCE
Status: COMPLETED | OUTPATIENT
Start: 2019-11-01 | End: 2019-11-01

## 2019-11-01 RX ADMIN — IPRATROPIUM BROMIDE AND ALBUTEROL SULFATE 1 AMPULE: .5; 3 SOLUTION RESPIRATORY (INHALATION) at 13:41

## 2019-11-01 RX ADMIN — METHYLPREDNISOLONE SODIUM SUCCINATE 125 MG: 125 INJECTION, POWDER, FOR SOLUTION INTRAMUSCULAR; INTRAVENOUS at 13:49

## 2019-11-01 ASSESSMENT — ENCOUNTER SYMPTOMS
COUGH: 0
SHORTNESS OF BREATH: 1
VOMITING: 0
NAUSEA: 0
ABDOMINAL PAIN: 0
DIARRHEA: 0
RHINORRHEA: 0

## 2019-11-02 LAB — URINE CULTURE, ROUTINE: NORMAL

## 2020-01-01 ENCOUNTER — HOSPITAL ENCOUNTER (EMERGENCY)
Age: 75
Discharge: HOME | End: 2020-09-19
Attending: EMERGENCY MEDICINE
Payer: MEDICARE

## 2020-01-01 ENCOUNTER — TELEPHONE (OUTPATIENT)
Dept: FAMILY MEDICINE CLINIC | Age: 75
End: 2020-01-01

## 2020-01-01 ENCOUNTER — CARE COORDINATION (OUTPATIENT)
Dept: CARE COORDINATION | Age: 75
End: 2020-01-01

## 2020-01-01 ENCOUNTER — HOSPITAL ENCOUNTER (EMERGENCY)
Age: 75
Discharge: HOME | End: 2020-09-13
Attending: EMERGENCY MEDICINE
Payer: MEDICARE

## 2020-01-01 ENCOUNTER — APPOINTMENT (OUTPATIENT)
Dept: GENERAL RADIOLOGY | Age: 75
End: 2020-01-01
Payer: MEDICARE

## 2020-01-01 ENCOUNTER — HOSPITAL ENCOUNTER (EMERGENCY)
Age: 75
Discharge: HOME | End: 2020-09-07
Attending: EMERGENCY MEDICINE
Payer: MEDICARE

## 2020-01-01 VITALS
RESPIRATION RATE: 20 BRPM | HEIGHT: 66 IN | BODY MASS INDEX: 22.5 KG/M2 | DIASTOLIC BLOOD PRESSURE: 47 MMHG | WEIGHT: 140 LBS | SYSTOLIC BLOOD PRESSURE: 156 MMHG | HEART RATE: 79 BPM | OXYGEN SATURATION: 96 % | TEMPERATURE: 97.8 F

## 2020-01-01 VITALS
SYSTOLIC BLOOD PRESSURE: 139 MMHG | HEART RATE: 81 BPM | HEIGHT: 66 IN | TEMPERATURE: 98.2 F | BODY MASS INDEX: 21.05 KG/M2 | DIASTOLIC BLOOD PRESSURE: 44 MMHG | WEIGHT: 131 LBS | RESPIRATION RATE: 25 BRPM | OXYGEN SATURATION: 94 %

## 2020-01-01 VITALS
DIASTOLIC BLOOD PRESSURE: 56 MMHG | BODY MASS INDEX: 21.69 KG/M2 | SYSTOLIC BLOOD PRESSURE: 139 MMHG | HEIGHT: 66 IN | TEMPERATURE: 98.2 F | OXYGEN SATURATION: 96 % | WEIGHT: 135 LBS | RESPIRATION RATE: 20 BRPM | HEART RATE: 74 BPM

## 2020-01-01 LAB
A/G RATIO: 0.9 (ref 1.1–2.2)
ALBUMIN SERPL-MCNC: 3.4 G/DL (ref 3.4–5)
ALP BLD-CCNC: 174 U/L (ref 40–129)
ALT SERPL-CCNC: 11 U/L (ref 10–40)
ANION GAP SERPL CALCULATED.3IONS-SCNC: 10 MMOL/L (ref 3–16)
ANION GAP SERPL CALCULATED.3IONS-SCNC: 9 MMOL/L (ref 3–16)
AST SERPL-CCNC: 23 U/L (ref 15–37)
BASOPHILS ABSOLUTE: 0 K/UL (ref 0–0.2)
BASOPHILS ABSOLUTE: 0 K/UL (ref 0–0.2)
BASOPHILS RELATIVE PERCENT: 0.8 %
BASOPHILS RELATIVE PERCENT: 0.8 %
BILIRUB SERPL-MCNC: <0.2 MG/DL (ref 0–1)
BLOOD CULTURE, ROUTINE: NORMAL
BUN BLDV-MCNC: 34 MG/DL (ref 7–20)
BUN BLDV-MCNC: 41 MG/DL (ref 7–20)
CALCIUM SERPL-MCNC: 8.5 MG/DL (ref 8.3–10.6)
CALCIUM SERPL-MCNC: 8.6 MG/DL (ref 8.3–10.6)
CHLORIDE BLD-SCNC: 104 MMOL/L (ref 99–110)
CHLORIDE BLD-SCNC: 107 MMOL/L (ref 99–110)
CO2: 20 MMOL/L (ref 21–32)
CO2: 22 MMOL/L (ref 21–32)
CREAT SERPL-MCNC: 1.8 MG/DL (ref 0.6–1.2)
CREAT SERPL-MCNC: 2 MG/DL (ref 0.6–1.2)
CULTURE, BLOOD 2: NORMAL
EKG ATRIAL RATE: 77 BPM
EKG DIAGNOSIS: NORMAL
EKG P AXIS: 60 DEGREES
EKG P-R INTERVAL: 168 MS
EKG Q-T INTERVAL: 392 MS
EKG QRS DURATION: 86 MS
EKG QTC CALCULATION (BAZETT): 443 MS
EKG R AXIS: 20 DEGREES
EKG T AXIS: 62 DEGREES
EKG VENTRICULAR RATE: 77 BPM
EOSINOPHILS ABSOLUTE: 0.6 K/UL (ref 0–0.6)
EOSINOPHILS ABSOLUTE: 0.6 K/UL (ref 0–0.6)
EOSINOPHILS RELATIVE PERCENT: 11.4 %
EOSINOPHILS RELATIVE PERCENT: 11.5 %
GFR AFRICAN AMERICAN: 29
GFR AFRICAN AMERICAN: 33
GFR NON-AFRICAN AMERICAN: 24
GFR NON-AFRICAN AMERICAN: 27
GLOBULIN: 4 G/DL
GLUCOSE BLD-MCNC: 111 MG/DL (ref 70–99)
GLUCOSE BLD-MCNC: 94 MG/DL (ref 70–99)
HCT VFR BLD CALC: 30.1 % (ref 36–48)
HCT VFR BLD CALC: 31.2 % (ref 36–48)
HEMOGLOBIN: 9.7 G/DL (ref 12–16)
HEMOGLOBIN: 9.8 G/DL (ref 12–16)
LACTATE DEHYDROGENASE: 296 U/L (ref 100–190)
LACTIC ACID, SEPSIS: 1.5 MMOL/L (ref 0.4–1.9)
LACTIC ACID: 0.9 MMOL/L (ref 0.4–2)
LYMPHOCYTES ABSOLUTE: 1.1 K/UL (ref 1–5.1)
LYMPHOCYTES ABSOLUTE: 1.5 K/UL (ref 1–5.1)
LYMPHOCYTES RELATIVE PERCENT: 23.6 %
LYMPHOCYTES RELATIVE PERCENT: 31 %
MAGNESIUM: 2.1 MG/DL (ref 1.8–2.4)
MCH RBC QN AUTO: 27.1 PG (ref 26–34)
MCH RBC QN AUTO: 28.5 PG (ref 26–34)
MCHC RBC AUTO-ENTMCNC: 31.4 G/DL (ref 31–36)
MCHC RBC AUTO-ENTMCNC: 32.3 G/DL (ref 31–36)
MCV RBC AUTO: 86.4 FL (ref 80–100)
MCV RBC AUTO: 88.3 FL (ref 80–100)
MONOCYTES ABSOLUTE: 0.4 K/UL (ref 0–1.3)
MONOCYTES ABSOLUTE: 0.6 K/UL (ref 0–1.3)
MONOCYTES RELATIVE PERCENT: 12.9 %
MONOCYTES RELATIVE PERCENT: 9 %
NEUTROPHILS ABSOLUTE: 2.4 K/UL (ref 1.7–7.7)
NEUTROPHILS ABSOLUTE: 2.5 K/UL (ref 1.7–7.7)
NEUTROPHILS RELATIVE PERCENT: 47.8 %
NEUTROPHILS RELATIVE PERCENT: 51.2 %
PDW BLD-RTO: 15.1 % (ref 12.4–15.4)
PDW BLD-RTO: 15.6 % (ref 12.4–15.4)
PLATELET # BLD: 233 K/UL (ref 135–450)
PLATELET # BLD: 266 K/UL (ref 135–450)
PMV BLD AUTO: 9.3 FL (ref 5–10.5)
PMV BLD AUTO: 9.5 FL (ref 5–10.5)
POTASSIUM REFLEX MAGNESIUM: 4.2 MMOL/L (ref 3.5–5.1)
POTASSIUM SERPL-SCNC: 4.8 MMOL/L (ref 3.5–5.1)
PRO-BNP: 1659 PG/ML (ref 0–449)
PRO-BNP: 883 PG/ML (ref 0–449)
RBC # BLD: 3.41 M/UL (ref 4–5.2)
RBC # BLD: 3.61 M/UL (ref 4–5.2)
REASON FOR REJECTION: NORMAL
REJECTED TEST: NORMAL
SARS-COV-2, PCR: NOT DETECTED
SODIUM BLD-SCNC: 135 MMOL/L (ref 136–145)
SODIUM BLD-SCNC: 137 MMOL/L (ref 136–145)
TOTAL PROTEIN: 7.4 G/DL (ref 6.4–8.2)
TROPONIN: <0.01 NG/ML
TROPONIN: <0.01 NG/ML
WBC # BLD: 4.8 K/UL (ref 4–11)
WBC # BLD: 5 K/UL (ref 4–11)

## 2020-01-01 PROCEDURE — 84484 ASSAY OF TROPONIN QUANT: CPT

## 2020-01-01 PROCEDURE — 96374 THER/PROPH/DIAG INJ IV PUSH: CPT

## 2020-01-01 PROCEDURE — 94640 AIRWAY INHALATION TREATMENT: CPT

## 2020-01-01 PROCEDURE — 83880 ASSAY OF NATRIURETIC PEPTIDE: CPT

## 2020-01-01 PROCEDURE — 71045 X-RAY EXAM CHEST 1 VIEW: CPT

## 2020-01-01 PROCEDURE — 93005 ELECTROCARDIOGRAM TRACING: CPT | Performed by: EMERGENCY MEDICINE

## 2020-01-01 PROCEDURE — 94760 N-INVAS EAR/PLS OXIMETRY 1: CPT

## 2020-01-01 PROCEDURE — 83735 ASSAY OF MAGNESIUM: CPT

## 2020-01-01 PROCEDURE — 6370000000 HC RX 637 (ALT 250 FOR IP): Performed by: NURSE PRACTITIONER

## 2020-01-01 PROCEDURE — 6360000002 HC RX W HCPCS: Performed by: NURSE PRACTITIONER

## 2020-01-01 PROCEDURE — 83605 ASSAY OF LACTIC ACID: CPT

## 2020-01-01 PROCEDURE — 6370000000 HC RX 637 (ALT 250 FOR IP): Performed by: EMERGENCY MEDICINE

## 2020-01-01 PROCEDURE — U0003 INFECTIOUS AGENT DETECTION BY NUCLEIC ACID (DNA OR RNA); SEVERE ACUTE RESPIRATORY SYNDROME CORONAVIRUS 2 (SARS-COV-2) (CORONAVIRUS DISEASE [COVID-19]), AMPLIFIED PROBE TECHNIQUE, MAKING USE OF HIGH THROUGHPUT TECHNOLOGIES AS DESCRIBED BY CMS-2020-01-R: HCPCS

## 2020-01-01 PROCEDURE — 99291 CRITICAL CARE FIRST HOUR: CPT

## 2020-01-01 PROCEDURE — 6360000002 HC RX W HCPCS: Performed by: EMERGENCY MEDICINE

## 2020-01-01 PROCEDURE — 85025 COMPLETE CBC W/AUTO DIFF WBC: CPT

## 2020-01-01 PROCEDURE — 93010 ELECTROCARDIOGRAM REPORT: CPT | Performed by: INTERNAL MEDICINE

## 2020-01-01 PROCEDURE — 80048 BASIC METABOLIC PNL TOTAL CA: CPT

## 2020-01-01 PROCEDURE — 36415 COLL VENOUS BLD VENIPUNCTURE: CPT

## 2020-01-01 PROCEDURE — 83615 LACTATE (LD) (LDH) ENZYME: CPT

## 2020-01-01 PROCEDURE — 87040 BLOOD CULTURE FOR BACTERIA: CPT

## 2020-01-01 PROCEDURE — 99284 EMERGENCY DEPT VISIT MOD MDM: CPT

## 2020-01-01 PROCEDURE — 6370000000 HC RX 637 (ALT 250 FOR IP): Performed by: PHYSICIAN ASSISTANT

## 2020-01-01 PROCEDURE — 99285 EMERGENCY DEPT VISIT HI MDM: CPT

## 2020-01-01 PROCEDURE — 80053 COMPREHEN METABOLIC PANEL: CPT

## 2020-01-01 RX ORDER — ALBUTEROL SULFATE 90 UG/1
AEROSOL, METERED RESPIRATORY (INHALATION)
Qty: 1 INHALER | Refills: 0 | Status: SHIPPED | OUTPATIENT
Start: 2020-01-01

## 2020-01-01 RX ORDER — IPRATROPIUM BROMIDE AND ALBUTEROL SULFATE 2.5; .5 MG/3ML; MG/3ML
1 SOLUTION RESPIRATORY (INHALATION) ONCE
Status: COMPLETED | OUTPATIENT
Start: 2020-01-01 | End: 2020-01-01

## 2020-01-01 RX ORDER — ALBUTEROL SULFATE 2.5 MG/3ML
5 SOLUTION RESPIRATORY (INHALATION) ONCE
Status: COMPLETED | OUTPATIENT
Start: 2020-01-01 | End: 2020-01-01

## 2020-01-01 RX ORDER — IPRATROPIUM BROMIDE AND ALBUTEROL SULFATE 2.5; .5 MG/3ML; MG/3ML
1 SOLUTION RESPIRATORY (INHALATION) EVERY 4 HOURS
Qty: 360 ML | Refills: 0 | Status: SHIPPED | OUTPATIENT
Start: 2020-01-01 | End: 2020-01-01 | Stop reason: SDUPTHER

## 2020-01-01 RX ORDER — DEXAMETHASONE SODIUM PHOSPHATE 10 MG/ML
6 INJECTION, SOLUTION INTRAMUSCULAR; INTRAVENOUS ONCE
Status: COMPLETED | OUTPATIENT
Start: 2020-01-01 | End: 2020-01-01

## 2020-01-01 RX ORDER — PREDNISONE 10 MG/1
60 TABLET ORAL DAILY
Qty: 30 TABLET | Refills: 0 | Status: SHIPPED | OUTPATIENT
Start: 2020-01-01 | End: 2020-01-01

## 2020-01-01 RX ORDER — METHYLPREDNISOLONE SODIUM SUCCINATE 125 MG/2ML
125 INJECTION, POWDER, LYOPHILIZED, FOR SOLUTION INTRAMUSCULAR; INTRAVENOUS ONCE
Status: COMPLETED | OUTPATIENT
Start: 2020-01-01 | End: 2020-01-01

## 2020-01-01 RX ORDER — IPRATROPIUM BROMIDE AND ALBUTEROL SULFATE 2.5; .5 MG/3ML; MG/3ML
1 SOLUTION RESPIRATORY (INHALATION) EVERY 4 HOURS PRN
Qty: 360 ML | Refills: 0 | Status: ON HOLD | OUTPATIENT
Start: 2020-01-01 | End: 2021-01-01 | Stop reason: SDUPTHER

## 2020-01-01 RX ADMIN — DEXAMETHASONE SODIUM PHOSPHATE 6 MG: 10 INJECTION, SOLUTION INTRAMUSCULAR; INTRAVENOUS at 18:23

## 2020-01-01 RX ADMIN — IPRATROPIUM BROMIDE AND ALBUTEROL SULFATE 1 AMPULE: .5; 3 SOLUTION RESPIRATORY (INHALATION) at 16:13

## 2020-01-01 RX ADMIN — IPRATROPIUM BROMIDE AND ALBUTEROL SULFATE 1 AMPULE: .5; 3 SOLUTION RESPIRATORY (INHALATION) at 02:29

## 2020-01-01 RX ADMIN — METHYLPREDNISOLONE SODIUM SUCCINATE 125 MG: 125 INJECTION, POWDER, FOR SOLUTION INTRAMUSCULAR; INTRAVENOUS at 22:08

## 2020-01-01 RX ADMIN — ALBUTEROL SULFATE 5 MG: 2.5 SOLUTION RESPIRATORY (INHALATION) at 20:29

## 2020-01-01 RX ADMIN — IPRATROPIUM BROMIDE AND ALBUTEROL SULFATE 1 AMPULE: .5; 3 SOLUTION RESPIRATORY (INHALATION) at 20:29

## 2020-01-01 ASSESSMENT — ENCOUNTER SYMPTOMS
DIARRHEA: 0
BLOOD IN STOOL: 0
SHORTNESS OF BREATH: 1
VOMITING: 0
NAUSEA: 0
SORE THROAT: 0
RHINORRHEA: 0
ABDOMINAL PAIN: 0
CONSTIPATION: 0

## 2020-01-14 ENCOUNTER — HOSPITAL ENCOUNTER (INPATIENT)
Age: 75
LOS: 5 days | Discharge: HOME HEALTH | DRG: 191 | End: 2020-01-19
Attending: FAMILY MEDICINE | Admitting: INTERNAL MEDICINE
Payer: MEDICARE

## 2020-01-14 ENCOUNTER — APPOINTMENT (OUTPATIENT)
Dept: GENERAL RADIOLOGY | Age: 75
DRG: 191 | End: 2020-01-14
Payer: MEDICARE

## 2020-01-14 ENCOUNTER — APPOINTMENT (OUTPATIENT)
Dept: ULTRASOUND IMAGING | Age: 75
DRG: 191 | End: 2020-01-14
Payer: MEDICARE

## 2020-01-14 PROBLEM — J45.901 ACUTE ASTHMA EXACERBATION: Status: ACTIVE | Noted: 2020-01-14

## 2020-01-14 LAB
A/G RATIO: 0.9 (ref 1.1–2.2)
ALBUMIN SERPL-MCNC: 3.6 G/DL (ref 3.4–5)
ALP BLD-CCNC: 139 U/L (ref 40–129)
ALT SERPL-CCNC: 8 U/L (ref 10–40)
ANION GAP SERPL CALCULATED.3IONS-SCNC: 13 MMOL/L (ref 3–16)
AST SERPL-CCNC: 15 U/L (ref 15–37)
BILIRUB SERPL-MCNC: <0.2 MG/DL (ref 0–1)
BILIRUBIN URINE: NEGATIVE
BLOOD, URINE: ABNORMAL
BUN BLDV-MCNC: 48 MG/DL (ref 7–20)
CALCIUM SERPL-MCNC: 8.9 MG/DL (ref 8.3–10.6)
CHLORIDE BLD-SCNC: 107 MMOL/L (ref 99–110)
CLARITY: ABNORMAL
CO2: 19 MMOL/L (ref 21–32)
COLOR: YELLOW
CREAT SERPL-MCNC: 2.4 MG/DL (ref 0.6–1.2)
EKG ATRIAL RATE: 66 BPM
EKG DIAGNOSIS: NORMAL
EKG P AXIS: 67 DEGREES
EKG P-R INTERVAL: 200 MS
EKG Q-T INTERVAL: 418 MS
EKG QRS DURATION: 94 MS
EKG QTC CALCULATION (BAZETT): 438 MS
EKG R AXIS: 27 DEGREES
EKG T AXIS: 68 DEGREES
EKG VENTRICULAR RATE: 66 BPM
EPITHELIAL CELLS, UA: 2 /HPF (ref 0–5)
GFR AFRICAN AMERICAN: 24
GFR NON-AFRICAN AMERICAN: 20
GLOBULIN: 4.2 G/DL
GLUCOSE BLD-MCNC: 108 MG/DL (ref 70–99)
GLUCOSE URINE: NEGATIVE MG/DL
HCT VFR BLD CALC: 36.9 % (ref 36–48)
HEMOGLOBIN: 12.1 G/DL (ref 12–16)
HYALINE CASTS: 2 /LPF (ref 0–8)
KETONES, URINE: NEGATIVE MG/DL
LEUKOCYTE ESTERASE, URINE: ABNORMAL
MCH RBC QN AUTO: 31.4 PG (ref 26–34)
MCHC RBC AUTO-ENTMCNC: 32.8 G/DL (ref 31–36)
MCV RBC AUTO: 95.8 FL (ref 80–100)
MICROSCOPIC EXAMINATION: YES
NITRITE, URINE: NEGATIVE
PDW BLD-RTO: 13.6 % (ref 12.4–15.4)
PH UA: 5.5 (ref 5–8)
PLATELET # BLD: 207 K/UL (ref 135–450)
PMV BLD AUTO: 10.4 FL (ref 5–10.5)
POTASSIUM SERPL-SCNC: 4.4 MMOL/L (ref 3.5–5.1)
PROTEIN UA: ABNORMAL MG/DL
RAPID INFLUENZA  B AGN: NEGATIVE
RAPID INFLUENZA A AGN: NEGATIVE
RBC # BLD: 3.85 M/UL (ref 4–5.2)
RBC UA: ABNORMAL /HPF (ref 0–2)
SODIUM BLD-SCNC: 139 MMOL/L (ref 136–145)
SPECIFIC GRAVITY UA: 1.02 (ref 1–1.03)
TOTAL PROTEIN: 7.8 G/DL (ref 6.4–8.2)
TROPONIN: 0.03 NG/ML
URINE TYPE: ABNORMAL
UROBILINOGEN, URINE: 0.2 E.U./DL
WBC # BLD: 6.6 K/UL (ref 4–11)
WBC UA: 15 /HPF (ref 0–5)

## 2020-01-14 PROCEDURE — 96366 THER/PROPH/DIAG IV INF ADDON: CPT

## 2020-01-14 PROCEDURE — 96372 THER/PROPH/DIAG INJ SC/IM: CPT

## 2020-01-14 PROCEDURE — 6360000002 HC RX W HCPCS: Performed by: INTERNAL MEDICINE

## 2020-01-14 PROCEDURE — 6370000000 HC RX 637 (ALT 250 FOR IP): Performed by: FAMILY MEDICINE

## 2020-01-14 PROCEDURE — 87449 NOS EACH ORGANISM AG IA: CPT

## 2020-01-14 PROCEDURE — 71046 X-RAY EXAM CHEST 2 VIEWS: CPT

## 2020-01-14 PROCEDURE — 80053 COMPREHEN METABOLIC PANEL: CPT

## 2020-01-14 PROCEDURE — 81001 URINALYSIS AUTO W/SCOPE: CPT

## 2020-01-14 PROCEDURE — 84133 ASSAY OF URINE POTASSIUM: CPT

## 2020-01-14 PROCEDURE — 2580000003 HC RX 258: Performed by: INTERNAL MEDICINE

## 2020-01-14 PROCEDURE — 94640 AIRWAY INHALATION TREATMENT: CPT

## 2020-01-14 PROCEDURE — 6370000000 HC RX 637 (ALT 250 FOR IP): Performed by: INTERNAL MEDICINE

## 2020-01-14 PROCEDURE — 1200000000 HC SEMI PRIVATE

## 2020-01-14 PROCEDURE — 85027 COMPLETE CBC AUTOMATED: CPT

## 2020-01-14 PROCEDURE — 84540 ASSAY OF URINE/UREA-N: CPT

## 2020-01-14 PROCEDURE — 2580000003 HC RX 258: Performed by: FAMILY MEDICINE

## 2020-01-14 PROCEDURE — 87804 INFLUENZA ASSAY W/OPTIC: CPT

## 2020-01-14 PROCEDURE — 6370000000 HC RX 637 (ALT 250 FOR IP): Performed by: PHYSICIAN ASSISTANT

## 2020-01-14 PROCEDURE — 84300 ASSAY OF URINE SODIUM: CPT

## 2020-01-14 PROCEDURE — 36415 COLL VENOUS BLD VENIPUNCTURE: CPT

## 2020-01-14 PROCEDURE — 84484 ASSAY OF TROPONIN QUANT: CPT

## 2020-01-14 PROCEDURE — 94761 N-INVAS EAR/PLS OXIMETRY MLT: CPT

## 2020-01-14 PROCEDURE — 6360000002 HC RX W HCPCS: Performed by: HOSPITALIST

## 2020-01-14 PROCEDURE — 6360000002 HC RX W HCPCS: Performed by: FAMILY MEDICINE

## 2020-01-14 PROCEDURE — 76770 US EXAM ABDO BACK WALL COMP: CPT

## 2020-01-14 PROCEDURE — 93005 ELECTROCARDIOGRAM TRACING: CPT | Performed by: FAMILY MEDICINE

## 2020-01-14 PROCEDURE — 82436 ASSAY OF URINE CHLORIDE: CPT

## 2020-01-14 PROCEDURE — 82570 ASSAY OF URINE CREATININE: CPT

## 2020-01-14 PROCEDURE — 96365 THER/PROPH/DIAG IV INF INIT: CPT

## 2020-01-14 PROCEDURE — 96375 TX/PRO/DX INJ NEW DRUG ADDON: CPT

## 2020-01-14 PROCEDURE — 99285 EMERGENCY DEPT VISIT HI MDM: CPT

## 2020-01-14 PROCEDURE — 93010 ELECTROCARDIOGRAM REPORT: CPT | Performed by: INTERNAL MEDICINE

## 2020-01-14 RX ORDER — METOPROLOL SUCCINATE 25 MG/1
25 TABLET, EXTENDED RELEASE ORAL DAILY
Status: DISCONTINUED | OUTPATIENT
Start: 2020-01-14 | End: 2020-01-19 | Stop reason: HOSPADM

## 2020-01-14 RX ORDER — ACETAMINOPHEN 325 MG/1
650 TABLET ORAL EVERY 4 HOURS PRN
Status: DISCONTINUED | OUTPATIENT
Start: 2020-01-14 | End: 2020-01-14 | Stop reason: SDUPTHER

## 2020-01-14 RX ORDER — VENLAFAXINE 37.5 MG/1
75 TABLET ORAL 2 TIMES DAILY
Status: DISCONTINUED | OUTPATIENT
Start: 2020-01-14 | End: 2020-01-19 | Stop reason: HOSPADM

## 2020-01-14 RX ORDER — DEXAMETHASONE SODIUM PHOSPHATE 4 MG/ML
4 INJECTION, SOLUTION INTRA-ARTICULAR; INTRALESIONAL; INTRAMUSCULAR; INTRAVENOUS; SOFT TISSUE ONCE
Status: COMPLETED | OUTPATIENT
Start: 2020-01-14 | End: 2020-01-14

## 2020-01-14 RX ORDER — IPRATROPIUM BROMIDE AND ALBUTEROL SULFATE 2.5; .5 MG/3ML; MG/3ML
3 SOLUTION RESPIRATORY (INHALATION) ONCE
Status: COMPLETED | OUTPATIENT
Start: 2020-01-14 | End: 2020-01-14

## 2020-01-14 RX ORDER — ACETAMINOPHEN 325 MG/1
650 TABLET ORAL EVERY 6 HOURS PRN
Status: DISCONTINUED | OUTPATIENT
Start: 2020-01-14 | End: 2020-01-19 | Stop reason: HOSPADM

## 2020-01-14 RX ORDER — HEPARIN SODIUM 5000 [USP'U]/ML
5000 INJECTION, SOLUTION INTRAVENOUS; SUBCUTANEOUS EVERY 8 HOURS SCHEDULED
Status: DISCONTINUED | OUTPATIENT
Start: 2020-01-14 | End: 2020-01-19 | Stop reason: HOSPADM

## 2020-01-14 RX ORDER — MAGNESIUM SULFATE IN WATER 40 MG/ML
2 INJECTION, SOLUTION INTRAVENOUS ONCE
Status: COMPLETED | OUTPATIENT
Start: 2020-01-14 | End: 2020-01-14

## 2020-01-14 RX ORDER — NICOTINE 21 MG/24HR
1 PATCH, TRANSDERMAL 24 HOURS TRANSDERMAL DAILY
Status: DISCONTINUED | OUTPATIENT
Start: 2020-01-14 | End: 2020-01-19 | Stop reason: HOSPADM

## 2020-01-14 RX ORDER — ONDANSETRON 2 MG/ML
4 INJECTION INTRAMUSCULAR; INTRAVENOUS EVERY 6 HOURS PRN
Status: DISCONTINUED | OUTPATIENT
Start: 2020-01-14 | End: 2020-01-19 | Stop reason: HOSPADM

## 2020-01-14 RX ORDER — SODIUM CHLORIDE 0.9 % (FLUSH) 0.9 %
10 SYRINGE (ML) INJECTION PRN
Status: DISCONTINUED | OUTPATIENT
Start: 2020-01-14 | End: 2020-01-19 | Stop reason: HOSPADM

## 2020-01-14 RX ORDER — 0.9 % SODIUM CHLORIDE 0.9 %
1000 INTRAVENOUS SOLUTION INTRAVENOUS ONCE
Status: COMPLETED | OUTPATIENT
Start: 2020-01-14 | End: 2020-01-14

## 2020-01-14 RX ORDER — IPRATROPIUM BROMIDE AND ALBUTEROL SULFATE 2.5; .5 MG/3ML; MG/3ML
1 SOLUTION RESPIRATORY (INHALATION)
Status: DISCONTINUED | OUTPATIENT
Start: 2020-01-14 | End: 2020-01-19 | Stop reason: HOSPADM

## 2020-01-14 RX ORDER — ALBUTEROL SULFATE 2.5 MG/3ML
2.5 SOLUTION RESPIRATORY (INHALATION)
Status: DISCONTINUED | OUTPATIENT
Start: 2020-01-14 | End: 2020-01-19 | Stop reason: HOSPADM

## 2020-01-14 RX ORDER — SODIUM CHLORIDE 9 MG/ML
INJECTION, SOLUTION INTRAVENOUS CONTINUOUS
Status: ACTIVE | OUTPATIENT
Start: 2020-01-14 | End: 2020-01-16

## 2020-01-14 RX ORDER — PREDNISONE 20 MG/1
40 TABLET ORAL DAILY
Status: DISCONTINUED | OUTPATIENT
Start: 2020-01-16 | End: 2020-01-19 | Stop reason: HOSPADM

## 2020-01-14 RX ORDER — METHYLPREDNISOLONE SODIUM SUCCINATE 40 MG/ML
40 INJECTION, POWDER, LYOPHILIZED, FOR SOLUTION INTRAMUSCULAR; INTRAVENOUS EVERY 6 HOURS
Status: COMPLETED | OUTPATIENT
Start: 2020-01-14 | End: 2020-01-16

## 2020-01-14 RX ORDER — SODIUM CHLORIDE 0.9 % (FLUSH) 0.9 %
10 SYRINGE (ML) INJECTION EVERY 12 HOURS SCHEDULED
Status: DISCONTINUED | OUTPATIENT
Start: 2020-01-14 | End: 2020-01-19 | Stop reason: HOSPADM

## 2020-01-14 RX ADMIN — IPRATROPIUM BROMIDE AND ALBUTEROL SULFATE 1 AMPULE: .5; 3 SOLUTION RESPIRATORY (INHALATION) at 16:39

## 2020-01-14 RX ADMIN — Medication 10 ML: at 21:07

## 2020-01-14 RX ADMIN — DEXAMETHASONE SODIUM PHOSPHATE 4 MG: 4 INJECTION, SOLUTION INTRAMUSCULAR; INTRAVENOUS at 03:36

## 2020-01-14 RX ADMIN — ACETAMINOPHEN 650 MG: 325 TABLET, FILM COATED ORAL at 17:07

## 2020-01-14 RX ADMIN — METOPROLOL SUCCINATE 25 MG: 25 TABLET, FILM COATED, EXTENDED RELEASE ORAL at 09:15

## 2020-01-14 RX ADMIN — Medication 2 PUFF: at 19:31

## 2020-01-14 RX ADMIN — Medication 10 ML: at 09:23

## 2020-01-14 RX ADMIN — NITROGLYCERIN 1 INCH: 20 OINTMENT TOPICAL at 04:26

## 2020-01-14 RX ADMIN — SODIUM CHLORIDE: 9 INJECTION, SOLUTION INTRAVENOUS at 12:46

## 2020-01-14 RX ADMIN — METHYLPREDNISOLONE SODIUM SUCCINATE 40 MG: 40 INJECTION, POWDER, FOR SOLUTION INTRAMUSCULAR; INTRAVENOUS at 09:18

## 2020-01-14 RX ADMIN — METHYLPREDNISOLONE SODIUM SUCCINATE 40 MG: 40 INJECTION, POWDER, FOR SOLUTION INTRAMUSCULAR; INTRAVENOUS at 17:08

## 2020-01-14 RX ADMIN — MAGNESIUM SULFATE HEPTAHYDRATE 2 G: 40 INJECTION, SOLUTION INTRAVENOUS at 03:36

## 2020-01-14 RX ADMIN — IPRATROPIUM BROMIDE AND ALBUTEROL SULFATE 3 AMPULE: .5; 3 SOLUTION RESPIRATORY (INHALATION) at 03:53

## 2020-01-14 RX ADMIN — HEPARIN SODIUM 5000 UNITS: 5000 INJECTION INTRAVENOUS; SUBCUTANEOUS at 21:08

## 2020-01-14 RX ADMIN — IPRATROPIUM BROMIDE AND ALBUTEROL SULFATE 1 AMPULE: .5; 3 SOLUTION RESPIRATORY (INHALATION) at 08:10

## 2020-01-14 RX ADMIN — HEPARIN SODIUM 5000 UNITS: 5000 INJECTION INTRAVENOUS; SUBCUTANEOUS at 09:19

## 2020-01-14 RX ADMIN — VENLAFAXINE 75 MG: 37.5 TABLET ORAL at 21:06

## 2020-01-14 RX ADMIN — IPRATROPIUM BROMIDE AND ALBUTEROL SULFATE 1 AMPULE: .5; 3 SOLUTION RESPIRATORY (INHALATION) at 12:28

## 2020-01-14 RX ADMIN — METHYLPREDNISOLONE SODIUM SUCCINATE 40 MG: 40 INJECTION, POWDER, FOR SOLUTION INTRAMUSCULAR; INTRAVENOUS at 21:07

## 2020-01-14 RX ADMIN — SODIUM CHLORIDE 1000 ML: 9 INJECTION, SOLUTION INTRAVENOUS at 03:36

## 2020-01-14 RX ADMIN — IPRATROPIUM BROMIDE AND ALBUTEROL SULFATE 1 AMPULE: .5; 3 SOLUTION RESPIRATORY (INHALATION) at 19:31

## 2020-01-14 ASSESSMENT — PAIN DESCRIPTION - PAIN TYPE: TYPE: ACUTE PAIN

## 2020-01-14 ASSESSMENT — PAIN - FUNCTIONAL ASSESSMENT: PAIN_FUNCTIONAL_ASSESSMENT: ACTIVITIES ARE NOT PREVENTED

## 2020-01-14 ASSESSMENT — PAIN SCALES - GENERAL
PAINLEVEL_OUTOF10: 3
PAINLEVEL_OUTOF10: 0

## 2020-01-14 ASSESSMENT — PAIN DESCRIPTION - DESCRIPTORS: DESCRIPTORS: HEADACHE

## 2020-01-14 ASSESSMENT — PAIN DESCRIPTION - LOCATION: LOCATION: HEAD

## 2020-01-14 ASSESSMENT — PAIN DESCRIPTION - FREQUENCY: FREQUENCY: CONTINUOUS

## 2020-01-14 ASSESSMENT — PAIN DESCRIPTION - ONSET: ONSET: ON-GOING

## 2020-01-14 ASSESSMENT — PULMONARY FUNCTION TESTS
PEFR_L/MIN: 200
PEFR_L/MIN: 210

## 2020-01-14 ASSESSMENT — PAIN DESCRIPTION - PROGRESSION: CLINICAL_PROGRESSION: NOT CHANGED

## 2020-01-14 NOTE — ED TRIAGE NOTES
Pt to er with c/o sob. Pt state she woke up to use the restroom and became sob. Pt has audible wheezes.

## 2020-01-14 NOTE — CONSULTS
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 - Nephrotoxic medications have been discontinued. - Dose adjusted and appropriate. - Dose meds for eGFR <!5 mL/min/1.73m2 during SANAM    - Avoid heavy opioids due to renal failure - may use very low dose dilaudid / fentanyl with close monitoring of CNS and respiratory depression. Please refer to the orders. High Complexity. Multiple complex problems. Discussed with patient,  and treatment team- her nurse and hospitalist Dr Pelletier Blocker ill, at risk of impending organ failure needing higher level of care/monitoring. Time spent 32 minutes that included face-to-face meeting/discussion with patient, and treatment team (including primary/referring team and other consultants; included coordination of care with the treatment team; and review of patient's electronic medical records and ordering appropriates tests. Thank you for allowing me to participate in this patient's care. Please do not hesitate to contact me with any questions/concerns. We will follow along with you.      Rosalio Gayle MD       [facility] [address] [place] [address]  Office: [phone] or [address] [employer]  Fax: [phone]          CHIEF COMPLAINT:   Chief Complaint   Patient presents with    Shortness of Breath     History Obtained From:  patient, electronic medical record    HPI: Ms. Lore Berg is a 66 y.o. female with significant past medical history of   Past Medical History:   Diagnosis Date    Arthritis     Asthma     [facility]     Cancer ([facility]     bladder    COPD (chronic obstructive pulmonary disease) ([facility]     Depression     controlled w/meds    Diabetes mellitus (HCC)     diet control    Hypertension     Neuropathy     toes    Obesity, unspecified     Osteoarthrosis, unspecified whether generalized or localized, lower leg     [facility]     Tobacco use disorder     Unspecified cataract     ,   presents with Shortness of Breath  worsening -- so came to ER;''  found to have COPD exacerbation, needing duonebs, IV steroids,   Now improving SOB. Found to have SANAM so we are called. Regarding: SANAM   · Duration (when): acute   · Location (where): kidneys  · Severity: BL CKD (no previous renal out pt f/up reported) --> Scr ~ 1.4-1.5 --> to 1.8 in 2019 ---> 2.4 on admission  · Timing (ex: continuous, intermittent): continous  · Associated signs & symptoms (ex: edema, SOB): As mentioned above in CC and HPI      Below mentioned Past medical / Surgical, Social, Family medical history reviewed by me.    PAST MEDICAL HISTORY:   Past Medical History:   Diagnosis Date    Arthritis     Asthma     [facility]     [facility]     bladder    COPD (chronic obstructive pulmonary disease) [facility]     Depression     controlled w/meds    Diabetes mellitus ([facility]     diet control    Hypertension     Neuropathy     toes    Obesity, unspecified     Osteoarthrosis, unspecified whether generalized or localized, lower leg     PVD (peripheral vascular disease) ([facility]     Tobacco use disorder     Unspecified cataract      PAST SURGICAL HISTORY:   Past Surgical History:   Procedure Laterality Date    ABDOMEN SURGERY      BLADDER REMOVAL      partial    CHOLECYSTECTOMY      COLONOSCOPY      CYSTOSCOPY  4-7-10    fulgeration of bladder tumor    CYSTOSCOPY  7/14/10    Cytology    CYSTOSCOPY  12/15/10    CYSTOSCOPY  3/2/11    COLLAGEN INJECTION    CYSTOSCOPY  6-    cytology    CYSTOSCOPY  10/19/11    CYSTOSCOPY  4/11/12    CYSTOSCOPY  10/8/12    cytology    CYSTOSCOPY  4/7/14    cysto/cytology    CYSTOSCOPY  04/29/2015 Left Ear: External ear normal.      Nose: Nose normal.      Mouth/Throat:      Mouth: Mucous membranes are not dry. Eyes:      General: No scleral icterus. Conjunctiva/sclera: Conjunctivae normal.   Neck:      Musculoskeletal: Normal range of motion and neck supple. Vascular: No JVD. Cardiovascular:      Rate and Rhythm: Normal rate and regular rhythm. Heart sounds: S1 normal and S2 normal.   Pulmonary:      Effort: Respiratory distress (mild) present. Breath sounds: Wheezing present. Abdominal:      General: Bowel sounds are normal.      Palpations: Abdomen is soft. Musculoskeletal:         General: No swelling or deformity. Skin:     General: Skin is warm and dry. Neurological:      Mental Status: She is alert and oriented to person, place, and time. Mental status is at baseline. Psychiatric:         Mood and Affect: Mood normal.         Behavior: Behavior normal.              DATA:  Diagnostic tests reviewed by me for today's visit:    Recent Labs     01/14/20  0322   WBC 6.6   HCT 36.9        Iron Saturation:  No components found for: PERCENTFE  FERRITIN:  No results found for: FERRITIN  IRON:  No results found for: IRON  TIBC:  No results found for: TIBC    Recent Labs     01/14/20  0322      K 4.4      CO2 19*   BUN 48*   CREATININE 2.4*     Recent Labs     01/14/20 0322   CALCIUM 8.9     No results for input(s): PH, PCO2, PO2 in the last 72 hours.     Invalid input(s): Fack[place] Yanique    ABG:  No results found for: PH, PCO2, PO2, HCO3, BE, THGB, TCO2, O2SAT  VBG:  No results found for: PHVEN, KAV2PBF, BEVEN, [id number]    LDH:  No results found for: LDH  Uric Acid:  No results found for: LABURIC, URICACID    PT/INR:    Lab Results   Component Value Date    PROTIME 11.5 11/01/2019    INR 1.01 11/01/2019     Warfarin PT/INR:  No components found for: PTPATWAR, PTINRWAR  PTT:    Lab Results   Component Value Date    APTT 29.1 11/01/2019   [APTT}  Last 3 1. Enlarged cardiac silhouette. 2. Otherwise, no convincing acute cardiopulmonary abnormality. 3. Hyperinflation of the lungs bilaterally, which can be seen with COPD.

## 2020-01-14 NOTE — ED PROVIDER NOTES
Triage Chief Complaint:   Shortness of Breath    [place]:  Betsy Bee is a 66 y.o. female that presents having awoken from sleep with shortness of breath. Patient states that it is not abnormal for her to wake from sleep with shortness of breath however this evening was abnormal because it was persistent and associated with chest heaviness briefly. Patient received breathing treatment by squad and on arrival she states she is beginning to feel better. She does not wear home oxygen. No cardiac history in the past.  Known asthma. No recent fevers chills coryza or flulike symptoms. No admission for COPD or prednisone in the last 6 to 12 months.   Patient continues to smoke    ROS:  General:  No fevers, no chills, no weakness  Eyes:  No recent vison changes, no discharge  ENT:  No sore throat, no nasal congestion, no hearing changes  Cardiovascular: Above  Respiratory: As above  Gastrointestinal:  No pain, no nausea, no vomiting, no diarrhea  Musculoskeletal:  No muscle pain, no joint pain  Skin:  No rash, no pruritis, no easy bruising  Neurologic:  No speech problems, no headache, no extremity numbness, no extremity tingling, no extremity weakness  Psychiatric:  No anxiety, no hallucinations or delusions, no suicidal or homicidal ideation  Genitourinary:  No dysuria, no hematuria  Endocrine:  No unexpected weight gain, no unexpected weight loss  Extremities:  no edema, no pain    Past Medical History:   Diagnosis Date    Arthritis     Asthma     [facility]     Cancer ([facility]     bladder    COPD (chronic obstructive pulmonary disease) [facility]     Depression     controlled w/meds    Diabetes mellitus (HCC)     diet control    Hypertension     Neuropathy     toes    Obesity, unspecified     Osteoarthrosis, unspecified whether generalized or localized, lower leg     PVD [facility]peripheral vascular disease) [facility]     Tobacco use disorder     Unspecified cataract      Past Surgical History: file   Relationships    Social connections:     Talks on phone: Not on file     Gets together: Not on file     Attends Spiritism service: Not on file     Active member of club or organization: Not on file     Attends meetings of clubs or organizations: Not on file     Relationship status: Not on file    Intimate partner violence:     Fear of current or ex partner: Not on file     Emotionally abused: Not on file     Physically abused: Not on file     Forced sexual activity: Not on file   Other Topics Concern    Not on file   Social History Narrative    Not on file     No current facility-administered medications for this encounter. Current Outpatient Medications   Medication Sig Dispense Refill    albuterol sulfate HFA (PROVENTIL HFA) 108 (90 Base) MCG/ACT inhaler Inhale 2 puffs into the lungs every 4 hours as needed for Wheezing or Shortness of Breath (Space out to every 6 hours as symptoms improve) Space out to every 6 hours as symptoms improve. 1 Inhaler 0    albuterol (PROVENTIL) (5 MG/ML) 0.5% nebulizer solution Take 0.5 mLs by nebulization every 6 hours as needed for Wheezing 30 vial 0    albuterol sulfate  (90 Base) MCG/ACT inhaler Inhale 2 puffs into the lungs every 6 hours as needed for Wheezing At patient's preference may use 60 dose MDI. 1 Inhaler 1    venlafaxine (EFFEXOR) 75 MG tablet Take 1 tablet by mouth 2 times daily 60 tablet 0    nicotine (NICODERM CQ) 14 MG/24HR Place 1 patch onto the skin daily 30 patch 3    mometasone-formoterol (DULERA) 200-5 MCG/ACT inhaler Inhale 2 puffs into the lungs every 12 hours Note to pharmacist... May substitute for comparable drug if this is not on formulary. ..  Call if questions  [phone] 1 Inhaler 3    Cyanocobalamin (VITAMIN B-12) 1000 MCG extended release tablet Take 1 tablet by mouth daily 90 tablet 3    mometasone-formoterol (DULERA) 200-5 MCG/ACT inhaler Inhale 2 puffs into the lungs every 12 hours 1 Inhaler 3    Ketotifen Fumarate (ITCHY EYE DROPS OP) Apply to eye 2 times daily Indications: Dry Eyes caused by Deficiency of Tears      metoprolol succinate (TOPROL XL) 25 MG extended release tablet TAKE ONE TABLET BY MOUTH ONCE DAILY 90 tablet 3    acetaminophen (TYLENOL) 500 MG tablet Take 500 mg by mouth every 6 hours as needed for Pain. Allergies   Allergen Reactions    Aspirin Shortness Of Breath    Ibuprofen Shortness Of Breath    Adhesive Tape Swelling     Only if it stays on a long time    Hydrochlorothiazide Other (See Comments)     Palpitations.  Microderm Base [Albolene]     Nsaids Other (See Comments)     Respiratory failure.  Voltaren [Diclofenac Sodium]      Wheezing, swelling.  Procardia [Nifedipine] Swelling and Rash       Nursing Notes Reviewed    Physical Exam:  ED Triage Vitals   Enc Vitals Group      BP 01/14/20 0306 (!) 178/61      Pulse 01/14/20 0309 81      Resp 01/14/20 0309 12      Temp 01/14/20 0330 98.4 °F (36.9 °C)      Temp Source 01/14/20 0330 Oral      SpO2 01/14/20 0306 98 %      Weight --       Height --       Head Circumference --       Peak Flow --       Pain Score --       Pain Loc --       Pain Edu? --       Excl. in [address]? --        My pulse ox interpretation is - normal    General appearance:  No acute distress. Audible wheezing is present. Pleasant. Skin:  Warm. Dry. No petechiae or purpura. Eye:  Extraocular movements intact. PERRLA  Ears, nose, mouth and throat:  Oral mucosa moist, no trismus. Oropharynx with no exudate or erythema. Neck:  Trachea midline. Supple. No cervical lymphadenopathy  Extremity:  No swelling. Normal ROM. No calf pain or asymmetric swelling. No lower extremity edema  Heart:  Regular rate and rhythm, normal S1 & S2, no extra heart sounds. Perfusion:  Intact, capillary refill less than 2 seconds  Respiratory: Inspiratory and expiratory wheezing initially. Pulse ox 96% on room air. Speaking in 3-4 word sentences.   Mildly tachypneic initially  Abdominal:  Normal bowel sounds. Soft. No peritoneal signs. No hepatosplenomegaly. Back:  No CVA tenderness to palpation. No bruising. No CTL tenderness to palpation or step-off  Neurological:  Alert and oriented times 3. No focal neuro deficits. Cranial nerves II through XII are grossly intact.           Psychiatric:  Appropriate    I have reviewed and interpreted all of the currently available lab results from this visit (if applicable):  Results for orders placed or performed during the hospital encounter of 01/14/20   Rapid influenza A/B antigens   Result Value Ref Range    Rapid Influenza A Ag Negative Negative    Rapid Influenza B Ag Negative Negative   CBC   Result Value Ref Range    WBC 6.6 4.0 - 11.0 K/uL    RBC 3.85 (L) 4.00 - 5.20 M/uL    Hemoglobin 12.1 12.0 - 16.0 g/dL    Hematocrit 36.9 36.0 - 48.0 %    MCV 95.8 80.0 - 100.0 fL    MCH 31.4 26.0 - 34.0 pg    MCHC 32.8 31.0 - 36.0 g/dL    RDW 13.6 12.4 - 15.4 %    Platelets 012 609 - 800 K/uL    MPV 10.4 5.0 - 10.5 fL   Troponin   Result Value Ref Range    Troponin 0.03 (H) <0.01 ng/mL   Comprehensive Metabolic Panel   Result Value Ref Range    Sodium 139 136 - 145 mmol/L    Potassium 4.4 3.5 - 5.1 mmol/L    Chloride 107 99 - 110 mmol/L    CO2 19 (L) 21 - 32 mmol/L    Anion Gap 13 3 - 16    Glucose 108 (H) 70 - 99 mg/dL    BUN 48 (H) 7 - 20 mg/dL    CREATININE 2.4 (H) 0.6 - 1.2 mg/dL    GFR Non-African American 20 (A) >60    GFR  24 (A) >60    Calcium 8.9 8.3 - 10.6 mg/dL    Total Protein 7.8 6.4 - 8.2 g/dL    Alb 3.6 3.4 - 5.0 g/dL    Albumin/Globulin Ratio 0.9 (L) 1.1 - 2.2    Total Bilirubin <0.2 0.0 - 1.0 mg/dL    Alkaline Phosphatase 139 (H) 40 - 129 U/L    ALT 8 (L) 10 - 40 U/L    AST 15 15 - 37 U/L    Globulin 4.2 g/dL   EKG 12 Lead   Result Value Ref Range    Ventricular Rate 66 BPM    Atrial Rate 66 BPM    P-R Interval 200 ms    QRS Duration 94 ms    Q-T Interval 418 ms    QTc Calculation (Bazett) 438 ms    P Axis 67 degrees    R Axis 27 degrees    T Axis 68 degrees    Diagnosis Normal sinus rhythmNormal ECG       Radiographs (if obtained):  [] The following radiograph was interpreted by myself in the absence of a radiologist:   [] Radiologist's Report Reviewed:  XR CHEST STANDARD (2 VW)   Final Result   1. Enlarged cardiac silhouette. 2. Otherwise, no convincing acute cardiopulmonary abnormality. 3. Hyperinflation of the lungs bilaterally, which can be seen with COPD. EKG (if obtained): (All EKG's are interpreted by myself in the absence of a cardiologist) EKG is normal.  Normal sinus rhythm. Rate 66. Normal axis. . QRS 94. QTc 438. Normal R wave progression. No flipped T waves. No Q waves. No acute ST elevation or depression. No delta wave. No Brugada. Normal EKG. Chart review shows recent radiographs:  Xr Chest Standard (2 Vw)    Result Date: 1/14/2020  EXAMINATION: TWO XRAY VIEWS OF THE CHEST 1/14/2020 3:23 am COMPARISON: 11/01/2019. HISTORY: ORDERING SYSTEM PROVIDED HISTORY: ? PNA TECHNOLOGIST PROVIDED HISTORY: Reason for exam:->? PNA Initial evaluation. FINDINGS: The cardiac silhouette appears enlarged. No focal consolidation. Hyperinflation of the lungs bilaterally. There is no pleural effusion or pneumothorax. The osseous structures are markedly osteopenic, which limits evaluation. 1. Enlarged cardiac silhouette. 2. Otherwise, no convincing acute cardiopulmonary abnormality. 3. Hyperinflation of the lungs bilaterally, which can be seen with COPD. MDM:  75-year-old female who presents with a COPD exacerbation based on history and clinical symptoms with no evidence of sepsis or acute respiratory failure but significant wheezing. Symptomatic treatment with fluids, Decadron, magnesium, duo nebs x3 and on repeat evaluation patient feeling significantly better and wheezing is nearly resolved. Troponin came back elevated 0.03.   On repeat evaluation patient denies

## 2020-01-14 NOTE — H&P
[facility]    1/14/2020 9:44 AM    Patient Information:  Pelon Garcia is a 66 y.o. female [id number]  PCP:  No primary care provider on file. (Tel: None )    Chief complaint:    Chief Complaint   Patient presents with    Shortness of Breath        History of Present Illness:     Josefa Nicole is a 66 y.o. female with known COPD who presents with wosening sob. Onset about few days ago that has progressively got worse to a point where pt gets dyspneinc. Pt tried home inhaler but was not helping. Note some worsening cough. No fevers. . Current symptoms include: acute dyspnea, non-productive cough and wheezing. The course of his symptoms over time is acute is worsening . No n/v/d/ no sick contact that pt can think of. . She continues to smokke   In ER pt was jyoti paris, iv steroid and put on oxygen which helped. Pt states noted some improvement in symptoms. symptoms improved with rest and some inhaler and worsened with weather changes, temperature. Exertion. Denies any chest pain. REVIEW OF SYSTEMS:   Constitutional: Negative for fever,chills or night sweats  ENT: Negative for rhinorrhea, epistaxis, hoarseness, sore throat. Respiratory: positive for  for shortness of breath,wheezing  Cardiovascular: Negative for chest pain, palpitations   Gastrointestinal: Negative for nausea, vomiting, diarrhea  Genitourinary: Negative for polyuria, dysuria   Hematologic/Lymphatic: Negative for bleeding tendency, easy bruising  Musculoskeletal: Negative for myalgias and arthralgias  Neurologic: Negative for confusion,dysarthria. Skin: Negative for itching,rash  Psychiatric: Negative for depression,anxiety, agitation. Endocrine: Negative for polydipsia,polyuria,heat /cold intolerance.     Past Medical History:   has a past medical history of Arthritis, Asthma, Bipolar 1 disorder ([facility] [address] Cancer ([facility] [address] COPD (chronic comparable drug if this is not on formulary. .. Call if questions  [phone] 1 Inhaler 3    Cyanocobalamin (VITAMIN B-12) 1000 MCG extended release tablet Take 1 tablet by mouth daily 90 tablet 3    mometasone-formoterol (DULERA) 200-5 MCG/ACT inhaler Inhale 2 puffs into the lungs every 12 hours 1 Inhaler 3    Ketotifen Fumarate (ITCHY EYE DROPS OP) Apply to eye 2 times daily Indications: Dry Eyes caused by Deficiency of Tears      metoprolol succinate (TOPROL XL) 25 MG extended release tablet TAKE ONE TABLET BY MOUTH ONCE DAILY 90 tablet 3    acetaminophen (TYLENOL) 500 MG tablet Take 500 mg by mouth every 6 hours as needed for Pain. Allergies: Allergies   Allergen Reactions    Aspirin Shortness Of Breath    Ibuprofen Shortness Of Breath    Adhesive Tape Swelling     Only if it stays on a long time    Hydrochlorothiazide Other (See Comments)     Palpitations.  Microderm Base [Albolene]     Nsaids Other (See Comments)     Respiratory failure.  Voltaren [Diclofenac Sodium]      Wheezing, swelling.  Procardia [Nifedipine] Swelling and Rash        Social History:   reports that she has been smoking cigarettes. She has a 27.50 pack-year smoking history. She has never used smokeless tobacco. She reports current alcohol use. She reports that she does not use drugs. Family History:  family history includes Anesth Problems in her mother; Heart Disease in her brother, father, and mother; Stroke in her brother. ,     Physical Exam:  BP (!) 117/41   Pulse 86   Temp 98.4 °F (36.9 °C) (Oral)   Resp 18   SpO2 98%     General appearance:  Appears comfortable. Well nourished  Eyes: Sclera clear, pupils equal  ENT: Moist mucus membranes, no thrush. Trachea midline. Cardiovascular: Regular rhythm, normal S1, S2. No murmur, gallop, rub. No edema in lower extremities  Respiratory: diffuse wheezing with mild distress.  No acessory muscles note  Gastrointestinal: Abdomen soft, non-tender, not

## 2020-01-15 ENCOUNTER — APPOINTMENT (OUTPATIENT)
Dept: CT IMAGING | Age: 75
DRG: 191 | End: 2020-01-15
Payer: MEDICARE

## 2020-01-15 LAB
ANION GAP SERPL CALCULATED.3IONS-SCNC: 15 MMOL/L (ref 3–16)
BUN BLDV-MCNC: 51 MG/DL (ref 7–20)
CALCIUM SERPL-MCNC: 8.8 MG/DL (ref 8.3–10.6)
CHLORIDE BLD-SCNC: 108 MMOL/L (ref 99–110)
CHLORIDE URINE RANDOM: 48 MMOL/L
CO2: 16 MMOL/L (ref 21–32)
CREAT SERPL-MCNC: 2.3 MG/DL (ref 0.6–1.2)
CREATININE URINE: 97.5 MG/DL (ref 28–259)
GFR AFRICAN AMERICAN: 25
GFR NON-AFRICAN AMERICAN: 21
GLUCOSE BLD-MCNC: 141 MG/DL (ref 70–99)
HCT VFR BLD CALC: 33.8 % (ref 36–48)
HEMOGLOBIN: 10.9 G/DL (ref 12–16)
L. PNEUMOPHILA SEROGP 1 UR AG: NORMAL
MCH RBC QN AUTO: 31.1 PG (ref 26–34)
MCHC RBC AUTO-ENTMCNC: 32.2 G/DL (ref 31–36)
MCV RBC AUTO: 96.7 FL (ref 80–100)
PDW BLD-RTO: 13.8 % (ref 12.4–15.4)
PLATELET # BLD: 190 K/UL (ref 135–450)
PMV BLD AUTO: 10.8 FL (ref 5–10.5)
POTASSIUM REFLEX MAGNESIUM: 4 MMOL/L (ref 3.5–5.1)
POTASSIUM, UR: 24.5 MMOL/L
RBC # BLD: 3.49 M/UL (ref 4–5.2)
SODIUM BLD-SCNC: 139 MMOL/L (ref 136–145)
SODIUM URINE: 61 MMOL/L
UREA NITROGEN, UR: 750.5 MG/DL (ref 800–1666)
WBC # BLD: 12.4 K/UL (ref 4–11)

## 2020-01-15 PROCEDURE — 74176 CT ABD & PELVIS W/O CONTRAST: CPT

## 2020-01-15 PROCEDURE — 6370000000 HC RX 637 (ALT 250 FOR IP): Performed by: INTERNAL MEDICINE

## 2020-01-15 PROCEDURE — 80048 BASIC METABOLIC PNL TOTAL CA: CPT

## 2020-01-15 PROCEDURE — 6370000000 HC RX 637 (ALT 250 FOR IP): Performed by: PHYSICIAN ASSISTANT

## 2020-01-15 PROCEDURE — 6360000002 HC RX W HCPCS: Performed by: HOSPITALIST

## 2020-01-15 PROCEDURE — 2580000003 HC RX 258: Performed by: INTERNAL MEDICINE

## 2020-01-15 PROCEDURE — 94640 AIRWAY INHALATION TREATMENT: CPT

## 2020-01-15 PROCEDURE — 1200000000 HC SEMI PRIVATE

## 2020-01-15 PROCEDURE — 85027 COMPLETE CBC AUTOMATED: CPT

## 2020-01-15 PROCEDURE — 51798 US URINE CAPACITY MEASURE: CPT

## 2020-01-15 PROCEDURE — 36415 COLL VENOUS BLD VENIPUNCTURE: CPT

## 2020-01-15 PROCEDURE — 6360000002 HC RX W HCPCS: Performed by: INTERNAL MEDICINE

## 2020-01-15 PROCEDURE — 94761 N-INVAS EAR/PLS OXIMETRY MLT: CPT

## 2020-01-15 RX ADMIN — HEPARIN SODIUM 5000 UNITS: 5000 INJECTION INTRAVENOUS; SUBCUTANEOUS at 13:38

## 2020-01-15 RX ADMIN — HEPARIN SODIUM 5000 UNITS: 5000 INJECTION INTRAVENOUS; SUBCUTANEOUS at 05:30

## 2020-01-15 RX ADMIN — IPRATROPIUM BROMIDE AND ALBUTEROL SULFATE 1 AMPULE: .5; 3 SOLUTION RESPIRATORY (INHALATION) at 07:35

## 2020-01-15 RX ADMIN — HEPARIN SODIUM 5000 UNITS: 5000 INJECTION INTRAVENOUS; SUBCUTANEOUS at 21:27

## 2020-01-15 RX ADMIN — Medication 10 ML: at 19:49

## 2020-01-15 RX ADMIN — IPRATROPIUM BROMIDE AND ALBUTEROL SULFATE 1 AMPULE: .5; 3 SOLUTION RESPIRATORY (INHALATION) at 13:10

## 2020-01-15 RX ADMIN — METHYLPREDNISOLONE SODIUM SUCCINATE 40 MG: 40 INJECTION, POWDER, FOR SOLUTION INTRAMUSCULAR; INTRAVENOUS at 01:57

## 2020-01-15 RX ADMIN — METHYLPREDNISOLONE SODIUM SUCCINATE 40 MG: 40 INJECTION, POWDER, FOR SOLUTION INTRAMUSCULAR; INTRAVENOUS at 09:12

## 2020-01-15 RX ADMIN — VENLAFAXINE 75 MG: 37.5 TABLET ORAL at 09:12

## 2020-01-15 RX ADMIN — METOPROLOL SUCCINATE 25 MG: 25 TABLET, FILM COATED, EXTENDED RELEASE ORAL at 09:12

## 2020-01-15 RX ADMIN — SODIUM CHLORIDE: 9 INJECTION, SOLUTION INTRAVENOUS at 09:13

## 2020-01-15 RX ADMIN — METHYLPREDNISOLONE SODIUM SUCCINATE 40 MG: 40 INJECTION, POWDER, FOR SOLUTION INTRAMUSCULAR; INTRAVENOUS at 19:49

## 2020-01-15 RX ADMIN — METHYLPREDNISOLONE SODIUM SUCCINATE 40 MG: 40 INJECTION, POWDER, FOR SOLUTION INTRAMUSCULAR; INTRAVENOUS at 13:37

## 2020-01-15 RX ADMIN — VENLAFAXINE 75 MG: 37.5 TABLET ORAL at 19:51

## 2020-01-15 RX ADMIN — Medication 10 ML: at 09:12

## 2020-01-15 RX ADMIN — Medication 2 PUFF: at 07:35

## 2020-01-15 RX ADMIN — IPRATROPIUM BROMIDE AND ALBUTEROL SULFATE 1 AMPULE: .5; 3 SOLUTION RESPIRATORY (INHALATION) at 16:55

## 2020-01-15 ASSESSMENT — PAIN SCALES - GENERAL
PAINLEVEL_OUTOF10: 0

## 2020-01-15 ASSESSMENT — PULMONARY FUNCTION TESTS
PEFR_L/MIN: 200
PEFR_L/MIN: 210

## 2020-01-15 NOTE — CONSULTS
Urology Consult Note  [facility]     Patient: Lizbeth Larsen MRN: [id number]  Room/Bed: 9JC-0257/3291-46   YOB: 1945  Age/Sex: 66 y. o.female  Admission Date: 1/14/2020     Date of Service:  1/15/2020    Consulting Physician: Akiko Lai  Admitting/Requesting Physician: Eliud Gamez MD  Primary Care Physician: No primary care provider on file. Reason for Consult: RIGHT hydronephrosis, prior RIGHT ureteral stricture, reimplant solitary kidney s/p LEFT nephrectomy for UTUC    ASSESSMENT/PLAN     65 yo F patient. + tobacco use. Hx gastric bypass, john. allergic ASA and ibuprofen. possible COPD. no cardiac history. Complex history with prior ? open bladder cancer excision, RIGHT ureteral re-reimplant -- remote unable to see op note  - chronic RIGHT hydronephrosis, with possible recurrent distal stricture, managed with chronic stent -- ? Last placed 2017 per outpatient records, lost to followup  - last CT 8/2019 with stent in place  - YUMIKO here with RIGHT hydro, unable to clearly visualize stent  - pt poor historian, no flank pain, admitted with SANAM on CKD 3  LEFT renal mass: s/p robo nephroureterectomy 10/24/2016 for pTa HG TCC -- Dr. Berta Lanier    Recommendations:  CT A/P to evaluate stent position/encrustation -- ordered  Will need cysto, R RPG, stent exchange -- overdue  Likely manage chronic stricture with chronic / possible metal stent  Needs ca surveillance as well -- prior CT neg for recurrence, overdue for cystoscopy  NPO and midnight -- pt ate earlier unable to do today    All the patients questions were answered. She understands the plan as listed above. HISTORY     Chief Complaint:   Chief Complaint   Patient presents with    Shortness of Breath       History of Present Illness: Lizbeth Larsen is a 66 y.o. female with RIGHT hydro. Onset of symptoms was none with no flank pain course since that time. Symptoms are aggravated by nothing.  Symptoms improved with nothing. Associated symptoms include none. Complex uro hx    Past Medical History:  She has a past medical history of Arthritis, Asthma, Bipolar 1 disorder ([facility] [address] Cancer ([facility] [address] COPD (chronic obstructive pulmonary disease) ([facility] [address] Depression, Diabetes mellitus ([facility] [address] Hypertension, Neuropathy, Obesity, unspecified, Osteoarthrosis, unspecified whether generalized or localized, lower leg, PVD (peripheral vascular disease) ([facility] [address] Tobacco use disorder, and Unspecified cataract. Past Surgical History:  She has a past surgical history that includes Cholecystectomy; Gastric bypass surgery; Bladder removal; joint replacement; eye surgery; Cystoscopy (4-7-10); Cystoscopy (7/14/10); Cystoscopy (12/15/10); Cystoscopy (3/2/11); Cystoscopy (6-); Cystoscopy (10/19/11); Cystoscopy (4/11/12); Cystocopy (10/8/12); Cystocopy (4/7/14); Total knee arthroplasty (Left); Femur fracture surgery (Left, [id number]); Cystoscopy (04/29/2015); Colonoscopy; Abdomen surgery; other surgical history (Left, 10/24/2016); and other surgical history (03/22/2017). Allergies: Allergies   Allergen Reactions    Aspirin Shortness Of Breath    Ibuprofen Shortness Of Breath    Adhesive Tape Swelling     Only if it stays on a long time    Hydrochlorothiazide Other (See Comments)     Palpitations.  Microderm Base [Albolene]     Nsaids Other (See Comments)     Respiratory failure.  Voltaren [Diclofenac Sodium]      Wheezing, swelling.  Procardia [Nifedipine] Swelling and Rash        Social History:  She reports that she has been smoking cigarettes. She has a 55.00 pack-year smoking history. She has never used smokeless tobacco. She reports previous alcohol use. She reports that she does not use drugs. Family History:  family history includes Anesth Problems in her mother; Heart Disease in her brother, father, and mother; Stroke in her brother.     Medications:  Scheduled Meds:   metoprolol succinate  25 mg Oral Daily    is mostly collapsed. Ureteral jets were not visualized. Moderate to severe right hydronephrosis. Status post left nephrectomy.             Electronically signed by: Napoleon Muñiz, 1/15/2020   [facility]  Office contact: [phone]

## 2020-01-15 NOTE — PROGRESS NOTES
daily  acetaminophen (TYLENOL) 500 MG tablet, Take 500 mg by mouth every 6 hours as needed for Pain. nicotine (NICODERM CQ) 14 MG/24HR, Place 1 patch onto the skin daily     Scheduled Meds:   metoprolol succinate  25 mg Oral Daily    mometasone-formoterol  2 puff Inhalation BID    venlafaxine  75 mg Oral BID    sodium chloride flush  10 mL Intravenous 2 times per day    ipratropium-albuterol  1 ampule Inhalation Q4H WA    methylPREDNISolone  40 mg Intravenous Q6H    Followed by   Rob Jean-Baptiste ON 1/16/2020] predniSONE  40 mg Oral Daily    heparin (porcine)  5,000 Units Subcutaneous 3 times per day    influenza virus vaccine  0.5 mL Intramuscular Prior to discharge    nicotine  1 patch Transdermal Daily     Continuous Infusions:   sodium chloride 50 mL/hr at 01/14/20 1246     PRN Meds:.acetaminophen, sodium chloride flush, magnesium hydroxide, ondansetron, albuterol      Allergies reviewed by me: Aspirin; Ibuprofen; Adhesive tape; Hydrochlorothiazide; Microderm base [albolene]; Nsaids; Voltaren [diclofenac sodium]; and Procardia [nifedipine]    REVIEW OF SYSTEMS:  As mentioned in chief complaint and HPI , subjective   All other 10-point review of systems: negative. PHYSICAL EXAM:  Recent vital signs and recent I/Os reviewed by me.    Patient Vitals for the past 24 hrs:   BP Temp Temp src Pulse Resp SpO2 Height Weight   01/15/20 0736 -- -- -- -- 16 95 % -- --   01/15/20 0500 (!) 164/73 98.1 °F (36.7 °C) Temporal 72 16 96 % -- 130 lb 9.6 oz (59.2 kg)   01/15/20 0100 (!) 159/66 98.4 °F (36.9 °C) Temporal 77 18 97 % -- --   01/14/20 2100 134/71 98.8 °F (37.1 °C) Temporal 87 18 96 % -- --   01/14/20 1958 135/71 97.7 °F (36.5 °C) Oral 83 18 97 % -- --   01/14/20 1933 -- -- -- -- -- 97 % -- --   01/14/20 1639 -- -- -- -- 18 98 % -- --   01/14/20 1443 (!) 141/62 97.8 °F (36.6 °C) Temporal 80 16 98 % 5' 6\" (1.676 m) 127 lb 4.8 oz (57.7 kg)   01/14/20 1400 130/68 -- -- 90 14 97 % -- --   01/14/20 1300 (!) 137/50 -- 139 139   K 4.4 4.0    108   CO2 19* 16*   BUN 48* 51*   CREATININE 2.4* 2.3*     Recent Labs     01/14/20  0322 01/15/20  0512   CALCIUM 8.9 8.8     No results for input(s): PH, PCO2, PO2 in the last 72 hours.     Invalid input(s): Jose Carlos Martinez    ABG:  No results found for: PH, PCO2, PO2, HCO3, BE, THGB, TCO2, O2SAT  VBG:  No results found for: PHVEN, QAI0HFY, BEVEN, F3AYIKGY    LDH:  No results found for: LDH  Uric Acid:  No results found for: LABURIC, URICACID    PT/INR:    Lab Results   Component Value Date    PROTIME 11.5 11/01/2019    INR 1.01 11/01/2019     Warfarin PT/INR:  No components found for: Codyos Oris  PTT:    Lab Results   Component Value Date    APTT 29.1 11/01/2019   [APTT}  Last 3 Troponin:    Lab Results   Component Value Date    TROPONINI 0.03 01/14/2020    TROPONINI <0.01 11/01/2019    TROPONINI <0.01 11/01/2019       U/A:    Lab Results   Component Value Date    COLORU YELLOW 01/14/2020    PROTEINU TRACE 01/14/2020    PHUR 5.5 01/14/2020    WBCUA 15 01/14/2020    RBCUA 5-10 01/14/2020    YEAST none 08/12/2016    BACTERIA none 08/12/2016    CLARITYU CLOUDY 01/14/2020    SPECGRAV 1.016 01/14/2020    LEUKOCYTESUR MODERATE 01/14/2020    UROBILINOGEN 0.2 01/14/2020    BILIRUBINUR Negative 01/14/2020    BLOODU MODERATE 01/14/2020    GLUCOSEU Negative 01/14/2020     Microalbumen/Creatinine ratio:  No components found for: RUCREAT  24 Hour Urine for Protein:  No components found for: RAWUPRO, UHRS3, DDDH52FP, UTV3  24 Hour Urine for Creatinine Clearance:  No components found for: CREAT4, UHRS10, UTV10  Urine Toxicology:  No components found for: Delgado Carry, IBENZO, ICOCAINE, IMARTHC, IOPIATES, IPHENCYC    HgBA1c:    Lab Results   Component Value Date    LABA1C 5.1 03/07/2018     RPR:  No results found for: RPR  HIV:  No results found for: HIV  MEHUL:    Lab Results   Component Value Date    MEHUL Negative 08/10/2011     RF:  No results found for: RF  DSDNA:  No components found for: DNA  AMYLASE:    Lab Results   Component Value Date    AMYLASE 36 02/09/2010     LIPASE:  No results found for: LIPASE  Fibrinogen Level:  No components found for: FIB       BELOW MENTIONED RADIOLOGY STUDY RESULTS BY ME:    Xr Chest Standard (2 Vw)    Result Date: 1/14/2020  EXAMINATION: TWO XRAY VIEWS OF THE CHEST 1/14/2020 3:23 am COMPARISON: 11/01/2019. HISTORY: ORDERING SYSTEM PROVIDED HISTORY: ? PNA TECHNOLOGIST PROVIDED HISTORY: Reason for exam:->? PNA Initial evaluation. FINDINGS: The cardiac silhouette appears enlarged. No focal consolidation. Hyperinflation of the lungs bilaterally. There is no pleural effusion or pneumothorax. The osseous structures are markedly osteopenic, which limits evaluation. 1. Enlarged cardiac silhouette. 2. Otherwise, no convincing acute cardiopulmonary abnormality. 3. Hyperinflation of the lungs bilaterally, which can be seen with COPD. Narrative   EXAMINATION:   RETROPERITONEAL ULTRASOUND OF THE KIDNEYS AND URINARY BLADDER       1/14/2020       COMPARISON:   10/20/2017 CT chest, April 2015 renal ultrasound       HISTORY:   ORDERING SYSTEM PROVIDED HISTORY: joe, h/o hydronephrosis, needing stent.    TECHNOLOGIST PROVIDED HISTORY:   Reason for exam:->joe, h/o hydronephrosis, needing stent.       History of left nephrectomy       FINDINGS:       Kidneys:       The right kidney measures 12.0 cm in length.  Moderate to severe right   hydronephrosis is evident.  No calcified shadowing renal pelvic stones are   identified.       The left kidney is surgically absent.           Bladder:       The urinary bladder is mostly collapsed.  Ureteral jets were not visualized.           Impression   Moderate to severe right hydronephrosis.       Status post left nephrectomy.

## 2020-01-15 NOTE — PROGRESS NOTES
need cystoscopy and possible stent exchange  -N.p.o. after midnight  -IV fluids appreciate nephrology recommendation       Diet: DIET GENERAL;  Code:Full Code  DVT PPX thiago Oneal MD   1/15/2020 1:06 PM

## 2020-01-16 ENCOUNTER — ANESTHESIA EVENT (OUTPATIENT)
Dept: OPERATING ROOM | Age: 75
DRG: 191 | End: 2020-01-16
Payer: MEDICARE

## 2020-01-16 ENCOUNTER — APPOINTMENT (OUTPATIENT)
Dept: GENERAL RADIOLOGY | Age: 75
DRG: 191 | End: 2020-01-16
Payer: MEDICARE

## 2020-01-16 ENCOUNTER — ANESTHESIA (OUTPATIENT)
Dept: OPERATING ROOM | Age: 75
DRG: 191 | End: 2020-01-16
Payer: MEDICARE

## 2020-01-16 VITALS
RESPIRATION RATE: 1 BRPM | OXYGEN SATURATION: 99 % | DIASTOLIC BLOOD PRESSURE: 49 MMHG | TEMPERATURE: 97.3 F | SYSTOLIC BLOOD PRESSURE: 95 MMHG

## 2020-01-16 LAB
ALBUMIN SERPL-MCNC: 3.2 G/DL (ref 3.4–5)
ANION GAP SERPL CALCULATED.3IONS-SCNC: 12 MMOL/L (ref 3–16)
BASOPHILS ABSOLUTE: 0 K/UL (ref 0–0.2)
BASOPHILS RELATIVE PERCENT: 0 %
BUN BLDV-MCNC: 52 MG/DL (ref 7–20)
CALCIUM SERPL-MCNC: 8.5 MG/DL (ref 8.3–10.6)
CHLORIDE BLD-SCNC: 109 MMOL/L (ref 99–110)
CO2: 17 MMOL/L (ref 21–32)
CREAT SERPL-MCNC: 2 MG/DL (ref 0.6–1.2)
EOSINOPHILS ABSOLUTE: 0 K/UL (ref 0–0.6)
EOSINOPHILS RELATIVE PERCENT: 0 %
GFR AFRICAN AMERICAN: 29
GFR NON-AFRICAN AMERICAN: 24
GLUCOSE BLD-MCNC: 103 MG/DL (ref 70–99)
GLUCOSE BLD-MCNC: 109 MG/DL (ref 70–99)
HCT VFR BLD CALC: 34.8 % (ref 36–48)
HEMOGLOBIN: 11.3 G/DL (ref 12–16)
LYMPHOCYTES ABSOLUTE: 0.5 K/UL (ref 1–5.1)
LYMPHOCYTES RELATIVE PERCENT: 4.8 %
MCH RBC QN AUTO: 31.2 PG (ref 26–34)
MCHC RBC AUTO-ENTMCNC: 32.6 G/DL (ref 31–36)
MCV RBC AUTO: 95.7 FL (ref 80–100)
MONOCYTES ABSOLUTE: 0.3 K/UL (ref 0–1.3)
MONOCYTES RELATIVE PERCENT: 2.8 %
NEUTROPHILS ABSOLUTE: 9.8 K/UL (ref 1.7–7.7)
NEUTROPHILS RELATIVE PERCENT: 92.4 %
PDW BLD-RTO: 13.7 % (ref 12.4–15.4)
PERFORMED ON: ABNORMAL
PHOSPHORUS: 4.3 MG/DL (ref 2.5–4.9)
PLATELET # BLD: 168 K/UL (ref 135–450)
PMV BLD AUTO: 10.5 FL (ref 5–10.5)
POTASSIUM SERPL-SCNC: 4.1 MMOL/L (ref 3.5–5.1)
RBC # BLD: 3.64 M/UL (ref 4–5.2)
SODIUM BLD-SCNC: 138 MMOL/L (ref 136–145)
WBC # BLD: 10.7 K/UL (ref 4–11)

## 2020-01-16 PROCEDURE — 3700000000 HC ANESTHESIA ATTENDED CARE: Performed by: UROLOGY

## 2020-01-16 PROCEDURE — 6370000000 HC RX 637 (ALT 250 FOR IP): Performed by: INTERNAL MEDICINE

## 2020-01-16 PROCEDURE — 6360000002 HC RX W HCPCS: Performed by: NURSE ANESTHETIST, CERTIFIED REGISTERED

## 2020-01-16 PROCEDURE — 3600000014 HC SURGERY LEVEL 4 ADDTL 15MIN: Performed by: UROLOGY

## 2020-01-16 PROCEDURE — 0TP98DZ REMOVAL OF INTRALUMINAL DEVICE FROM URETER, VIA NATURAL OR ARTIFICIAL OPENING ENDOSCOPIC: ICD-10-PCS | Performed by: UROLOGY

## 2020-01-16 PROCEDURE — 36415 COLL VENOUS BLD VENIPUNCTURE: CPT

## 2020-01-16 PROCEDURE — 2580000003 HC RX 258: Performed by: INTERNAL MEDICINE

## 2020-01-16 PROCEDURE — C1758 CATHETER, URETERAL: HCPCS | Performed by: UROLOGY

## 2020-01-16 PROCEDURE — 6370000000 HC RX 637 (ALT 250 FOR IP): Performed by: UROLOGY

## 2020-01-16 PROCEDURE — 6360000004 HC RX CONTRAST MEDICATION: Performed by: UROLOGY

## 2020-01-16 PROCEDURE — 80069 RENAL FUNCTION PANEL: CPT

## 2020-01-16 PROCEDURE — 51798 US URINE CAPACITY MEASURE: CPT

## 2020-01-16 PROCEDURE — 3700000001 HC ADD 15 MINUTES (ANESTHESIA): Performed by: UROLOGY

## 2020-01-16 PROCEDURE — BT1D1ZZ FLUOROSCOPY OF RIGHT KIDNEY, URETER AND BLADDER USING LOW OSMOLAR CONTRAST: ICD-10-PCS | Performed by: UROLOGY

## 2020-01-16 PROCEDURE — 2709999900 HC NON-CHARGEABLE SUPPLY: Performed by: UROLOGY

## 2020-01-16 PROCEDURE — 2580000003 HC RX 258: Performed by: ANESTHESIOLOGY

## 2020-01-16 PROCEDURE — 94640 AIRWAY INHALATION TREATMENT: CPT

## 2020-01-16 PROCEDURE — 1200000000 HC SEMI PRIVATE

## 2020-01-16 PROCEDURE — 3600000004 HC SURGERY LEVEL 4 BASE: Performed by: UROLOGY

## 2020-01-16 PROCEDURE — 6360000002 HC RX W HCPCS: Performed by: INTERNAL MEDICINE

## 2020-01-16 PROCEDURE — 2500000003 HC RX 250 WO HCPCS: Performed by: NURSE ANESTHETIST, CERTIFIED REGISTERED

## 2020-01-16 PROCEDURE — C1769 GUIDE WIRE: HCPCS | Performed by: UROLOGY

## 2020-01-16 PROCEDURE — 74420 UROGRAPHY RTRGR +-KUB: CPT

## 2020-01-16 PROCEDURE — 6370000000 HC RX 637 (ALT 250 FOR IP): Performed by: PHYSICIAN ASSISTANT

## 2020-01-16 PROCEDURE — 85025 COMPLETE CBC W/AUTO DIFF WBC: CPT

## 2020-01-16 PROCEDURE — 2580000003 HC RX 258: Performed by: UROLOGY

## 2020-01-16 PROCEDURE — 94761 N-INVAS EAR/PLS OXIMETRY MLT: CPT

## 2020-01-16 PROCEDURE — 2580000003 HC RX 258: Performed by: NURSE ANESTHETIST, CERTIFIED REGISTERED

## 2020-01-16 PROCEDURE — 7100000001 HC PACU RECOVERY - ADDTL 15 MIN: Performed by: UROLOGY

## 2020-01-16 PROCEDURE — 7100000000 HC PACU RECOVERY - FIRST 15 MIN: Performed by: UROLOGY

## 2020-01-16 PROCEDURE — 6360000002 HC RX W HCPCS: Performed by: UROLOGY

## 2020-01-16 RX ORDER — PROCHLORPERAZINE EDISYLATE 5 MG/ML
5 INJECTION INTRAMUSCULAR; INTRAVENOUS
Status: DISCONTINUED | OUTPATIENT
Start: 2020-01-16 | End: 2020-01-16 | Stop reason: HOSPADM

## 2020-01-16 RX ORDER — FENTANYL CITRATE 50 UG/ML
25 INJECTION, SOLUTION INTRAMUSCULAR; INTRAVENOUS EVERY 5 MIN PRN
Status: DISCONTINUED | OUTPATIENT
Start: 2020-01-16 | End: 2020-01-16 | Stop reason: HOSPADM

## 2020-01-16 RX ORDER — SODIUM CHLORIDE 0.9 % (FLUSH) 0.9 %
10 SYRINGE (ML) INJECTION PRN
Status: DISCONTINUED | OUTPATIENT
Start: 2020-01-16 | End: 2020-01-16 | Stop reason: HOSPADM

## 2020-01-16 RX ORDER — HYDRALAZINE HYDROCHLORIDE 20 MG/ML
5 INJECTION INTRAMUSCULAR; INTRAVENOUS EVERY 10 MIN PRN
Status: DISCONTINUED | OUTPATIENT
Start: 2020-01-16 | End: 2020-01-16 | Stop reason: HOSPADM

## 2020-01-16 RX ORDER — FENTANYL CITRATE 50 UG/ML
INJECTION, SOLUTION INTRAMUSCULAR; INTRAVENOUS PRN
Status: DISCONTINUED | OUTPATIENT
Start: 2020-01-16 | End: 2020-01-16 | Stop reason: SDUPTHER

## 2020-01-16 RX ORDER — MAGNESIUM HYDROXIDE 1200 MG/15ML
LIQUID ORAL
Status: COMPLETED | OUTPATIENT
Start: 2020-01-16 | End: 2020-01-16

## 2020-01-16 RX ORDER — LABETALOL HYDROCHLORIDE 5 MG/ML
5 INJECTION, SOLUTION INTRAVENOUS EVERY 10 MIN PRN
Status: DISCONTINUED | OUTPATIENT
Start: 2020-01-16 | End: 2020-01-16 | Stop reason: HOSPADM

## 2020-01-16 RX ORDER — HYDROMORPHONE HCL 110MG/55ML
0.3 PATIENT CONTROLLED ANALGESIA SYRINGE INTRAVENOUS EVERY 5 MIN PRN
Status: DISCONTINUED | OUTPATIENT
Start: 2020-01-16 | End: 2020-01-16 | Stop reason: HOSPADM

## 2020-01-16 RX ORDER — LIDOCAINE HYDROCHLORIDE 10 MG/ML
1 INJECTION, SOLUTION EPIDURAL; INFILTRATION; INTRACAUDAL; PERINEURAL
Status: DISCONTINUED | OUTPATIENT
Start: 2020-01-16 | End: 2020-01-16 | Stop reason: HOSPADM

## 2020-01-16 RX ORDER — ONDANSETRON 2 MG/ML
4 INJECTION INTRAMUSCULAR; INTRAVENOUS
Status: DISCONTINUED | OUTPATIENT
Start: 2020-01-16 | End: 2020-01-16 | Stop reason: HOSPADM

## 2020-01-16 RX ORDER — ONDANSETRON 2 MG/ML
INJECTION INTRAMUSCULAR; INTRAVENOUS PRN
Status: DISCONTINUED | OUTPATIENT
Start: 2020-01-16 | End: 2020-01-16 | Stop reason: SDUPTHER

## 2020-01-16 RX ORDER — SODIUM CHLORIDE, SODIUM LACTATE, POTASSIUM CHLORIDE, CALCIUM CHLORIDE 600; 310; 30; 20 MG/100ML; MG/100ML; MG/100ML; MG/100ML
INJECTION, SOLUTION INTRAVENOUS CONTINUOUS
Status: DISCONTINUED | OUTPATIENT
Start: 2020-01-16 | End: 2020-01-17

## 2020-01-16 RX ORDER — SODIUM CHLORIDE 0.9 % (FLUSH) 0.9 %
10 SYRINGE (ML) INJECTION EVERY 12 HOURS SCHEDULED
Status: DISCONTINUED | OUTPATIENT
Start: 2020-01-16 | End: 2020-01-16 | Stop reason: HOSPADM

## 2020-01-16 RX ORDER — SODIUM CHLORIDE 9 MG/ML
INJECTION, SOLUTION INTRAVENOUS CONTINUOUS PRN
Status: DISCONTINUED | OUTPATIENT
Start: 2020-01-16 | End: 2020-01-16 | Stop reason: SDUPTHER

## 2020-01-16 RX ORDER — CEFAZOLIN SODIUM 2 G/100ML
2 INJECTION, SOLUTION INTRAVENOUS ONCE
Status: COMPLETED | OUTPATIENT
Start: 2020-01-16 | End: 2020-01-16

## 2020-01-16 RX ORDER — DEXAMETHASONE SODIUM PHOSPHATE 4 MG/ML
INJECTION, SOLUTION INTRA-ARTICULAR; INTRALESIONAL; INTRAMUSCULAR; INTRAVENOUS; SOFT TISSUE PRN
Status: DISCONTINUED | OUTPATIENT
Start: 2020-01-16 | End: 2020-01-16 | Stop reason: SDUPTHER

## 2020-01-16 RX ORDER — HYDRALAZINE HYDROCHLORIDE 10 MG/1
10 TABLET, FILM COATED ORAL EVERY 6 HOURS SCHEDULED
Status: DISCONTINUED | OUTPATIENT
Start: 2020-01-16 | End: 2020-01-17

## 2020-01-16 RX ORDER — PROPOFOL 10 MG/ML
INJECTION, EMULSION INTRAVENOUS PRN
Status: DISCONTINUED | OUTPATIENT
Start: 2020-01-16 | End: 2020-01-16 | Stop reason: SDUPTHER

## 2020-01-16 RX ORDER — LIDOCAINE HYDROCHLORIDE 20 MG/ML
INJECTION, SOLUTION EPIDURAL; INFILTRATION; INTRACAUDAL; PERINEURAL PRN
Status: DISCONTINUED | OUTPATIENT
Start: 2020-01-16 | End: 2020-01-16 | Stop reason: SDUPTHER

## 2020-01-16 RX ADMIN — IPRATROPIUM BROMIDE AND ALBUTEROL SULFATE 1 AMPULE: .5; 3 SOLUTION RESPIRATORY (INHALATION) at 15:54

## 2020-01-16 RX ADMIN — METOPROLOL SUCCINATE 25 MG: 25 TABLET, FILM COATED, EXTENDED RELEASE ORAL at 11:59

## 2020-01-16 RX ADMIN — SODIUM CHLORIDE: 9 INJECTION, SOLUTION INTRAVENOUS at 17:08

## 2020-01-16 RX ADMIN — IPRATROPIUM BROMIDE AND ALBUTEROL SULFATE 1 AMPULE: .5; 3 SOLUTION RESPIRATORY (INHALATION) at 12:34

## 2020-01-16 RX ADMIN — VENLAFAXINE 75 MG: 37.5 TABLET ORAL at 21:09

## 2020-01-16 RX ADMIN — PREDNISONE 40 MG: 20 TABLET ORAL at 21:08

## 2020-01-16 RX ADMIN — SODIUM CHLORIDE: 9 INJECTION, SOLUTION INTRAVENOUS at 02:25

## 2020-01-16 RX ADMIN — CEFAZOLIN SODIUM 2 G: 2 INJECTION, SOLUTION INTRAVENOUS at 17:16

## 2020-01-16 RX ADMIN — LIDOCAINE HYDROCHLORIDE 100 MG: 20 INJECTION, SOLUTION EPIDURAL; INFILTRATION; INTRACAUDAL; PERINEURAL at 17:26

## 2020-01-16 RX ADMIN — SODIUM CHLORIDE, POTASSIUM CHLORIDE, SODIUM LACTATE AND CALCIUM CHLORIDE: 600; 310; 30; 20 INJECTION, SOLUTION INTRAVENOUS at 18:46

## 2020-01-16 RX ADMIN — Medication 10 ML: at 02:15

## 2020-01-16 RX ADMIN — METHYLPREDNISOLONE SODIUM SUCCINATE 40 MG: 40 INJECTION, POWDER, FOR SOLUTION INTRAMUSCULAR; INTRAVENOUS at 02:15

## 2020-01-16 RX ADMIN — HYDRALAZINE HYDROCHLORIDE 10 MG: 10 TABLET, FILM COATED ORAL at 11:59

## 2020-01-16 RX ADMIN — IPRATROPIUM BROMIDE AND ALBUTEROL SULFATE 1 AMPULE: .5; 3 SOLUTION RESPIRATORY (INHALATION) at 09:02

## 2020-01-16 RX ADMIN — FENTANYL CITRATE 100 MCG: 50 INJECTION, SOLUTION INTRAMUSCULAR; INTRAVENOUS at 17:22

## 2020-01-16 RX ADMIN — Medication 2 PUFF: at 09:02

## 2020-01-16 RX ADMIN — Medication 2 PUFF: at 19:43

## 2020-01-16 RX ADMIN — HYDRALAZINE HYDROCHLORIDE 10 MG: 10 TABLET, FILM COATED ORAL at 21:09

## 2020-01-16 RX ADMIN — DEXAMETHASONE SODIUM PHOSPHATE 10 MG: 4 INJECTION, SOLUTION INTRAMUSCULAR; INTRAVENOUS at 17:26

## 2020-01-16 RX ADMIN — PROPOFOL 200 MG: 10 INJECTION, EMULSION INTRAVENOUS at 17:26

## 2020-01-16 RX ADMIN — ONDANSETRON 4 MG: 2 INJECTION INTRAMUSCULAR; INTRAVENOUS at 17:26

## 2020-01-16 RX ADMIN — IPRATROPIUM BROMIDE AND ALBUTEROL SULFATE 1 AMPULE: .5; 3 SOLUTION RESPIRATORY (INHALATION) at 19:43

## 2020-01-16 ASSESSMENT — PULMONARY FUNCTION TESTS
PIF_VALUE: 12
PIF_VALUE: 14
PIF_VALUE: 12
PIF_VALUE: 5
PIF_VALUE: 14
PIF_VALUE: 1
PIF_VALUE: 12
PIF_VALUE: 19
PIF_VALUE: 12
PIF_VALUE: 14
PIF_VALUE: 14
PIF_VALUE: 10
PIF_VALUE: 12
PIF_VALUE: 12
PIF_VALUE: 1
PIF_VALUE: 12
PIF_VALUE: 2
PIF_VALUE: 14
PIF_VALUE: 2
PIF_VALUE: 12
PIF_VALUE: 12
PIF_VALUE: 14
PIF_VALUE: 1
PIF_VALUE: 0
PIF_VALUE: 1
PIF_VALUE: 14
PIF_VALUE: 14
PIF_VALUE: 10
PIF_VALUE: 14
PIF_VALUE: 1
PIF_VALUE: 9
PIF_VALUE: 12
PIF_VALUE: 1
PIF_VALUE: 23
PIF_VALUE: 2
PIF_VALUE: 22

## 2020-01-16 ASSESSMENT — PAIN - FUNCTIONAL ASSESSMENT: PAIN_FUNCTIONAL_ASSESSMENT: 0-10

## 2020-01-16 ASSESSMENT — PAIN SCALES - GENERAL: PAINLEVEL_OUTOF10: 0

## 2020-01-16 NOTE — PROGRESS NOTES
MD Ginette Riley MD Marygrace Jacob, MD               Office: [phone]                      Fax: [phone] INPATIENT PROGRESS NOTE:     PATIENT NAME: Liliana Hagen  : 1945  MRN: [id number]  SERVICE DATE: 2020   SERVICE TIME: 9:37 AM       IMPRESSION:       SANAM (on CKD - 3 advancing):  Severe: Oligouric  - BL CKD (no previous renal out pt f/up reported) --> Scr ~ 1.4-1.5 --> to 1.8 in 2019 ---> 2.4 on admission  : Etiology of SANAM - presumed pre-renal for now, + hydronephrosis  - UA: hematuria, trace pro. +LE, WBC   - f/up UC x  : CKD-3 - h/o proteinuria , hematuria, needs work up (So I encouraged f/up)     COPD exacerbation  - needing nebs, IV steorids,   - active smoker     H/O   -Moderate degree of right hydronephrosis and hydroureter,  - h/o right double-J ureteral stent , h/o bladder cancer?   - h/o left nephrectomy (she reported due to \"cancer\" )    -  renal US  --> Moderate to severe right hydronephrosis. - CT a/p w/o IVC: 1/15: Marked right hydronephrosis, slightly greater as compared to prior. Persistent right hydroureter. Lauren Pierce the presence of right nephroureteral  stent, functionality of the stent is questioned. - consulted urology, for ureteral stent assessment         H/O gastric bypass surgery  H/O medical non-compliance .   Active smoker   [place]        RECOMMENDATIONS:   - f/up urology plans for stent exchange today -   - keep low rate IVF  - add hydralzine for HTN uncontrolled, but keep ~ 130-140s for now  - f/up UCx  - continue to monitor acidosis - should stabalize   - needs strict I/Os       Associated problems:   - acidosis- non-AGMA - likely d/to CKD - f/up for now   - Anemia: mild so f/up      Other problems:  Patient Active Problem List   Diagnosis    Asthma    Bipolar I disorder, single manic episode ([facility] [address]    Chronic airway obstruction, not elsewhere classified    Recurrent major depressive disorder, in remission ([facility]    Diabetes mellitus type 2 in nonobese ([facility]    Tobacco use disorder    Essential hypertension    Osteoarthrosis involving lower leg    History of primary bladder cancer ( Dr Xavier Saenz)   Darieladav Akhtaralyssa Venous insufficiency    Dermatosclerosis (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 - Nephrotoxic medications have been discontinued. - Dose adjusted and appropriate. - Dose meds for eGFR <!5 mL/min/1.73m2 during SANAM    - Avoid heavy opioids due to renal failure - may use very low dose dilaudid / fentanyl with close monitoring of CNS and respiratory depression. Please refer to the orders. High Complexity. Multiple complex problems. Discussed with patient,  and treatment team-and hospitalist Dr Demar Eddy  Thank you for allowing me to participate in this patient's care. Please do not hesitate to contact me with any questions/concerns. We will follow along with you. Murleen Goldberg, MD       [facility] [address] [place]: [phone] or [employer]  Fax: [phone]        Subjective:  Reported no active complaints, . Was frustated about NPO for OR at 2pm today       Past medical / Surgical, Social, Family medical history reviewed by me. MEDICATIONS: reviewed by me. Prior to Admission Medications:  Medications Prior to Admission: Cyanocobalamin (VITAMIN B-12) 1000 MCG extended release tablet, Take 1 tablet by mouth daily  acetaminophen (TYLENOL) 500 MG tablet, Take 500 mg by mouth every 6 hours as needed for Pain.   nicotine (NICODERM CQ) 14 MG/24HR, Place 1 patch onto the skin daily 1.01 11/01/2019     Warfarin PT/INR:  No components found for: Renato Matthews  PTT:    Lab Results   Component Value Date    APTT 29.1 11/01/2019   [APTT}  Last 3 Troponin:    Lab Results   Component Value Date    TROPONINI 0.03 01/14/2020    TROPONINI <0.01 11/01/2019    TROPONINI <0.01 11/01/2019       U/A:    Lab Results   Component Value Date    COLORU YELLOW 01/14/2020    PROTEINU TRACE 01/14/2020    PHUR 5.5 01/14/2020    WBCUA 15 01/14/2020    RBCUA 5-10 01/14/2020    YEAST none 08/12/2016    BACTERIA none 08/12/2016    CLARITYU CLOUDY 01/14/2020    SPECGRAV 1.016 01/14/2020    LEUKOCYTESUR MODERATE 01/14/2020    UROBILINOGEN 0.2 01/14/2020    BILIRUBINUR Negative 01/14/2020    BLOODU MODERATE 01/14/2020    GLUCOSEU Negative 01/14/2020     Microalbumen/Creatinine ratio:  No components found for: RUCREAT  24 Hour Urine for Protein:  No components found for: RAWUPRO, UHRS3, ELGV17TE, UTV3  24 Hour Urine for Creatinine Clearance:  No components found for: CREAT4, UHRS10, UTV10  Urine Toxicology:  No components found for: Jose Carlos Lights, IBENZO, ICOCAINE, IMARTHC, IOPIATES, IPHENCYC    HgBA1c:    Lab Results   Component Value Date    LABA1C 5.1 03/07/2018     RPR:  No results found for: RPR  HIV:  No results found for: HIV  MEHUL:    Lab Results   Component Value Date    MEHUL Negative 08/10/2011     RF:  No results found for: RF  DSDNA:  No components found for: DNA  AMYLASE:    Lab Results   Component Value Date    AMYLASE 36 02/09/2010     LIPASE:  No results found for: LIPASE  Fibrinogen Level:  No components found for: FIB       BELOW MENTIONED RADIOLOGY STUDY RESULTS BY ME:    Xr Chest Standard (2 Vw)    Result Date: 1/14/2020  EXAMINATION: TWO XRAY VIEWS OF THE CHEST 1/14/2020 3:23 am COMPARISON: 11/01/2019. HISTORY: ORDERING SYSTEM PROVIDED HISTORY: ? PNA TECHNOLOGIST PROVIDED HISTORY: Reason for exam:->? PNA Initial evaluation. FINDINGS: The cardiac silhouette appears enlarged.   No focal consolidation. Hyperinflation of the lungs bilaterally. There is no pleural effusion or pneumothorax. The osseous structures are markedly osteopenic, which limits evaluation. 1. Enlarged cardiac silhouette. 2. Otherwise, no convincing acute cardiopulmonary abnormality. 3. Hyperinflation of the lungs bilaterally, which can be seen with COPD. Narrative   EXAMINATION:   RETROPERITONEAL ULTRASOUND OF THE KIDNEYS AND URINARY BLADDER       1/14/2020       COMPARISON:   10/20/2017 CT chest, April 2015 renal ultrasound       HISTORY:   ORDERING SYSTEM PROVIDED HISTORY: joe, h/o hydronephrosis, needing stent.    TECHNOLOGIST PROVIDED HISTORY:   Reason for exam:->joe, h/o hydronephrosis, needing stent.       History of left nephrectomy       FINDINGS:       Kidneys:       The right kidney measures 12.0 cm in length.  Moderate to severe right   hydronephrosis is evident.  No calcified shadowing renal pelvic stones are   identified.       The left kidney is surgically absent.           Bladder:       The urinary bladder is mostly collapsed.  Ureteral jets were not visualized.           Impression   Moderate to severe right hydronephrosis.       Status post left nephrectomy.

## 2020-01-16 NOTE — PLAN OF CARE
Problem: Falls - Risk of:  Goal: Will remain free from falls  Description  Will remain free from falls  1/16/2020 0750 by Lola Krishnan RN  Outcome: Ongoing  Note:   High fall risk per Alvarado scale. Fall precautions in place, bed alarm engaged. Non-skid footwear on patient, belongings within reach, bed in lowest position. Problem:  Activity Intolerance:  Goal: Ability to perform activities of daily living will improve  Description  Ability to perform activities of daily living will improve  Outcome: Ongoing     Problem: Tobacco Use:  Goal: Will participate in inpatient tobacco-use cessation counseling  Description  Will participate in inpatient tobacco-use cessation counseling  1/16/2020 0750 by Lola Krishnan RN  Outcome: Ongoing

## 2020-01-16 NOTE — PROGRESS NOTES
Patient received to PACU in stable condition. VSS. No signs of distress. Abdomin soft non tender. Will continue to monitor.

## 2020-01-16 NOTE — PROGRESS NOTES
Patient prepared for surgery. Brief taken off, no clothing on under hospital gown. Daughter and grandson at bedside, will accompany patient to surgery.

## 2020-01-16 NOTE — PROGRESS NOTES
[facility] PROGRESS NOTE    1/16/2020 11:00 AM        Name: Ree Strange . Admitted: 1/14/2020  Primary Care Provider: No primary care provider on file. (Tel: None)                        Subjective:  . No acute events overnight. Pt stil complains of sob and cough   Getting breathing treatment prns  Still whezzing and sob on exertion.    No fevers  States improving but still not at baseline  Reviewed interval ancillary notes    Current Medications  hydrALAZINE (APRESOLINE) tablet 10 mg, 4 times per day  acetaminophen (TYLENOL) tablet 650 mg, Q6H PRN  metoprolol succinate (TOPROL XL) extended release tablet 25 mg, Daily  mometasone-formoterol (DULERA) 200-5 MCG/ACT inhaler 2 puff, BID  venlafaxine (EFFEXOR) tablet 75 mg, BID  sodium chloride flush 0.9 % injection 10 mL, 2 times per day  sodium chloride flush 0.9 % injection 10 mL, PRN  magnesium hydroxide (MILK OF MAGNESIA) 400 MG/5ML suspension 30 mL, Daily PRN  ondansetron (ZOFRAN) injection 4 mg, Q6H PRN  ipratropium-albuterol (DUONEB) nebulizer solution 1 ampule, Q4H WA  albuterol (PROVENTIL) nebulizer solution 2.5 mg, Q2H PRN  predniSONE (DELTASONE) tablet 40 mg, Daily  heparin (porcine) injection 5,000 Units, 3 times per day  influenza quadrivalent split vaccine (FLUZONE;FLUARIX;FLULAVAL;AFLURIA) injection 0.5 mL, Prior to discharge  nicotine (NICODERM CQ) 21 MG/24HR 1 patch, Daily        Objective:  BP (!) 162/63   Pulse 65   Temp 97.8 °F (36.6 °C) (Temporal)   Resp 16   Ht 5' 6\" (1.676 m)   Wt 132 lb (59.9 kg)   SpO2 97%   BMI 21.31 kg/m²     Intake/Output Summary (Last 24 hours) at 1/16/2020 1100  Last data filed at 1/16/2020 0541  Gross per 24 hour   Intake 0 ml   Output 350 ml   Net -350 ml      Wt Readings from Last 3 Encounters:   01/16/20 132 lb (59.9 kg)   11/01/19 124 lb (56.2 kg)   09/24/19 124 lb (56.2 kg)       General appearance:  Appears comfortable  Eyes: Sclera clear. Pupils equal.  ENT: Moist oral mucosa. Trachea midline, no adenopathy. Cardiovascular: Regular rhythm, normal S1, S2. No murmur. No edema in lower extremities  Respiratory: noted for wheezing and some tachypnea when asked to exert. No rales. No crackles cassandra crackles. GI: Abdomen soft, no tenderness, not distended, normal bowel sounds  Musculoskeletal: No cyanosis in digits, neck supple  Neurology: CN 2-12 grossly intact. No speech or motor deficits  Psych: Normal affect. Alert and oriented in time, place and person  Skin: Warm, dry, normal turgor    Labs and Tests:  CBC:   Recent Labs     01/14/20  0322 01/15/20  0511 01/16/20  1015   WBC 6.6 12.4* 10.7   HGB 12.1 10.9* 11.3*    190 168     BMP:    Recent Labs     01/14/20  0322 01/15/20  0512 01/16/20  1015    139 138   K 4.4 4.0 4.1    108 109   CO2 19* 16* 17*   BUN 48* 51* 52*   CREATININE 2.4* 2.3* 2.0*   GLUCOSE 108* 141* 109*     Hepatic:   Recent Labs     01/14/20  0322   AST 15   ALT 8*   BILITOT <0.2   ALKPHOS 139*       Discussed care with family and patient             Spent 30  minutes with patient and family at bedside and on unit reviewing medical records and labs, spent greater than 50% time counseling patient and family on diagnosis and plan   Problem List  Active Problems:    Acute asthma exacerbation  Resolved Problems:    * No resolved hospital problems. *       Assessment & Plan:   1.  Acute COPD exacerbation  -Proving try to wean oxygen down and steroids today  -Continue duo nebs    Acute on chronic renal failure  -With hydronephrosis  -Creatinine is trending down.  -Plan for possible cystoscopy today       Diet: Diet NPO Effective Now  Code:Full Code  DVT PPX lovenox       Rigo Salvador MD   1/16/2020 11:00 AM

## 2020-01-16 NOTE — ANESTHESIA POSTPROCEDURE EVALUATION
Department of Anesthesiology  Postprocedure Note    Patient: Stevie Carmichael  MRN: [id number]  YOB: 1945  Date of evaluation: 1/16/2020  Time:  6:09 PM     Procedure Summary     Date:  01/16/20 Room / Location:  [address] [place] [address]    Anesthesia Start:  1721 Anesthesia Stop:  1803    Procedure:  CYSTOSCOPY RIGHT URETERAL STENT REMOVAL, RIGHT RETROGRADE PYELOGRAM (Right ) Diagnosis:  (HYDRONEPHROSIS)    Surgeon:  Morales Shah MD Responsible Provider:  Raya Raya MD    Anesthesia Type:  general ASA Status:  3          Anesthesia Type: general    Emmanuel Phase I: Emmanuel Score: 8    Emmanuel Phase II:      Last vitals: Reviewed and per EMR flowsheets.        Anesthesia Post Evaluation    Patient location during evaluation: PACU  Patient participation: complete - patient participated  Level of consciousness: awake and alert  Airway patency: patent  Nausea & Vomiting: no nausea and no vomiting  Complications: no  Cardiovascular status: hemodynamically stable  Respiratory status: acceptable  Hydration status: stable

## 2020-01-16 NOTE — OP NOTE
Urology Operative Report  [facility]    Provider: Luiz Torrez MD Patient ID:  Admission Date: 2020 Name: Harper Nageotte Date: 2020  MRN: [id number]   Patient Location: OR/NONE : 1945  Attending: Leticia Oh MD Date of Service: 2020  PCP: No primary care provider on file. Date of Operation: 2020    Preoperative Diagnosis: RIGHT side hydronephrosis    Postoperative Diagnosis: same    Procedure:    1. Cystoscopy  2. Right side ureteral stent removal  3. Fluoroscopic imaging < 1 hr physician time  4. RIGHT side retrograde ureteropyelogram    Surgeon:   Luiz Torrez MD    Anesthesia: General LMA anesthesia    Indications: Taylor Moraes is a 66 y.o. female who presents for the above named surgery. Informed consent was obtained and the risks, benefits, and details of the procedure were explained to the patient who elected to proceed. Details of Procedure: The patient was brought to the operating room and placed in the supine position on the operating room table. SCDs were placed on the lower extremities. Following induction of anesthesia the patient was positioned in a lithotomy position, all pressure points were padded, and the genitals were prepped and draped in the usual sterile fashion. A routine timeout was performed, confirming the patient, procedure, site, risk of fire, patient allergies and confirming that preoperative antibiotics had been administered prior to beginning. A 21 fr rigid cystoscope was advance via a normal appearing urethra into the bladder. The bladder was inspected and there were no suspicious lesions, stones or diverticula seen. Attention was turned to the right Rachana-ureteral orifice. The old ureteral stent was grasped and extracted to the urethral meatus. An attempt was made to pass the wire up the stent, but this did not pass. We then advanced the cystoscope back into the bladder.  We attempted to pass the wire

## 2020-01-16 NOTE — PLAN OF CARE
Problem: Falls - Risk of:  Goal: Will remain free from falls  Description  Will remain free from falls  Note:   Pt has remained free from any falls so far this shift. Bed alarm activated. Non-skid socks on. Bed in lowest and locked position. Belongings within reach. Will continue to monitor. Call light within reach. Problem: Tobacco Use:  Goal: Will participate in inpatient tobacco-use cessation counseling  Description  Will participate in inpatient tobacco-use cessation counseling  Note:   Pt repeatedly requesting to go outside and smoke a cigarette. Pt supplied with nicotine patch and educated on hospital policy. Will continue to monitor. Call light within reach.

## 2020-01-16 NOTE — ANESTHESIA PRE PROCEDURE
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    Chronic constipation K59.09    COPD exacerbation (HCC) J44.1    Acute asthma exacerbation J45.901       Past Medical History:        Diagnosis Date    Arthritis     Asthma     Bipolar 1 disorder ([facility]     Cancer ([facility]     bladder    COPD (chronic obstructive pulmonary disease) ([facility]     Depression     controlled w/meds    Diabetes mellitus ([facility]     diet control    Hypertension     Neuropathy     toes    Obesity, unspecified     Osteoarthrosis, unspecified whether generalized or localized, lower leg     PVD (peripheral vascular disease) ([facility]     Tobacco use disorder     Unspecified cataract        Past Surgical History:        Procedure Laterality Date    ABDOMEN SURGERY      BLADDER REMOVAL      partial    CHOLECYSTECTOMY      COLONOSCOPY      CYSTOSCOPY  4-7-10    fulgeration of bladder tumor    CYSTOSCOPY  7/14/10    Cytology    CYSTOSCOPY  12/15/10    CYSTOSCOPY  3/2/11    COLLAGEN INJECTION    CYSTOSCOPY  6-    cytology    CYSTOSCOPY  10/19/11    CYSTOSCOPY  4/11/12    CYSTOSCOPY  10/8/12    cytology    CYSTOSCOPY  4/7/14    cysto/cytology    CYSTOSCOPY  04/29/2015    Cytology study    EYE SURGERY      cataract left eye    FEMUR FRACTURE SURGERY Left [id number]    GASTRIC BYPASS SURGERY      JOINT REPLACEMENT      left knee    OTHER SURGICAL HISTORY Left 10/24/2016    left nephroureterectomy    OTHER SURGICAL HISTORY  03/22/2017    RIGHT ureteral stent placement, RIGHT nephrostomy drain placement     TOTAL KNEE ARTHROPLASTY Left        Social History:    Social History     Tobacco Use    Smoking status: Current Every Day Smoker     Packs/day: 1.00     Years: 55.00     Pack years: 55.00     Types: Cigarettes    Smokeless tobacco: Never Used   Substance Use Topics    Alcohol use: Not Currently     Alcohol/week: 0.0 standard drinks                                Ready to quit: Not Answered  Counseling given: Not Answered      Vital Signs (Current):   Vitals:    01/16/20 0817 01/16/20 0902 01/16/20 1145 01/16/20 1234   BP: (!) 173/70  (!) 173/70    Pulse: 60  60    Resp:  16 16 16   Temp:   97.8 °F (36.6 °C)    TempSrc:   Temporal    SpO2:  97% 98% 98%   Weight:       Height:                                                  BP Readings from Last 3 Encounters:   01/16/20 (!) 173/70   11/01/19 (!) 143/43   09/24/19 (!) 139/53       NPO Status: Time of last liquid consumption: 1159(sips with meds)                        Time of last solid consumption: 2330                        Date of last liquid consumption: 01/16/20                        Date of last solid food consumption: 01/15/20    BMI:   Wt Readings from Last 3 Encounters:   01/16/20 132 lb (59.9 kg)   11/01/19 124 lb (56.2 kg)   09/24/19 124 lb (56.2 kg)     Body mass index is 21.31 kg/m². CBC:   Lab Results   Component Value Date    WBC 10.7 01/16/2020    RBC 3.64 01/16/2020    HGB 11.3 01/16/2020    HCT 34.8 01/16/2020    MCV 95.7 01/16/2020    RDW 13.7 01/16/2020     01/16/2020       CMP:   Lab Results   Component Value Date     01/16/2020    K 4.1 01/16/2020    K 4.0 01/15/2020     01/16/2020    CO2 17 01/16/2020    BUN 52 01/16/2020    CREATININE 2.0 01/16/2020    GFRAA 29 01/16/2020    GFRAA 57 10/08/2012    AGRATIO 0.9 01/14/2020    LABGLOM 24 01/16/2020    GLUCOSE 109 01/16/2020    PROT 7.8 01/14/2020    PROT 7.5 02/10/2010    CALCIUM 8.5 01/16/2020    BILITOT <0.2 01/14/2020    ALKPHOS 139 01/14/2020    AST 15 01/14/2020    ALT 8 01/14/2020       POC Tests: No results for input(s): POCGLU, POCNA, POCK, POCCL, POCBUN, POCHEMO, POCHCT in the last 72 hours.     Coags:   Lab Results   Component Value Date    PROTIME 11.5 11/01/2019    INR 1.01 11/01/2019    APTT 29.1 11/01/2019       HCG (If Applicable): No results found for: PREGTESTUR, PREGSERUM, HCG, HCGQUANT     ABGs: No results found for: PHART, PO2ART, KVY9XWH, TIO8SML, BEART, N5RULCOO     Type & Screen (If Applicable):  No results found for: LABABO, LABRH    Anesthesia Evaluation   no history of anesthetic complications:   Airway: Mallampati: I  TM distance: >3 FB   Neck ROM: full  Mouth opening: > = 3 FB Dental:    (+) upper dentures and lower dentures      Pulmonary:   (+) COPD (Acute exacerbation on O2 and steriods and duoneb):  asthma:                            Cardiovascular:    (+) hypertension:,                   Neuro/Psych:   (+) psychiatric history: stable with treatment            GI/Hepatic/Renal:   (+) renal disease: ARF and CRI,           Endo/Other:    (+) DiabetesType II DM, , : arthritis:., .                 Abdominal:           Vascular:   + PVD, aortic or cerebral, . Anesthesia Plan      general     ASA 3     (Will give scheduled duoneb prior to procedure as needed)  Induction: intravenous. MIPS: Postoperative opioids intended and Prophylactic antiemetics administered. Anesthetic plan and risks discussed with patient. Use of blood products discussed with patient whom. Plan discussed with CRNA.                   Mar Méndez MD   1/16/2020

## 2020-01-16 NOTE — FLOWSHEET NOTE
Patient back to room 3302 from surgery. AOx4, son at bedside. Handoff report given by PACU RN. All questions answered.       01/16/20 1830   Vital Signs   Temp 97.9 °F (36.6 °C)   Temp Source Temporal   Pulse 74   Heart Rate Source Monitor   Resp 14   BP (!) 153/70   BP Location Right upper arm   Patient Position Left side   Level of Consciousness 0   MEWS Score 0   Oxygen Therapy   SpO2 99 %   O2 Device Nasal cannula   O2 Flow Rate (L/min) 2 L/min

## 2020-01-17 LAB
ALBUMIN SERPL-MCNC: 3.2 G/DL (ref 3.4–5)
ANION GAP SERPL CALCULATED.3IONS-SCNC: 12 MMOL/L (ref 3–16)
BASOPHILS ABSOLUTE: 0 K/UL (ref 0–0.2)
BASOPHILS RELATIVE PERCENT: 0 %
BUN BLDV-MCNC: 50 MG/DL (ref 7–20)
CALCIUM SERPL-MCNC: 8.2 MG/DL (ref 8.3–10.6)
CHLORIDE BLD-SCNC: 108 MMOL/L (ref 99–110)
CO2: 17 MMOL/L (ref 21–32)
CREAT SERPL-MCNC: 2 MG/DL (ref 0.6–1.2)
EOSINOPHILS ABSOLUTE: 0 K/UL (ref 0–0.6)
EOSINOPHILS RELATIVE PERCENT: 0 %
GFR AFRICAN AMERICAN: 29
GFR NON-AFRICAN AMERICAN: 24
GLUCOSE BLD-MCNC: 114 MG/DL (ref 70–99)
HCT VFR BLD CALC: 34 % (ref 36–48)
HEMOGLOBIN: 11.1 G/DL (ref 12–16)
LYMPHOCYTES ABSOLUTE: 0.3 K/UL (ref 1–5.1)
LYMPHOCYTES RELATIVE PERCENT: 4.5 %
MCH RBC QN AUTO: 31.4 PG (ref 26–34)
MCHC RBC AUTO-ENTMCNC: 32.6 G/DL (ref 31–36)
MCV RBC AUTO: 96.2 FL (ref 80–100)
MONOCYTES ABSOLUTE: 0.1 K/UL (ref 0–1.3)
MONOCYTES RELATIVE PERCENT: 2.3 %
NEUTROPHILS ABSOLUTE: 6 K/UL (ref 1.7–7.7)
NEUTROPHILS RELATIVE PERCENT: 93.2 %
PDW BLD-RTO: 14 % (ref 12.4–15.4)
PHOSPHORUS: 4.3 MG/DL (ref 2.5–4.9)
PLATELET # BLD: 156 K/UL (ref 135–450)
PMV BLD AUTO: 10.4 FL (ref 5–10.5)
POTASSIUM SERPL-SCNC: 5.1 MMOL/L (ref 3.5–5.1)
RBC # BLD: 3.53 M/UL (ref 4–5.2)
SODIUM BLD-SCNC: 137 MMOL/L (ref 136–145)
WBC # BLD: 6.4 K/UL (ref 4–11)

## 2020-01-17 PROCEDURE — 80069 RENAL FUNCTION PANEL: CPT

## 2020-01-17 PROCEDURE — 6370000000 HC RX 637 (ALT 250 FOR IP): Performed by: UROLOGY

## 2020-01-17 PROCEDURE — 36415 COLL VENOUS BLD VENIPUNCTURE: CPT

## 2020-01-17 PROCEDURE — 1200000000 HC SEMI PRIVATE

## 2020-01-17 PROCEDURE — 6370000000 HC RX 637 (ALT 250 FOR IP): Performed by: INTERNAL MEDICINE

## 2020-01-17 PROCEDURE — 97530 THERAPEUTIC ACTIVITIES: CPT

## 2020-01-17 PROCEDURE — 94640 AIRWAY INHALATION TREATMENT: CPT

## 2020-01-17 PROCEDURE — 97165 OT EVAL LOW COMPLEX 30 MIN: CPT

## 2020-01-17 PROCEDURE — 97161 PT EVAL LOW COMPLEX 20 MIN: CPT

## 2020-01-17 PROCEDURE — 94761 N-INVAS EAR/PLS OXIMETRY MLT: CPT

## 2020-01-17 PROCEDURE — 6360000002 HC RX W HCPCS: Performed by: UROLOGY

## 2020-01-17 PROCEDURE — 97116 GAIT TRAINING THERAPY: CPT

## 2020-01-17 PROCEDURE — 85025 COMPLETE CBC W/AUTO DIFF WBC: CPT

## 2020-01-17 PROCEDURE — 97535 SELF CARE MNGMENT TRAINING: CPT

## 2020-01-17 PROCEDURE — 2580000003 HC RX 258: Performed by: UROLOGY

## 2020-01-17 RX ORDER — HYDRALAZINE HYDROCHLORIDE 25 MG/1
25 TABLET, FILM COATED ORAL EVERY 6 HOURS SCHEDULED
Status: DISCONTINUED | OUTPATIENT
Start: 2020-01-17 | End: 2020-01-19 | Stop reason: HOSPADM

## 2020-01-17 RX ADMIN — PREDNISONE 40 MG: 20 TABLET ORAL at 10:00

## 2020-01-17 RX ADMIN — VENLAFAXINE 75 MG: 37.5 TABLET ORAL at 20:19

## 2020-01-17 RX ADMIN — Medication 10 ML: at 20:20

## 2020-01-17 RX ADMIN — HYDRALAZINE HYDROCHLORIDE 25 MG: 25 TABLET, FILM COATED ORAL at 17:03

## 2020-01-17 RX ADMIN — IPRATROPIUM BROMIDE AND ALBUTEROL SULFATE 1 AMPULE: .5; 3 SOLUTION RESPIRATORY (INHALATION) at 19:27

## 2020-01-17 RX ADMIN — Medication 2 PUFF: at 08:14

## 2020-01-17 RX ADMIN — IPRATROPIUM BROMIDE AND ALBUTEROL SULFATE 1 AMPULE: .5; 3 SOLUTION RESPIRATORY (INHALATION) at 16:06

## 2020-01-17 RX ADMIN — HYDRALAZINE HYDROCHLORIDE 10 MG: 10 TABLET, FILM COATED ORAL at 05:40

## 2020-01-17 RX ADMIN — HEPARIN SODIUM 5000 UNITS: 5000 INJECTION INTRAVENOUS; SUBCUTANEOUS at 22:55

## 2020-01-17 RX ADMIN — Medication 10 ML: at 10:01

## 2020-01-17 RX ADMIN — VENLAFAXINE 75 MG: 37.5 TABLET ORAL at 10:00

## 2020-01-17 RX ADMIN — HYDRALAZINE HYDROCHLORIDE 25 MG: 25 TABLET, FILM COATED ORAL at 22:54

## 2020-01-17 RX ADMIN — IPRATROPIUM BROMIDE AND ALBUTEROL SULFATE 1 AMPULE: .5; 3 SOLUTION RESPIRATORY (INHALATION) at 11:01

## 2020-01-17 RX ADMIN — IPRATROPIUM BROMIDE AND ALBUTEROL SULFATE 1 AMPULE: .5; 3 SOLUTION RESPIRATORY (INHALATION) at 08:14

## 2020-01-17 RX ADMIN — Medication 2 PUFF: at 19:27

## 2020-01-17 RX ADMIN — METOPROLOL SUCCINATE 25 MG: 25 TABLET, FILM COATED, EXTENDED RELEASE ORAL at 10:00

## 2020-01-17 ASSESSMENT — PULMONARY FUNCTION TESTS
PEFR_L/MIN: 160
PEFR_L/MIN: 165

## 2020-01-17 NOTE — PROGRESS NOTES
11/01/19 124 lb (56.2 kg)   09/24/19 124 lb (56.2 kg)       General appearance:  Appears comfortable  Eyes: Sclera clear. Pupils equal.  ENT: Moist oral mucosa. Trachea midline, no adenopathy. Cardiovascular: Regular rhythm, normal S1, S2. No murmur. No edema in lower extremities  Respiratory: noted for wheezing and some tachypnea when asked to exert. No rales. No crackles cassandra crackles. GI: Abdomen soft, no tenderness, not distended, normal bowel sounds  Musculoskeletal: No cyanosis in digits, neck supple  Neurology: CN 2-12 grossly intact. No speech or motor deficits  Psych: Normal affect. Alert and oriented in time, place and person  Skin: Warm, dry, normal turgor    Labs and Tests:  CBC:   Recent Labs     01/15/20  0511 01/16/20  1015 01/17/20  0454   WBC 12.4* 10.7 6.4   HGB 10.9* 11.3* 11.1*    168 156     BMP:    Recent Labs     01/15/20  0512 01/16/20  1015 01/17/20  0454    138 137   K 4.0 4.1 5.1    109 108   CO2 16* 17* 17*   BUN 51* 52* 50*   CREATININE 2.3* 2.0* 2.0*   GLUCOSE 141* 109* 114*     Hepatic:   No results for input(s): AST, ALT, ALB, BILITOT, ALKPHOS in the last 72 hours. Discussed care with family and patient             Spent 30  minutes with patient and family at bedside and on unit reviewing medical records and labs, spent greater than 50% time counseling patient and family on diagnosis and plan   Problem List  Active Problems:    Acute asthma exacerbation  Resolved Problems:    * No resolved hospital problems. *       Assessment & Plan:   1. Acute COPD exacerbation  -Continues to improve. Stable continue p.o. steroids and DuoNeb    Acute on chronic renal failure  -With hydronephrosis  -Creatinine is trending down.  -Patient underwent cystoscopy due to hydronephrosis stent was removed was unable to place another stent.   For now monitor and possible nephrostomy tube if warranted await urology recommendation       Diet: Diet NPO, After Midnight  Code:Full Code  DVT PPX lovenox       Alba Hendricks MD   1/17/2020 10:43 AM

## 2020-01-17 NOTE — PROGRESS NOTES
Total knee arthroplasty (Left); Femur fracture surgery (Left, [id number]); Cystoscopy (04/29/2015); Colonoscopy; Abdomen surgery; other surgical history (Left, 10/24/2016); other surgical history (03/22/2017); and Cystoscopy (Right, 1/16/2020). Restrictions  Restrictions/Precautions  Restrictions/Precautions: Fall Risk, Contact Precautions, Modified Diet(Medium fall risk; NPO)  Required Braces or Orthoses?: No  Position Activity Restriction  Other position/activity restrictions: Liliana Hagen is a 66 y.o. female that presents having awoken from sleep with shortness of breath. Patient states that it is not abnormal for her to wake from sleep with shortness of breath however this evening was abnormal because it was persistent and associated with chest heaviness briefly. Patient received breathing treatment by squad and on arrival she states she is beginning to feel better. She does not wear home oxygen. No cardiac history in the past.  Known asthma. No recent fevers chills coryza or flulike symptoms. No admission for COPD or prednisone in the last 6 to 12 months.   Patient continues to smoke  Vision/Hearing  Vision: Impaired  Vision Exceptions: Wears glasses for reading  Hearing: Within functional limits     Subjective  General  Chart Reviewed: Yes  Response To Previous Treatment: Not applicable  Family / Caregiver Present: No  Diagnosis: Acute asthma exacerbation  Follows Commands: Within Functional Limits  General Comment  Comments: Pt supine in bed upon arrival  Subjective  Subjective: Pt agreeable to PT/OT eval.  Pain Screening  Patient Currently in Pain: Denies  Vital Signs  Patient Currently in Pain: Denies       Orientation  Orientation  Overall Orientation Status: Within Normal Limits  Social/Functional History  Social/Functional History  Lives With: Alone  Type of Home: Mobile home  Home Layout: One level  Home Access: Ramped entrance  Bathroom Shower/Tub: Tub/Shower unit, Walk-in shower  Bathroom Toilet: Standard  Bathroom Equipment: Shower chair  Bathroom Accessibility: Walker accessible  Home Equipment: Rolling walker, Cane  Receives Help From: Family  ADL Assistance: Independent  Homemaking Assistance: Independent  Ambulation Assistance: Independent  Transfer Assistance: Independent  Active : No(receives help from family for groceries)  Leisure & Hobbies: Reading, murali, sew, go out to eat  Additional Comments: No recent falls. Reports carefully getting up if she falls. Cognition        Objective     Observation/Palpation  Posture: Good    AROM RLE (degrees)  RLE AROM: WNL  AROM LLE (degrees)  LLE AROM : WNL  Strength RLE  Strength RLE: WNL  Strength LLE  Strength LLE: WNL  Tone RLE  RLE Tone: Normotonic  Tone LLE  LLE Tone: Normotonic  Motor Control  Gross Motor?: WFL  Sensation  Overall Sensation Status: WNL  Bed mobility  Supine to Sit: Modified independent  Scooting: Modified independent  Transfers  Sit to Stand: Supervision  Stand to sit: Supervision  Ambulation  Ambulation?: Yes  Ambulation 1  Surface: level tile  Device: No Device  Assistance: Supervision  Quality of Gait: Pt ambulates with decreased step length, slightly slowed aimee, no LOB. Distance: ~250 ft  Stairs/Curb  Stairs?: No     Balance  Posture: Good  Sitting - Static: Good  Sitting - Dynamic: Good  Standing - Static: Good;-  Standing - Dynamic: Good;-        Plan   Plan  Times per week: 1-2x  Times per day: Daily  Current Treatment Recommendations: Strengthening, ROM, Balance Training, Functional Mobility Training, Transfer Training, Endurance Training, Gait Training, Safety Education & Training, Patient/Caregiver Education & Training  Safety Devices  Type of devices:  All fall risk precautions in place, Call light within reach, Chair alarm in place, Gait belt, Patient at risk for falls, Left in chair, Nurse notified  Restraints  Initially in place: No    G-Code       OutComes Score AM-PAC Score  AM-PAC Inpatient Mobility Raw Score : 20 (01/17/20 1033)  AM-PAC Inpatient T-Scale Score : 47.67 (01/17/20 1033)  Mobility Inpatient CMS 0-100% Score: 35.83 (01/17/20 1033)  Mobility Inpatient CMS G-Code Modifier : CJ (01/17/20 1033)          Goals  Short term goals  Time Frame for Short term goals:  To be metp rior to discharge  Short term goal 1: Pt will complete sit to/from stand with mod I  Short term goal 2: Pt will ambulate 150 ft with no AD and mod I       Therapy Time   Individual Concurrent Group Co-treatment   Time In 0831         Time Out 0920         Minutes 49         Timed Code Treatment Minutes: [address], PT   Olvin Reeves, [address] [facility], DPT, [id number]

## 2020-01-17 NOTE — PROGRESS NOTES
Brantley Gang, MD Jolyne Mandes, MD Murleen Goldberg, MD               Office: [phone]                      Fax: [phone] INPATIENT PROGRESS NOTE:     PATIENT NAME: Neo Tesfaye  : 1945  MRN: [id number]  SERVICE DATE: 2020   SERVICE TIME: 12:41 PM       IMPRESSION:       SANAM (on CKD - 3 advancing):  Severe: Oligouric  - BL CKD (no previous renal out pt f/up reported) --> Scr ~ 1.4-1.5 --> to 1.8 in 2019 ---> 2.4 on admission  : Etiology of SANAM - presumed pre-renal for now, + hydronephrosis  - UA: hematuria, trace pro. +LE, WBC   - f/up UC x  : CKD-3 - h/o proteinuria , hematuria, needs work up (So I encouraged f/up)     COPD exacerbation  - needing nebs, IV steorids,   - active smoker     H/O   -Moderate degree of right hydronephrosis and hydroureter,  - h/o right double-J ureteral stent , h/o bladder cancer?   - h/o left nephrectomy (she reported due to \"cancer\" )    -  renal US  --> Moderate to severe right hydronephrosis. - CT a/p w/o IVC: 1/15: Marked right hydronephrosis, slightly greater as compared to prior. Persistent right hydroureter. Helena Cedillo the presence of right nephroureteral  stent, functionality of the stent is questioned. - appreciated assistant by urology    - s/o RT stent removal, as failed excahnged,        H/O gastric bypass surgery  H/O medical non-compliance .   Active smoker   [place]        RECOMMENDATIONS:    - f/up w/ urology re: ?plan for NST vs reattempt of stent placement    - stop IVF now as worsening HTN and SANAM not improved   - added hydralzine for HTN uncontrolled, but keep ~ 130-140s for now   - increase dose today   - f/up UCx  - continue to monitor acidosis - should stabalize   - needs strict I/Os       Associated problems:   - acidosis- non-AGMA - likely d/to CKD - f/up for now   - Anemia: mild so f/up      Other problems:  Patient Active Problem List   Diagnosis for Pain. nicotine (NICODERM CQ) 14 MG/24HR, Place 1 patch onto the skin daily     Scheduled Meds:   hydrALAZINE  10 mg Oral 4 times per day    metoprolol succinate  25 mg Oral Daily    mometasone-formoterol  2 puff Inhalation BID    venlafaxine  75 mg Oral BID    sodium chloride flush  10 mL Intravenous 2 times per day    ipratropium-albuterol  1 ampule Inhalation Q4H WA    predniSONE  40 mg Oral Daily    heparin (porcine)  5,000 Units Subcutaneous 3 times per day    influenza virus vaccine  0.5 mL Intramuscular Prior to discharge    nicotine  1 patch Transdermal Daily     Continuous Infusions:   lactated ringers 125 mL/hr at 01/16/20 1846     PRN Meds:.acetaminophen, sodium chloride flush, magnesium hydroxide, ondansetron, albuterol      Allergies reviewed by me: Aspirin; Ibuprofen; Adhesive tape; Hydrochlorothiazide; Microderm base [albolene]; Nsaids; Voltaren [diclofenac sodium]; and Procardia [nifedipine]    REVIEW OF SYSTEMS:  As mentioned in chief complaint and HPI , subjective   All other 10-point review of systems: negative. PHYSICAL EXAM:  Recent vital signs and recent I/Os reviewed by me.    Patient Vitals for the past 24 hrs:   BP Temp Temp src Pulse Resp SpO2 Weight   01/17/20 0830 (!) 185/64 97.9 °F (36.6 °C) -- 65 18 98 % --   01/17/20 0817 -- -- -- -- -- 98 % --   01/17/20 0425 (!) 165/72 99 °F (37.2 °C) Temporal 67 15 94 % 133 lb 9.6 oz (60.6 kg)   01/17/20 0050 (!) 152/69 98.1 °F (36.7 °C) Temporal 75 15 97 % --   01/16/20 2003 (!) 156/77 98.2 °F (36.8 °C) Temporal 68 15 96 % --   01/16/20 1944 -- -- -- -- 18 94 % --   01/16/20 1830 (!) 153/70 97.9 °F (36.6 °C) Temporal 74 14 99 % --   01/16/20 1820 -- -- -- 64 17 100 % --   01/16/20 1815 (!) 117/45 -- -- 64 20 100 % --   01/16/20 1810 (!) 113/43 -- -- 64 18 99 % --   01/16/20 1805 (!) 112/42 -- -- 64 22 99 % --   01/16/20 1800 (!) 99/42 97.9 °F (36.6 °C) Temporal 71 11 99 % --   01/16/20 1556 -- -- -- -- 16 97 % --   01/16/20 1430 (!) 169/70 98.4 °F (36.9 °C) Temporal 68 18 97 % --       Intake/Output Summary (Last 24 hours) at 1/17/2020 1241  Last data filed at 1/17/2020 1001  Gross per 24 hour   Intake 1970 ml   Output --   Net 1970 ml       Physical Exam  Vitals signs reviewed. Constitutional:       General: She is not in acute distress. Appearance: She is cachectic. She is ill-appearing. HENT:      Head: Normocephalic and atraumatic. Right Ear: External ear normal.      Left Ear: External ear normal.      Nose: Nose normal.      Mouth/Throat:      Mouth: Mucous membranes are not dry. Eyes:      General: No scleral icterus. Conjunctiva/sclera: Conjunctivae normal.   Neck:      Musculoskeletal: Normal range of motion and neck supple. Vascular: No JVD. Cardiovascular:      Rate and Rhythm: Normal rate and regular rhythm. Heart sounds: S1 normal and S2 normal.   Pulmonary:      Effort: Respiratory distress (mild) present. Breath sounds: Wheezing present. Abdominal:      General: Bowel sounds are normal.      Palpations: Abdomen is soft. Musculoskeletal:         General: No swelling or deformity. Skin:     General: Skin is warm and dry. Neurological:      Mental Status: She is alert and oriented to person, place, and time. Mental status is at baseline.    Psychiatric:         Mood and Affect: Mood normal.         Behavior: Behavior normal.              DATA:  Diagnostic tests reviewed by me for today's visit:    Recent Labs     01/15/20  0511 01/16/20  1015 01/17/20  0454   WBC 12.4* 10.7 6.4   HCT 33.8* 34.8* 34.0*    168 156     Iron Saturation:  No components found for: PERCENTFE  FERRITIN:  No results found for: FERRITIN  IRON:  No results found for: IRON  TIBC:  No results found for: TIBC    Recent Labs     01/15/20  0512 01/16/20  1015 01/17/20  0454    138 137   K 4.0 4.1 5.1    109 108   CO2 16* 17* 17*   BUN 51* 52* 50*   CREATININE 2.3* 2.0* 2.0*     Recent Labs 01/15/20  0512 01/16/20  1015 01/17/20  0454   CALCIUM 8.8 8.5 8.2*   PHOS  --  4.3 4.3     No results for input(s): PH, PCO2, PO2 in the last 72 hours.     Invalid input(s): Amena Bel    ABG:  No results found for: PH, PCO2, PO2, HCO3, BE, THGB, TCO2, O2SAT  VBG:  No results found for: PHVEN, CWS3TSQ, BEVEN, O5KXQLDB    LDH:  No results found for: LDH  Uric Acid:  No results found for: LABURIC, URICACID    PT/INR:    Lab Results   Component Value Date    PROTIME 11.5 11/01/2019    INR 1.01 11/01/2019     Warfarin PT/INR:  No components found for: Maurice Counter  PTT:    Lab Results   Component Value Date    APTT 29.1 11/01/2019   [APTT}  Last 3 Troponin:    Lab Results   Component Value Date    TROPONINI 0.03 01/14/2020    TROPONINI <0.01 11/01/2019    TROPONINI <0.01 11/01/2019       U/A:    Lab Results   Component Value Date    COLORU YELLOW 01/14/2020    PROTEINU TRACE 01/14/2020    PHUR 5.5 01/14/2020    WBCUA 15 01/14/2020    RBCUA 5-10 01/14/2020    YEAST none 08/12/2016    BACTERIA none 08/12/2016    CLARITYU CLOUDY 01/14/2020    SPECGRAV 1.016 01/14/2020    LEUKOCYTESUR MODERATE 01/14/2020    UROBILINOGEN 0.2 01/14/2020    BILIRUBINUR Negative 01/14/2020    BLOODU MODERATE 01/14/2020    GLUCOSEU Negative 01/14/2020     Microalbumen/Creatinine ratio:  No components found for: RUCREAT  24 Hour Urine for Protein:  No components found for: RAWUPRO, UHRS3, QOTY64RC, UTV3  24 Hour Urine for Creatinine Clearance:  No components found for: CREAT4, UHRS10, UTV10  Urine Toxicology:  No components found for: Mike Nissen, IBENZO, ICOCAINE, IMARTHC, IOPIATES, IPHENCYC    HgBA1c:    Lab Results   Component Value Date    LABA1C 5.1 03/07/2018     RPR:  No results found for: RPR  HIV:  No results found for: HIV  MEHUL:    Lab Results   Component Value Date    MEHUL Negative 08/10/2011     RF:  No results found for: RF  DSDNA:  No components found for: DNA  AMYLASE:    Lab Results   Component Value Date AMYLASE 36 02/09/2010     LIPASE:  No results found for: LIPASE  Fibrinogen Level:  No components found for: FIB       BELOW MENTIONED RADIOLOGY STUDY RESULTS BY ME:    Xr Chest Standard (2 Vw)    Result Date: 1/14/2020  EXAMINATION: TWO XRAY VIEWS OF THE CHEST 1/14/2020 3:23 am COMPARISON: 11/01/2019. HISTORY: ORDERING SYSTEM PROVIDED HISTORY: ? PNA TECHNOLOGIST PROVIDED HISTORY: Reason for exam:->? PNA Initial evaluation. FINDINGS: The cardiac silhouette appears enlarged. No focal consolidation. Hyperinflation of the lungs bilaterally. There is no pleural effusion or pneumothorax. The osseous structures are markedly osteopenic, which limits evaluation. 1. Enlarged cardiac silhouette. 2. Otherwise, no convincing acute cardiopulmonary abnormality. 3. Hyperinflation of the lungs bilaterally, which can be seen with COPD. Narrative   EXAMINATION:   RETROPERITONEAL ULTRASOUND OF THE KIDNEYS AND URINARY BLADDER       1/14/2020       COMPARISON:   10/20/2017 CT chest, April 2015 renal ultrasound       HISTORY:   ORDERING SYSTEM PROVIDED HISTORY: joe, h/o hydronephrosis, needing stent.    TECHNOLOGIST PROVIDED HISTORY:   Reason for exam:->joe, h/o hydronephrosis, needing stent.       History of left nephrectomy       FINDINGS:       Kidneys:       The right kidney measures 12.0 cm in length.  Moderate to severe right   hydronephrosis is evident.  No calcified shadowing renal pelvic stones are   identified.       The left kidney is surgically absent.           Bladder:       The urinary bladder is mostly collapsed.  Ureteral jets were not visualized.           Impression   Moderate to severe right hydronephrosis.       Status post left nephrectomy.

## 2020-01-17 NOTE — PROGRESS NOTES
the stent is questioned. Several small foci of subcutaneous soft tissue emphysema at the anterior abdominal wall, which can be due to recent medication injection, trauma, or infection. Correlate clinically. Xr Chest Standard (2 Vw)    Result Date: 1/14/2020  EXAMINATION: TWO XRAY VIEWS OF THE CHEST 1/14/2020 3:23 am COMPARISON: 11/01/2019. HISTORY: ORDERING SYSTEM PROVIDED HISTORY: ? PNA TECHNOLOGIST PROVIDED HISTORY: Reason for exam:->? PNA Initial evaluation. FINDINGS: The cardiac silhouette appears enlarged. No focal consolidation. Hyperinflation of the lungs bilaterally. There is no pleural effusion or pneumothorax. The osseous structures are markedly osteopenic, which limits evaluation. 1. Enlarged cardiac silhouette. 2. Otherwise, no convincing acute cardiopulmonary abnormality. 3. Hyperinflation of the lungs bilaterally, which can be seen with COPD. Us Renal Complete    Result Date: 1/14/2020  EXAMINATION: RETROPERITONEAL ULTRASOUND OF THE KIDNEYS AND URINARY BLADDER 1/14/2020 COMPARISON: 10/20/2017 CT chest, April 2015 renal ultrasound HISTORY: ORDERING SYSTEM PROVIDED HISTORY: joe, h/o hydronephrosis, needing stent. TECHNOLOGIST PROVIDED HISTORY: Reason for exam:->joe, h/o hydronephrosis, needing stent. History of left nephrectomy FINDINGS: Kidneys: The right kidney measures 12.0 cm in length. Moderate to severe right hydronephrosis is evident. No calcified shadowing renal pelvic stones are identified. The left kidney is surgically absent. Bladder: The urinary bladder is mostly collapsed. Ureteral jets were not visualized. Moderate to severe right hydronephrosis. Status post left nephrectomy. Fl Retrograde Pyelogram Right    Result Date: 1/16/2020  EXAMINATION: SPOT FLUOROSCOPIC IMAGES 1/16/2020 2:39 pm TECHNIQUE: Fluoroscopy was provided by the radiology department for procedure. Radiologist was not present during examination.  FLUOROSCOPY DOSE AND TYPE OR TIME AND EXPOSURES: 0.3

## 2020-01-17 NOTE — PROGRESS NOTES
accessible  Home Equipment: Rolling walker, [employer] Help From: Family  ADL Assistance: Independent  Homemaking Assistance: Independent  Ambulation Assistance: Independent  Transfer Assistance: Independent  Active : No(receives help from family for groceries)  Leisure & Hobbies: Reading, murali, sew, go out to eat  Additional Comments: No recent falls. Reports carefully getting up if she falls.        Objective   Vision: Impaired  Vision Exceptions: Wears glasses for reading  Hearing: Within functional limits    Orientation  Overall Orientation Status: Within Normal Limits  Observation/Palpation  Posture: Good  Balance  Sitting Balance: Supervision  Standing Balance: Supervision  Functional Mobility  Activity: To/from bathroom  Assist Level: Supervision  Functional Mobility Comments: Ambulated to bathroom sink to use mouthwash and wash hands  ADL  Grooming: Supervision;Setup(Used mouthwash at sink with assist to remove cap; washed hands at sink with soap and paper towels.)  UE Dressing: Supervision(Donned gown backward)  LE Dressing: Supervision(Donned/doffed socks EOB)  Tone RUE  RUE Tone: Normotonic  Tone LUE  LUE Tone: Normotonic  Coordination  Movements Are Fluid And Coordinated: Yes     Bed mobility  Supine to Sit: Modified independent  Scooting: Modified independent  Transfers  Sit to stand: Supervision  Stand to sit: Supervision  Vision - Basic Assessment  Prior Vision: Wears glasses only for reading  Cognition  Overall Cognitive Status: WFL        Sensation  Overall Sensation Status: WFL        LUE AROM (degrees)  LUE AROM : WFL  RUE AROM (degrees)  RUE AROM : WFL  LUE Strength  Gross LUE Strength: WFL  RUE Strength  Gross RUE Strength: WFL         Plan   Plan  Times per week: Eval only      AM-PAC Score        AM-PAC Inpatient Daily Activity Raw Score: 23 (01/17/20 1042)  AM-PAC Inpatient ADL T-Scale Score : 51.12 (01/17/20 1042)  ADL Inpatient CMS 0-100% Score: 15.86 (01/17/20 1042)  ADL Inpatient CMS G-Code Modifier : CI (01/17/20 1042)    Goals  Short term goals  Time Frame for Short term goals: No goals set       Therapy Time   Individual Concurrent Group Co-treatment   Time In 0831         Time Out 0920         Minutes 49              Timed Code Treatment Minutes:  34 Minutes    Total Treatment Minutes:  Abbie Walter [address], [place]   I have reviewed and agree with this treatment session.     Regis Arita, OTR/L [id number]

## 2020-01-18 LAB
ALBUMIN SERPL-MCNC: 3.3 G/DL (ref 3.4–5)
ANION GAP SERPL CALCULATED.3IONS-SCNC: 8 MMOL/L (ref 3–16)
BASOPHILS ABSOLUTE: 0 K/UL (ref 0–0.2)
BASOPHILS RELATIVE PERCENT: 0.2 %
BUN BLDV-MCNC: 47 MG/DL (ref 7–20)
CALCIUM SERPL-MCNC: 8.1 MG/DL (ref 8.3–10.6)
CHLORIDE BLD-SCNC: 107 MMOL/L (ref 99–110)
CO2: 20 MMOL/L (ref 21–32)
CREAT SERPL-MCNC: 1.9 MG/DL (ref 0.6–1.2)
EOSINOPHILS ABSOLUTE: 0 K/UL (ref 0–0.6)
EOSINOPHILS RELATIVE PERCENT: 0.1 %
GFR AFRICAN AMERICAN: 31
GFR NON-AFRICAN AMERICAN: 26
GLUCOSE BLD-MCNC: 97 MG/DL (ref 70–99)
HCT VFR BLD CALC: 36.2 % (ref 36–48)
HEMOGLOBIN: 11.9 G/DL (ref 12–16)
LYMPHOCYTES ABSOLUTE: 1.5 K/UL (ref 1–5.1)
LYMPHOCYTES RELATIVE PERCENT: 17.4 %
MCH RBC QN AUTO: 31.5 PG (ref 26–34)
MCHC RBC AUTO-ENTMCNC: 32.8 G/DL (ref 31–36)
MCV RBC AUTO: 96 FL (ref 80–100)
MONOCYTES ABSOLUTE: 0.5 K/UL (ref 0–1.3)
MONOCYTES RELATIVE PERCENT: 6 %
NEUTROPHILS ABSOLUTE: 6.6 K/UL (ref 1.7–7.7)
NEUTROPHILS RELATIVE PERCENT: 76.3 %
PDW BLD-RTO: 13.6 % (ref 12.4–15.4)
PHOSPHORUS: 3.4 MG/DL (ref 2.5–4.9)
PLATELET # BLD: 165 K/UL (ref 135–450)
PMV BLD AUTO: 11.6 FL (ref 5–10.5)
POTASSIUM SERPL-SCNC: 4.2 MMOL/L (ref 3.5–5.1)
RBC # BLD: 3.77 M/UL (ref 4–5.2)
SODIUM BLD-SCNC: 135 MMOL/L (ref 136–145)
WBC # BLD: 8.7 K/UL (ref 4–11)

## 2020-01-18 PROCEDURE — 85025 COMPLETE CBC W/AUTO DIFF WBC: CPT

## 2020-01-18 PROCEDURE — 6370000000 HC RX 637 (ALT 250 FOR IP): Performed by: UROLOGY

## 2020-01-18 PROCEDURE — 36415 COLL VENOUS BLD VENIPUNCTURE: CPT

## 2020-01-18 PROCEDURE — 6360000002 HC RX W HCPCS: Performed by: UROLOGY

## 2020-01-18 PROCEDURE — 1200000000 HC SEMI PRIVATE

## 2020-01-18 PROCEDURE — 94761 N-INVAS EAR/PLS OXIMETRY MLT: CPT

## 2020-01-18 PROCEDURE — 94640 AIRWAY INHALATION TREATMENT: CPT

## 2020-01-18 PROCEDURE — 80069 RENAL FUNCTION PANEL: CPT

## 2020-01-18 PROCEDURE — 6370000000 HC RX 637 (ALT 250 FOR IP): Performed by: INTERNAL MEDICINE

## 2020-01-18 PROCEDURE — 2580000003 HC RX 258: Performed by: HOSPITALIST

## 2020-01-18 PROCEDURE — 2580000003 HC RX 258: Performed by: UROLOGY

## 2020-01-18 RX ORDER — SODIUM CHLORIDE, SODIUM LACTATE, POTASSIUM CHLORIDE, CALCIUM CHLORIDE 600; 310; 30; 20 MG/100ML; MG/100ML; MG/100ML; MG/100ML
INJECTION, SOLUTION INTRAVENOUS CONTINUOUS
Status: DISCONTINUED | OUTPATIENT
Start: 2020-01-18 | End: 2020-01-19 | Stop reason: HOSPADM

## 2020-01-18 RX ADMIN — HYDRALAZINE HYDROCHLORIDE 25 MG: 25 TABLET, FILM COATED ORAL at 23:28

## 2020-01-18 RX ADMIN — IPRATROPIUM BROMIDE AND ALBUTEROL SULFATE 1 AMPULE: .5; 3 SOLUTION RESPIRATORY (INHALATION) at 11:48

## 2020-01-18 RX ADMIN — HYDRALAZINE HYDROCHLORIDE 25 MG: 25 TABLET, FILM COATED ORAL at 18:00

## 2020-01-18 RX ADMIN — IPRATROPIUM BROMIDE AND ALBUTEROL SULFATE 1 AMPULE: .5; 3 SOLUTION RESPIRATORY (INHALATION) at 15:52

## 2020-01-18 RX ADMIN — HEPARIN SODIUM 5000 UNITS: 5000 INJECTION INTRAVENOUS; SUBCUTANEOUS at 14:25

## 2020-01-18 RX ADMIN — HEPARIN SODIUM 5000 UNITS: 5000 INJECTION INTRAVENOUS; SUBCUTANEOUS at 06:25

## 2020-01-18 RX ADMIN — Medication 2 PUFF: at 20:42

## 2020-01-18 RX ADMIN — PREDNISONE 40 MG: 20 TABLET ORAL at 08:49

## 2020-01-18 RX ADMIN — HEPARIN SODIUM 5000 UNITS: 5000 INJECTION INTRAVENOUS; SUBCUTANEOUS at 21:47

## 2020-01-18 RX ADMIN — IPRATROPIUM BROMIDE AND ALBUTEROL SULFATE 1 AMPULE: .5; 3 SOLUTION RESPIRATORY (INHALATION) at 20:42

## 2020-01-18 RX ADMIN — Medication 2 PUFF: at 07:13

## 2020-01-18 RX ADMIN — SODIUM CHLORIDE, POTASSIUM CHLORIDE, SODIUM LACTATE AND CALCIUM CHLORIDE: 600; 310; 30; 20 INJECTION, SOLUTION INTRAVENOUS at 11:32

## 2020-01-18 RX ADMIN — VENLAFAXINE 75 MG: 37.5 TABLET ORAL at 20:37

## 2020-01-18 RX ADMIN — Medication 10 ML: at 08:51

## 2020-01-18 RX ADMIN — Medication 10 ML: at 20:38

## 2020-01-18 RX ADMIN — HYDRALAZINE HYDROCHLORIDE 25 MG: 25 TABLET, FILM COATED ORAL at 06:25

## 2020-01-18 RX ADMIN — VENLAFAXINE 75 MG: 37.5 TABLET ORAL at 08:49

## 2020-01-18 RX ADMIN — IPRATROPIUM BROMIDE AND ALBUTEROL SULFATE 1 AMPULE: .5; 3 SOLUTION RESPIRATORY (INHALATION) at 07:13

## 2020-01-18 RX ADMIN — HYDRALAZINE HYDROCHLORIDE 25 MG: 25 TABLET, FILM COATED ORAL at 14:26

## 2020-01-18 ASSESSMENT — ENCOUNTER SYMPTOMS
BLOOD IN STOOL: 0
CONSTIPATION: 0
EYE REDNESS: 0
VOMITING: 0
NAUSEA: 0
ABDOMINAL PAIN: 0
PHOTOPHOBIA: 0
EYE DISCHARGE: 0
FACIAL SWELLING: 0
ABDOMINAL DISTENTION: 0
CHEST TIGHTNESS: 0
SHORTNESS OF BREATH: 0
COUGH: 0
DIARRHEA: 0

## 2020-01-18 ASSESSMENT — PAIN SCALES - GENERAL
PAINLEVEL_OUTOF10: 0

## 2020-01-18 ASSESSMENT — PULMONARY FUNCTION TESTS: PEFR_L/MIN: 220

## 2020-01-18 NOTE — PROGRESS NOTES
Negative for cold intolerance, heat intolerance and polyuria. Genitourinary: Negative for decreased urine volume, difficulty urinating, flank pain and hematuria. Musculoskeletal: Negative for joint swelling and neck pain. Neurological: Negative for dizziness, seizures, syncope, speech difficulty, light-headedness and headaches. Hematological: Does not bruise/bleed easily. Psychiatric/Behavioral: Negative for agitation, confusion and hallucinations. Objective:      /75   Pulse 66   Temp 98.1 °F (36.7 °C) (Temporal)   Resp 18   Ht 5' 6\" (1.676 m)   Wt 133 lb 9.6 oz (60.6 kg)   SpO2 95%   BMI 21.56 kg/m²     Wt Readings from Last 3 Encounters:   01/17/20 133 lb 9.6 oz (60.6 kg)   11/01/19 124 lb (56.2 kg)   09/24/19 124 lb (56.2 kg)       BP Readings from Last 3 Encounters:   01/18/20 129/75   01/16/20 (!) 95/49   11/01/19 (!) 143/43     Chest- clear  Heart-regular  Abd-soft  Ext- trace edema    Labs  Hemoglobin   Date Value Ref Range Status   01/17/2020 11.1 (L) 12.0 - 16.0 g/dL Final     Hematocrit   Date Value Ref Range Status   01/17/2020 34.0 (L) 36.0 - 48.0 % Final     WBC   Date Value Ref Range Status   01/17/2020 6.4 4.0 - 11.0 K/uL Final     Platelets   Date Value Ref Range Status   01/17/2020 156 135 - 450 K/uL Final     Lab Results   Component Value Date    CREATININE 2.0 (H) 01/17/2020    BUN 50 (H) 01/17/2020     01/17/2020    K 5.1 01/17/2020     01/17/2020    CO2 17 (L) 01/17/2020       Assessment/Plan:  SANAM (on CKD - 3 advancing):  Severe: Oligouric  - BL CKD (no previous renal out pt f/up reported) --> Scr ~ 1.4-1.5 --> to 1.8 in 2019 ---> 2.4 on admission, now dowin to 1.9.  : Etiology of SANAM - presumed pre-renal for now, + hydronephrosis  - UA: hematuria, trace pro.  +LE, WBC              - f/up UC x  : CKD-3 - h/o proteinuria , hematuria, needs work up (So I encouraged f/up)      COPD exacerbation  - needing nebs, po steorids,   - active smoker      H/O

## 2020-01-18 NOTE — PLAN OF CARE
Problem: Falls - Risk of:  Goal: Will remain free from falls  Description  Will remain free from falls  Outcome: Ongoing  Goal: Absence of physical injury  Description  Absence of physical injury  Outcome: Ongoing     Problem:  Activity Intolerance:  Goal: Ability to perform activities of daily living will improve  Description  Ability to perform activities of daily living will improve  Outcome: Ongoing     Problem: Tobacco Use:  Goal: Will participate in inpatient tobacco-use cessation counseling  Description  Will participate in inpatient tobacco-use cessation counseling  Outcome: Ongoing     Problem: Discharge Planning:  Goal: Discharged to appropriate level of care  Description  Discharged to appropriate level of care  Outcome: Ongoing     Problem: Airway Clearance - Ineffective:  Goal: Ability to maintain a clear airway will improve  Description  Ability to maintain a clear airway will improve  Outcome: Ongoing     Problem: Gas Exchange - Impaired:  Goal: Levels of oxygenation will improve  Description  Levels of oxygenation will improve  Outcome: Ongoing     Problem: Mood - Altered:  Goal: Emotional status will stabilize  Description  Emotional status will stabilize  Outcome: Ongoing     Problem: Tissue Perfusion - Renal, Altered:  Goal: Electrolytes within specified parameters  Description  Electrolytes within specified parameters  Outcome: Ongoing  Goal: Urine creatinine clearance will be within specified parameters  Description  Urine creatinine clearance will be within specified parameters  Outcome: Ongoing  Goal: Serum creatinine will be within specified parameters  Description  Serum creatinine will be within specified parameters  Outcome: Ongoing  Goal: Ability to achieve a balanced intake and output will improve  Description  Ability to achieve a balanced intake and output will improve  Outcome: Ongoing     Problem: Urinary Retention:  Goal: Urinary elimination within specified

## 2020-01-18 NOTE — PROGRESS NOTES
Urology Progress Note  [facility]    Provider: Nenita Mendieta MD Patient ID:  Admission Date: 2020 Name: Lokesh Corado Date: 2020 MRN: [id number]   Patient Location: [id number] : 1945  Attending: Jim Ventura MD Date of Service: 2020  PCP: No primary care provider on file. Diagnoses:  RIGHT sided hydronephrosis  Solitary kidney  Upper tract TCC  Acute renal failure    Assessment/Plan:  Renal function is slightly better at 1.9  She is making urine. She has no pain  Continue to monitor. No need for PCN or stent at this time. The patient had a chance to ask questions which were answered. she understands the above plan. Subjective:   Vida Morales is a 66 y.o. female. She was seen and examined this morning. Today we discussed monitoring her renal function. Objective:   Vitals:  Vitals:    20 0822   BP: (!) 108/48   Pulse: 75   Resp: 18   Temp: 98.2 °F (36.8 °C)   SpO2: 95%       Intake/Output Summary (Last 24 hours) at 2020 1131  Last data filed at 2020 0851  Gross per 24 hour   Intake 1261.25 ml   Output 1100 ml   Net 161.25 ml     Physical Exam:  Gen: Alert and oriented x3, no acute distress  CV: Regular rate   Resp: unlabored respirations  Abd: Soft, non-distended, non-tender, no masses  Ext: no peripheral edema noted, moves upper and lower extremities spontaneously  Skin: warm and well perfused, no rashes noted on the face, or arms.      Labs:  Lab Results   Component Value Date    WBC 8.7 2020    HGB 11.9 (L) 2020    HCT 36.2 2020    MCV 96.0 2020     2020     Lab Results   Component Value Date    CREATININE 1.9 (H) 2020    BUN 47 (H) 2020     (L) 2020    K 4.2 2020     2020    CO2 20 (L) 2020       Nenita Mendieta MD  2020

## 2020-01-18 NOTE — FLOWSHEET NOTE
Vitals:    01/17/20 0830 01/17/20 1220 01/17/20 1607 01/17/20 1927   BP: (!) 185/64 (!) 173/70 (!) 170/52    Pulse: 65 65 66    Resp: 18 18 18 18   Temp: 97.9 °F (36.6 °C) 98.6 °F (37 °C) 97.7 °F (36.5 °C)    TempSrc: Temporal Temporal     SpO2: 98% 97% 98% 98%   Weight:       Height:            01/17/20 0830   Assessment   Charting Type Shift assessment   Neurological   Neuro (WDL) WDL   Level of Consciousness 0   Orientation Level Oriented X4   [place] Coma Scale   Eye Opening 4   Best Verbal Response 5   Best Motor Response 6   [place] Coma Scale Score 15   HEENT   HEENT (WDL) X   Right Eye Impaired vision  (corrected with glasses)   Left Eye Impaired vision  (corrected with glasses)   Teeth Dentures upper;Dentures lower   Respiratory   Respiratory (WDL) X   Respiratory Pattern Regular   Respiratory Depth Normal   Respiratory Quality/Effort Unlabored   Chest Assessment Chest expansion symmetrical   L Breath Sounds Clear;Diminished   R Breath Sounds Clear;Diminished   Breath Sounds   Right Upper Lobe Clear   Right Middle Lobe Diminished   Right Lower Lobe Diminished   Left Upper Lobe Clear   Left Lower Lobe Diminished   Cardiac   Cardiac (WDL) WDL   Rhythm Interpretation   Pulse 65   Cardiac Monitor   Telemetry Monitor On No   Gastrointestinal   Abdominal (WDL) WDL   Peripheral Vascular   Peripheral Vascular (WDL) WDL   Edema None   Skin Color/Condition   Skin Color/Condition (WDL) WDL   Skin Integrity   Skin Integrity (WDL) WDL   Musculoskeletal   Musculoskeletal (WDL) X   LL Extremity Full movement;Weakness   RL Extremity Full movement;Weakness   Genitourinary   Genitourinary (WDL) WDL   Flank Tenderness No   Suprapubic Tenderness No   Dysuria No   Urine Assessment   Incontinence No   Urine Color Colorless   Urine Appearance THEODORE   Urine Odor No odor   Anus/Rectum   Anus/Rectum (WDL) WDL   Psychosocial   Psychosocial (WDL) WDL

## 2020-01-18 NOTE — PROGRESS NOTES
[facility] PROGRESS NOTE    1/18/2020 11:28 AM        Name: Liliana Hagen . Admitted: 1/14/2020  Primary Care Provider: No primary care provider on file. (Tel: None)                        Subjective:  . No acute events overnight. Pt stil complains of sob and cough   Getting breathing treatment prns  Still whezzing and sob on exertion.    No fevers  States improving but still not at baseline  Reviewed interval ancillary notes    Current Medications  lactated ringers infusion, Continuous  hydrALAZINE (APRESOLINE) tablet 25 mg, 4 times per day  acetaminophen (TYLENOL) tablet 650 mg, Q6H PRN  metoprolol succinate (TOPROL XL) extended release tablet 25 mg, Daily  mometasone-formoterol (DULERA) 200-5 MCG/ACT inhaler 2 puff, BID  venlafaxine (EFFEXOR) tablet 75 mg, BID  sodium chloride flush 0.9 % injection 10 mL, 2 times per day  sodium chloride flush 0.9 % injection 10 mL, PRN  magnesium hydroxide (MILK OF MAGNESIA) 400 MG/5ML suspension 30 mL, Daily PRN  ondansetron (ZOFRAN) injection 4 mg, Q6H PRN  ipratropium-albuterol (DUONEB) nebulizer solution 1 ampule, Q4H WA  albuterol (PROVENTIL) nebulizer solution 2.5 mg, Q2H PRN  predniSONE (DELTASONE) tablet 40 mg, Daily  heparin (porcine) injection 5,000 Units, 3 times per day  influenza quadrivalent split vaccine (FLUZONE;FLUARIX;FLULAVAL;AFLURIA) injection 0.5 mL, Prior to discharge  nicotine (NICODERM CQ) 21 MG/24HR 1 patch, Daily        Objective:  BP (!) 108/48   Pulse 75   Temp 98.2 °F (36.8 °C) (Temporal)   Resp 18   Ht 5' 6\" (1.676 m)   Wt 132 lb 3.2 oz (60 kg)   SpO2 95%   BMI 21.34 kg/m²     Intake/Output Summary (Last 24 hours) at 1/18/2020 1128  Last data filed at 1/18/2020 0851  Gross per 24 hour   Intake 1261.25 ml   Output 1100 ml   Net 161.25 ml      Wt Readings from Last 3 Encounters:   01/18/20 132 lb 3.2 oz (60 kg)   11/01/19 124 lb (56.2 kg)   09/24/19 124 lb (56.2 kg)       General appearance:  Appears comfortable  Eyes: Sclera clear. Pupils equal.  ENT: Moist oral mucosa. Trachea midline, no adenopathy. Cardiovascular: Regular rhythm, normal S1, S2. No murmur. No edema in lower extremities  Respiratory: noted for wheezing and some tachypnea when asked to exert. No rales. No crackles cassandra crackles. GI: Abdomen soft, no tenderness, not distended, normal bowel sounds  Musculoskeletal: No cyanosis in digits, neck supple  Neurology: CN 2-12 grossly intact. No speech or motor deficits  Psych: Normal affect. Alert and oriented in time, place and person  Skin: Warm, dry, normal turgor    Labs and Tests:  CBC:   Recent Labs     01/16/20  1015 01/17/20  0454 01/18/20  0541   WBC 10.7 6.4 8.7   HGB 11.3* 11.1* 11.9*    156 165     BMP:    Recent Labs     01/16/20  1015 01/17/20  0454 01/18/20  0541    137 135*   K 4.1 5.1 4.2    108 107   CO2 17* 17* 20*   BUN 52* 50* 47*   CREATININE 2.0* 2.0* 1.9*   GLUCOSE 109* 114* 97     Hepatic:   No results for input(s): AST, ALT, ALB, BILITOT, ALKPHOS in the last 72 hours. Discussed care with family and patient             Spent 30  minutes with patient and family at bedside and on unit reviewing medical records and labs, spent greater than 50% time counseling patient and family on diagnosis and plan   Problem List  Active Problems:    Acute asthma exacerbation  Resolved Problems:    * No resolved hospital problems. *       Assessment & Plan:   1. Acute COPD exacerbation  -Continues to improve.   Stable continue p.o. steroids and DuoNeb  COPD with acute exacerbation confirmed and  ruled out Acute asthma exacerbation POA      Acute on chronic renal failure  -With hydronephrosis  -Creatinine is trending down.  -Patient underwent cystoscopy due to hydronephrosis stent was removed was unable to place another stent.   -If continues to worsen possible nephrostomy tube on Monday  At this time continue to monitor renal function         Diet: DIET RENAL;  Diet NPO, After Midnight Exceptions are: Sips with Meds  Code:Full Code  DVT PPX lovenox       Jennifer Beebe MD   1/18/2020 11:28 AM

## 2020-01-19 VITALS
TEMPERATURE: 97.1 F | HEART RATE: 79 BPM | WEIGHT: 131.4 LBS | DIASTOLIC BLOOD PRESSURE: 55 MMHG | SYSTOLIC BLOOD PRESSURE: 96 MMHG | HEIGHT: 66 IN | BODY MASS INDEX: 21.12 KG/M2 | OXYGEN SATURATION: 98 % | RESPIRATION RATE: 18 BRPM

## 2020-01-19 LAB
ALBUMIN SERPL-MCNC: 3 G/DL (ref 3.4–5)
ANION GAP SERPL CALCULATED.3IONS-SCNC: 10 MMOL/L (ref 3–16)
BASOPHILS ABSOLUTE: 0 K/UL (ref 0–0.2)
BASOPHILS RELATIVE PERCENT: 0.3 %
BUN BLDV-MCNC: 43 MG/DL (ref 7–20)
CALCIUM SERPL-MCNC: 7.9 MG/DL (ref 8.3–10.6)
CHLORIDE BLD-SCNC: 105 MMOL/L (ref 99–110)
CO2: 20 MMOL/L (ref 21–32)
CREAT SERPL-MCNC: 1.8 MG/DL (ref 0.6–1.2)
EOSINOPHILS ABSOLUTE: 0 K/UL (ref 0–0.6)
EOSINOPHILS RELATIVE PERCENT: 0.5 %
GFR AFRICAN AMERICAN: 33
GFR NON-AFRICAN AMERICAN: 27
GLUCOSE BLD-MCNC: 85 MG/DL (ref 70–99)
HCT VFR BLD CALC: 34.8 % (ref 36–48)
HEMOGLOBIN: 11.4 G/DL (ref 12–16)
LYMPHOCYTES ABSOLUTE: 1.5 K/UL (ref 1–5.1)
LYMPHOCYTES RELATIVE PERCENT: 23.5 %
MCH RBC QN AUTO: 31.1 PG (ref 26–34)
MCHC RBC AUTO-ENTMCNC: 32.7 G/DL (ref 31–36)
MCV RBC AUTO: 95.3 FL (ref 80–100)
MONOCYTES ABSOLUTE: 0.6 K/UL (ref 0–1.3)
MONOCYTES RELATIVE PERCENT: 8.4 %
NEUTROPHILS ABSOLUTE: 4.4 K/UL (ref 1.7–7.7)
NEUTROPHILS RELATIVE PERCENT: 67.3 %
PDW BLD-RTO: 13.7 % (ref 12.4–15.4)
PHOSPHORUS: 3.7 MG/DL (ref 2.5–4.9)
PLATELET # BLD: 150 K/UL (ref 135–450)
PMV BLD AUTO: 11.8 FL (ref 5–10.5)
POTASSIUM SERPL-SCNC: 3.9 MMOL/L (ref 3.5–5.1)
RBC # BLD: 3.65 M/UL (ref 4–5.2)
SODIUM BLD-SCNC: 135 MMOL/L (ref 136–145)
WBC # BLD: 6.6 K/UL (ref 4–11)

## 2020-01-19 PROCEDURE — 36415 COLL VENOUS BLD VENIPUNCTURE: CPT

## 2020-01-19 PROCEDURE — 85025 COMPLETE CBC W/AUTO DIFF WBC: CPT

## 2020-01-19 PROCEDURE — 6370000000 HC RX 637 (ALT 250 FOR IP): Performed by: UROLOGY

## 2020-01-19 PROCEDURE — 94761 N-INVAS EAR/PLS OXIMETRY MLT: CPT

## 2020-01-19 PROCEDURE — 6370000000 HC RX 637 (ALT 250 FOR IP): Performed by: INTERNAL MEDICINE

## 2020-01-19 PROCEDURE — 2580000003 HC RX 258: Performed by: HOSPITALIST

## 2020-01-19 PROCEDURE — 80069 RENAL FUNCTION PANEL: CPT

## 2020-01-19 PROCEDURE — 6360000002 HC RX W HCPCS: Performed by: UROLOGY

## 2020-01-19 PROCEDURE — 94640 AIRWAY INHALATION TREATMENT: CPT

## 2020-01-19 RX ORDER — PREDNISONE 20 MG/1
40 TABLET ORAL DAILY
Qty: 10 TABLET | Refills: 0 | Status: SHIPPED | OUTPATIENT
Start: 2020-01-20 | End: 2020-01-25

## 2020-01-19 RX ORDER — VENLAFAXINE 75 MG/1
75 TABLET ORAL 2 TIMES DAILY
Qty: 60 TABLET | Refills: 0
Start: 2020-01-19 | End: 2020-06-23 | Stop reason: ALTCHOICE

## 2020-01-19 RX ORDER — HYDRALAZINE HYDROCHLORIDE 25 MG/1
25 TABLET, FILM COATED ORAL 3 TIMES DAILY
Qty: 90 TABLET | Refills: 0 | Status: SHIPPED | OUTPATIENT
Start: 2020-01-19 | End: 2020-06-23

## 2020-01-19 RX ORDER — METOPROLOL SUCCINATE 25 MG/1
TABLET, EXTENDED RELEASE ORAL
Qty: 90 TABLET | Refills: 0 | Status: SHIPPED | OUTPATIENT
Start: 2020-01-19 | End: 2020-06-23 | Stop reason: ALTCHOICE

## 2020-01-19 RX ADMIN — HEPARIN SODIUM 5000 UNITS: 5000 INJECTION INTRAVENOUS; SUBCUTANEOUS at 05:49

## 2020-01-19 RX ADMIN — IPRATROPIUM BROMIDE AND ALBUTEROL SULFATE 1 AMPULE: .5; 3 SOLUTION RESPIRATORY (INHALATION) at 08:39

## 2020-01-19 RX ADMIN — HYDRALAZINE HYDROCHLORIDE 25 MG: 25 TABLET, FILM COATED ORAL at 05:50

## 2020-01-19 RX ADMIN — IPRATROPIUM BROMIDE AND ALBUTEROL SULFATE 1 AMPULE: .5; 3 SOLUTION RESPIRATORY (INHALATION) at 13:01

## 2020-01-19 RX ADMIN — VENLAFAXINE 75 MG: 37.5 TABLET ORAL at 09:34

## 2020-01-19 RX ADMIN — PREDNISONE 40 MG: 20 TABLET ORAL at 09:34

## 2020-01-19 RX ADMIN — SODIUM CHLORIDE, POTASSIUM CHLORIDE, SODIUM LACTATE AND CALCIUM CHLORIDE: 600; 310; 30; 20 INJECTION, SOLUTION INTRAVENOUS at 05:50

## 2020-01-19 RX ADMIN — Medication 2 PUFF: at 08:39

## 2020-01-19 ASSESSMENT — ENCOUNTER SYMPTOMS
COUGH: 0
DIARRHEA: 0
SHORTNESS OF BREATH: 0
EYE REDNESS: 0
ABDOMINAL PAIN: 0
CONSTIPATION: 0
PHOTOPHOBIA: 0
ABDOMINAL DISTENTION: 0
VOMITING: 0
CHEST TIGHTNESS: 0
NAUSEA: 0
EYE DISCHARGE: 0
FACIAL SWELLING: 0
BLOOD IN STOOL: 0

## 2020-01-19 ASSESSMENT — PULMONARY FUNCTION TESTS: PEFR_L/MIN: 210

## 2020-01-19 ASSESSMENT — PAIN SCALES - GENERAL
PAINLEVEL_OUTOF10: 0
PAINLEVEL_OUTOF10: 0

## 2020-01-19 NOTE — PROGRESS NOTES
Shift assessment complete. Evening medications given, see MAR for details. Pt refuses bed alarm. Non-skid socks on. Pt denies any pain or needs at this time. Pt resting in bed with no signs of distress. Will continue to monitor. Call light within reach.

## 2020-01-19 NOTE — DISCHARGE INSTR - COC
Continuity of Care Form    Patient Name: Steven Gomes   :  1945  MRN:  [id number]    Admit date:  2020  Discharge date:  2020    Code Status Order: Full Code   Advance Directives:   [facility] Documentation     Date/Time Healthcare Directive Type of Healthcare Directive Copy in [facility] [address] Agent's Name Healthcare Agent's Phone Number    20 1432  No, patient does not have an advance directive for healthcare treatment -- -- -- -- --    20 1518  No, patient does not have an advance directive for healthcare treatment -- -- -- -- --          Admitting Physician:  Lore Naylor MD  PCP: No primary care provider on file.     Discharging Nurse: Michelle Morales RN  [address] Unit/Room#: [id number]-8277/1025-39  Discharging Unit Phone Number: [phone]    Emergency Contact:   Extended Emergency Contact Information  Primary Emergency Contact: [address] [place] [address] Phone: [phone]  Work Phone: [phone]  Relation: Child  Secondary Emergency Contact: [facility] Phone: [phone]  Mobile Phone: [phone]  Relation: Child    Past Surgical History:  Past Surgical History:   Procedure Laterality Date    ABDOMEN SURGERY      BLADDER REMOVAL      partial    CHOLECYSTECTOMY      COLONOSCOPY      CYSTOSCOPY  4-7-10    fulgeration of bladder tumor    CYSTOSCOPY  7/14/10    Cytology    CYSTOSCOPY  12/15/10    CYSTOSCOPY  3/2/11    COLLAGEN INJECTION    CYSTOSCOPY  6-    cytology    CYSTOSCOPY  10/19/11    CYSTOSCOPY  12    CYSTOSCOPY  10/8/12    cytology    CYSTOSCOPY  14    cysto/cytology    CYSTOSCOPY  2015    Cytology study    CYSTOSCOPY Right 2020    CYSTOSCOPY RIGHT URETERAL STENT REMOVAL, RIGHT RETROGRADE PYELOGRAM performed by Chava Kelley MD at [address] [place] [address]      cataract left eye    FEMUR FRACTURE SURGERY Left [id number]    GASTRIC BYPASS SURGERY      JOINT REPLACEMENT      left knee    OTHER SURGICAL HISTORY Left 10/24/2016    left nephroureterectomy    OTHER SURGICAL HISTORY  03/22/2017    RIGHT ureteral stent placement, RIGHT nephrostomy drain placement     TOTAL KNEE ARTHROPLASTY Left        Immunization History:   Immunization History   Administered Date(s) Administered    Influenza Virus Vaccine 11/08/2010, 10/03/2012    Influenza Whole 10/01/2011    Influenza, High Dose (Fluzone 65 yrs and older) 09/20/2013, 12/30/2014    Influenza, Fidelia Valarie, 6 mo and older, IM, PF (Flulaval, Fluarix) 03/23/2019    Pneumococcal Conjugate 13-valent (Luhjdii34) 02/25/2016    Pneumococcal Conjugate 7-valent (Prevnar7) 10/01/2005    Pneumococcal Polysaccharide ([id number]) 09/20/2013    Td, unspecified formulation 10/04/2006       Active Problems:  Patient Active Problem List   Diagnosis Code    Asthma J45.909    Bipolar I disorder, single manic episode ([facility] F30.9    Chronic airway obstruction, not elsewhere classified J44.9    Recurrent major depressive disorder, in remission ([facility] F33.40    Diabetes mellitus type 2 in nonobese ([facility] E11.9    Tobacco use disorder F17.200    Essential hypertension I10    Osteoarthrosis involving lower leg M17.10    History of primary bladder cancer ( Dr Luke Kaur) Z85.51    Venous insufficiency I87.2    Dermatosclerosis ([facility] M34.9    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    Chronic constipation K59.09    COPD exacerbation ([facility]) J44.1    Acute asthma exacerbation J45.901       Isolation/Infection:   Isolation          No Isolation        Patient Infection Status     None to display          Nurse Assessment:  Last Vital Signs: BP (!) 96/55   Pulse 79   Temp 97.1 °F (36.2 °C) (Temporal)   Resp 18   Ht 5' 6\" (1.676 m)   Wt 131 lb 6.4 oz (59.6 kg)   SpO2 98%   BMI 21.21 kg/m²     Last documented pain score (0-10 scale): Pain Level: 0  Last Weight:   Wt Readings · Address:  · Phone:  · Fax:    Dialysis Facility (if applicable)   · Name:  · Address:  · Dialysis Schedule:  · Phone:  · Fax:    / signature: {Esignature:[id number]}    PHYSICIAN SECTION    Prognosis: Good    Condition at Discharge: Stable    Rehab Potential (if transferring to Rehab): Good    Recommended Labs or Other Treatments After Discharge:     Physician Certification: I certify the above information and transfer of Stevie Carmichael  is necessary for the continuing treatment of the diagnosis listed and that she requires Home Care for greater 30 days.      Update Admission H&P: No change in H&P    PHYSICIAN SIGNATURE:  Electronically signed by Chandler Goldmann, MD on 1/19/20 at 3:42 PM

## 2020-01-19 NOTE — CARE COORDINATION
Discharge Planning Assessment  RN/SW discharge planner met with patient/(and family member) to discuss reason for admission, current living situation, and potential needs at the time of discharge    Demographics/Insurance verified Yes - Medicare    Current type of dwelling: trailer with a ramp and handrails    Patient from ECF/SW confirmed with:n/a    Living arrangements: alone but has a daughter and son that live locally    Level of function/Support:independent with adl's. Children are supportive    PCP:Dr. Mary Gutierrez    Last Visit to PCP:a year ago    DME:can and walker but pt states she does not use them     Active with any community resources/agencies/skilled home care:States gets services through [facility] and has a lifeline and an aide    Medication compliance issues:none     Financial issues that could impact healthcare:none     Transportation at the time of discharge:children in private vehicle    Tentative discharge plan:home. CM will continue to follow for any needs.   Fabricio Massey RN, BSN  [phone]
1/19/20 at 3:55 PM

## 2020-01-19 NOTE — PROGRESS NOTES
[facility] Note    Patient Active Problem List   Diagnosis    Asthma    Bipolar I disorder, single manic episode ([facility]    Chronic airway obstruction, not elsewhere classified    Recurrent major depressive disorder, in remission ([facility]    Diabetes mellitus type 2 in nonobese ([facility]    Tobacco use disorder    Essential hypertension    Osteoarthrosis involving lower leg    History of primary bladder cancer ( Dr Yuli Batista)   Kelsey Ian Venous insufficiency    Dermatosclerosis ([facility]    Impaired glucose tolerance    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 (HCC)    Chronic constipation    COPD exacerbation (HCC)    Acute asthma exacerbation       Past Medical History:   has a past medical history of Arthritis, Asthma, Bipolar 1 disorder ([facility] Cancer ([facility] COPD (chronic obstructive pulmonary disease) ([facility] Depression, Diabetes mellitus ([facility] Hypertension, Neuropathy, Obesity, unspecified, Osteoarthrosis, unspecified whether generalized or localized, lower leg, PVD (peripheral vascular disease) ([facility] Tobacco use disorder, and Unspecified cataract. Past Social History:   reports that she has been smoking cigarettes. She has a 55.00 pack-year smoking history. She has never used smokeless tobacco. She reports previous alcohol use. She reports that she does not use drugs. Subjective:  No complaints. Review of Systems   Constitutional: Positive for fatigue. Negative for activity change, appetite change, chills, fever and unexpected weight change. HENT: Negative for congestion and facial swelling. Eyes: Negative for photophobia, discharge and redness. Respiratory: Negative for cough, chest tightness and shortness of breath. Cardiovascular: Negative for chest pain, palpitations and leg swelling. Gastrointestinal: Negative for abdominal distention, abdominal pain, blood in stool, constipation, diarrhea, nausea and vomiting.    Endocrine: Negative for cold intolerance, heat intolerance and polyuria. Genitourinary: Negative for decreased urine volume, difficulty urinating, flank pain and hematuria. Musculoskeletal: Negative for joint swelling and neck pain. Neurological: Negative for dizziness, seizures, syncope, speech difficulty, light-headedness and headaches. Hematological: Does not bruise/bleed easily. Psychiatric/Behavioral: Negative for agitation, confusion and hallucinations. Objective:      BP (!) 96/55   Pulse 79   Temp 97.1 °F (36.2 °C)   Resp 18   Ht 5' 6\" (1.676 m)   Wt 131 lb 6.4 oz (59.6 kg)   SpO2 98%   BMI 21.21 kg/m²     Wt Readings from Last 3 Encounters:   01/19/20 131 lb 6.4 oz (59.6 kg)   11/01/19 124 lb (56.2 kg)   09/24/19 124 lb (56.2 kg)       BP Readings from Last 3 Encounters:   01/19/20 (!) 96/55   01/16/20 (!) 95/49   11/01/19 (!) 143/43     Chest- clear  Heart-regular  Abd-soft  Ext- trace edema    Labs  Hemoglobin   Date Value Ref Range Status   01/19/2020 11.4 (L) 12.0 - 16.0 g/dL Final     Hematocrit   Date Value Ref Range Status   01/19/2020 34.8 (L) 36.0 - 48.0 % Final     WBC   Date Value Ref Range Status   01/19/2020 6.6 4.0 - 11.0 K/uL Final     Platelets   Date Value Ref Range Status   01/19/2020 150 135 - 450 K/uL Final     Lab Results   Component Value Date    CREATININE 1.8 (H) 01/19/2020    BUN 43 (H) 01/19/2020     (L) 01/19/2020    K 3.9 01/19/2020     01/19/2020    CO2 20 (L) 01/19/2020       Assessment/Plan:  SANAM (on CKD - 3 advancing):  Severe: Oligouric  - BL CKD (no previous renal out pt f/up reported) --> Scr ~ 1.4-1.5 --> to 1.8 in 2019 ---> 2.4 on admission, now dowin to 1.8.  : Etiology of SANAM - presumed pre-renal for now, + hydronephrosis  - UA: hematuria, trace pro.  +LE, WBC              - f/up UC x  : CKD-3 - h/o proteinuria , hematuria, needs work up (So I encouraged f/up)      COPD exacerbation  - needing nebs, po steorids,   - active smoker      H/O -Moderate degree of right hydronephrosis and hydroureter,  - h/o right double-J ureteral stent , h/o bladder cancer?   - h/o left nephrectomy (she reported due to \"cancer\" )     -  renal US 1/14 --> Moderate to severe right hydronephrosis. - CT a/p w/o IVC: 1/15: Marked right hydronephrosis, slightly greater as compared to prior. Persistent right hydroureter.  Given the presence of right nephroureteral  stent, functionality of the stent is questioned.                 - appreciated assistant by urology, they are following              - s/o RT stent removal, as failed exchange. No need for PCN or stent at this time.         H/O gastric bypass surgery  H/O medical non-compliance . Active smoker        RECOMMENDATIONS:    - f/up w/ urology re: No plan for NST vs reattempt of stent placement     - on slow LR  - added hydralzine for HTN uncontrolled, but keep ~ 130-140s for now              - increased dose, bp currently controlled. - continue to monitor acidosis   - needs strict I/Os    Stable from renal perspective. Would need outpt f/u.         Associated problems:   - acidosis- non-AGMA - likely d/to CKD - f/up for now. Better.    - Anemia: mild so f/up, stable     Other problems:      Patient Active Problem List   Diagnosis    Asthma    Bipolar I disorder, single manic episode ([address]    Chronic airway obstruction, not elsewhere classified    Recurrent major depressive disorder, in remission ([address]    Diabetes mellitus type 2 in nonobese ([address]    Tobacco use disorder    Essential hypertension    Osteoarthrosis involving lower leg    History of primary bladder cancer ( Dr Joaquim Sampson)   [place] Venous insufficiency    Dermatosclerosis ([address]    Impaired glucose tolerance    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

## 2020-01-21 NOTE — DISCHARGE SUMMARY
[facility] DISCHARGE SUMMARY    Patient Demographics    Patient. Radha Darden  Date of Birth. 1945  MRN. [id number]     Primary care provider. No primary care provider on file. (Tel: None)    Admit date: 1/14/2020    Discharge date (blank if same as Note Date): 1/19/2020  Note Date: 1/21/2020     Reason for Hospitalization. Chief Complaint   Patient presents with    [facility] Course. COPD  Patient admitted for acute sob secondary to COPD exab. Patient was treated with oxygen, duo nebs and iv steroid. Was weaned to baseline oxygen requirement and po steroids. discharged on po taper steroid. HDS at the time of discharge. N    Acute on chronic renal failure  -With hydronephrosis  -Creatinine is trending down.  -Patient underwent cystoscopy due to hydronephrosis stent was removed was unable to place another stent. she remained stable and did not stent placed   outpatient follow up with urology for further treatment plan    Consults. IP CONSULT TO HOSPITALIST  IP CONSULT TO PULMONOLOGY  IP CONSULT TO NEPHROLOGY  IP CONSULT TO SOCIAL WORK  IP CONSULT TO UROLOGY  IP CONSULT TO HOME CARE NEEDS    Physical examination on discharge day. BP (!) 96/55   Pulse 79   Temp 97.1 °F (36.2 °C) (Temporal)   Resp 18   Ht 5' 6\" (1.676 m)   Wt 131 lb 6.4 oz (59.6 kg)   SpO2 98%   BMI 21.21 kg/m²   General appearance. Alert. Looks comfortable. HEENT. Sclera clear. Moist mucus membranes. Cardiovascular. Regular rate and rhythm, normal S1, S2. No murmur. Respiratory. Not using accessory muscles. Clear to auscultation bilaterally, no wheeze. Gastrointestinal. Abdomen soft, non-tender, not distended, normal bowel sounds  Neurology. Facial symmetry. No speech deficits. Moving all extremities equally. Extremities. No edema in lower extremities. Skin.  Warm, dry, normal turgor    Condition at time of discharge stable     Medication instructions provided to patient at discharge. Medication List      START taking these medications    hydrALAZINE 25 MG tablet  Commonly known as:  APRESOLINE  Take 1 tablet by mouth 3 times daily  Notes to patient:  Use: treat heart failure, lower blood pressure, prevent and treat chest pain  Side effects: headache and dizziness     predniSONE 20 MG tablet  Commonly known as:  DELTASONE  Take 2 tablets by mouth daily for 5 days  Notes to patient:  Use: treat asthma and copd, inflammation  Side effects: upset stomach, high blood sugars, weight gain, mood changes, and increased chances of infection        CHANGE how you take these medications    mometasone-formoterol 200-5 MCG/ACT inhaler  Commonly known as:  Dulera  Inhale 2 puffs into the lungs every 12 hours Note to pharmacist... May substitute for comparable drug if this is not on formulary. .. Call if questions  [phone]  What changed:  Another medication with the same name was removed. Continue taking this medication, and follow the directions you see here. Notes to patient:  Use: to treat asthma or COPD  Side effects: runny nose, sore throat, headache, oral yeast infection        CONTINUE taking these medications    acetaminophen 500 MG tablet  Commonly known as:  TYLENOL  Notes to patient:  Use: Fever, mild pain  Avoid if history of liver issues     metoprolol succinate 25 MG extended release tablet  Commonly known as:  TOPROL XL  TAKE ONE TABLET BY MOUTH ONCE DAILY  Notes to patient:  Use:  Lowers blood pressure and heart rate, takes workload off heart  Side effects:  Tiredness, shortness of breath, trouble sleeping, impotence     nicotine 14 MG/24HR  Commonly known as:  NICODERM CQ  Place 1 patch onto the skin daily  Notes to patient:  REMOVE OLD PATCH BEFORE PLACING NEW PATCH  DO  [facility]-    Use: Treatment to aid smoking cessation  Side effects:  Insomnia, nightmares, rhinnitis     venlafaxine 75 MG tablet  Commonly known as:  EFFEXOR  Take 1 tablet by mouth 2 times daily  Notes to patient:  Use: Treatment of generalized anxiety disorder  Side effects: Insomnia, weight loss, lack of appetite, fatigue, constipation     vitamin B-12 1000 MCG extended release tablet  Take 1 tablet by mouth daily  Notes to patient:  Use: treatment of anemia and/or vitamin B12 deficiency  Side effects: dizziness, headache, anxiety, nausea        STOP taking these medications    albuterol (5 MG/ML) 0.5% nebulizer solution  Commonly known as:  PROVENTIL     albuterol sulfate  (90 Base) MCG/ACT inhaler  Commonly known as:  Proventil HFA     [facility] OP           Where to Get Your Medications      You can get these medications from any pharmacy    Bring a paper prescription for each of these medications  · hydrALAZINE 25 MG tablet  · metoprolol succinate 25 MG extended release tablet  · mometasone-formoterol 200-5 MCG/ACT inhaler  · predniSONE 20 MG tablet     Information about where to get these medications is not yet available    Ask your nurse or doctor about these medications  · venlafaxine 75 MG tablet         Discharge recommendations given to patient. Follow Up. pcp in 1 week   Disposition. home  Activity. activity as tolerated  Diet: No diet orders on file      Spent 45  minutes in discharge process.     Signed:  Heather Rubi MD     1/21/2020 9:19 AM

## 2020-03-05 ENCOUNTER — HOSPITAL ENCOUNTER (EMERGENCY)
Age: 75
Discharge: HOME | End: 2020-03-05
Attending: EMERGENCY MEDICINE
Payer: MEDICARE

## 2020-03-05 ENCOUNTER — APPOINTMENT (OUTPATIENT)
Dept: GENERAL RADIOLOGY | Age: 75
End: 2020-03-05
Payer: MEDICARE

## 2020-03-05 VITALS
TEMPERATURE: 97.9 F | OXYGEN SATURATION: 97 % | BODY MASS INDEX: 21.07 KG/M2 | WEIGHT: 131.13 LBS | HEART RATE: 81 BPM | RESPIRATION RATE: 18 BRPM | DIASTOLIC BLOOD PRESSURE: 40 MMHG | SYSTOLIC BLOOD PRESSURE: 110 MMHG | HEIGHT: 66 IN

## 2020-03-05 LAB
A/G RATIO: 1 (ref 1.1–2.2)
ALBUMIN SERPL-MCNC: 3.9 G/DL (ref 3.4–5)
ALP BLD-CCNC: 132 U/L (ref 40–129)
ALT SERPL-CCNC: 12 U/L (ref 10–40)
ANION GAP SERPL CALCULATED.3IONS-SCNC: 11 MMOL/L (ref 3–16)
AST SERPL-CCNC: 23 U/L (ref 15–37)
BASOPHILS ABSOLUTE: 0.1 K/UL (ref 0–0.2)
BASOPHILS RELATIVE PERCENT: 2.5 %
BILIRUB SERPL-MCNC: 0.4 MG/DL (ref 0–1)
BUN BLDV-MCNC: 26 MG/DL (ref 7–20)
CALCIUM SERPL-MCNC: 9 MG/DL (ref 8.3–10.6)
CHLORIDE BLD-SCNC: 105 MMOL/L (ref 99–110)
CO2: 24 MMOL/L (ref 21–32)
CREAT SERPL-MCNC: 1.9 MG/DL (ref 0.6–1.2)
EKG ATRIAL RATE: 61 BPM
EKG DIAGNOSIS: NORMAL
EKG P AXIS: 67 DEGREES
EKG P-R INTERVAL: 186 MS
EKG Q-T INTERVAL: 416 MS
EKG QRS DURATION: 86 MS
EKG QTC CALCULATION (BAZETT): 418 MS
EKG R AXIS: 40 DEGREES
EKG T AXIS: 80 DEGREES
EKG VENTRICULAR RATE: 61 BPM
EOSINOPHILS ABSOLUTE: 0.5 K/UL (ref 0–0.6)
EOSINOPHILS RELATIVE PERCENT: 9.4 %
GFR AFRICAN AMERICAN: 31
GFR NON-AFRICAN AMERICAN: 26
GLOBULIN: 4.1 G/DL
GLUCOSE BLD-MCNC: 93 MG/DL (ref 70–99)
HCT VFR BLD CALC: 40.5 % (ref 36–48)
HEMOGLOBIN: 13.1 G/DL (ref 12–16)
LYMPHOCYTES ABSOLUTE: 1.4 K/UL (ref 1–5.1)
LYMPHOCYTES RELATIVE PERCENT: 27.5 %
MCH RBC QN AUTO: 31.7 PG (ref 26–34)
MCHC RBC AUTO-ENTMCNC: 32.4 G/DL (ref 31–36)
MCV RBC AUTO: 97.7 FL (ref 80–100)
MONOCYTES ABSOLUTE: 0.4 K/UL (ref 0–1.3)
MONOCYTES RELATIVE PERCENT: 8 %
NEUTROPHILS ABSOLUTE: 2.8 K/UL (ref 1.7–7.7)
NEUTROPHILS RELATIVE PERCENT: 52.6 %
PDW BLD-RTO: 14.1 % (ref 12.4–15.4)
PLATELET # BLD: 206 K/UL (ref 135–450)
PMV BLD AUTO: 10.7 FL (ref 5–10.5)
POTASSIUM SERPL-SCNC: 4.1 MMOL/L (ref 3.5–5.1)
PRO-BNP: 1774 PG/ML (ref 0–449)
RAPID INFLUENZA  B AGN: NEGATIVE
RAPID INFLUENZA A AGN: NEGATIVE
RBC # BLD: 4.14 M/UL (ref 4–5.2)
SODIUM BLD-SCNC: 140 MMOL/L (ref 136–145)
TOTAL PROTEIN: 8 G/DL (ref 6.4–8.2)
TROPONIN: <0.01 NG/ML
WBC # BLD: 5.2 K/UL (ref 4–11)

## 2020-03-05 PROCEDURE — 6360000002 HC RX W HCPCS: Performed by: PHYSICIAN ASSISTANT

## 2020-03-05 PROCEDURE — 99285 EMERGENCY DEPT VISIT HI MDM: CPT

## 2020-03-05 PROCEDURE — 93010 ELECTROCARDIOGRAM REPORT: CPT | Performed by: INTERNAL MEDICINE

## 2020-03-05 PROCEDURE — 94640 AIRWAY INHALATION TREATMENT: CPT

## 2020-03-05 PROCEDURE — 2580000003 HC RX 258: Performed by: PHYSICIAN ASSISTANT

## 2020-03-05 PROCEDURE — 87804 INFLUENZA ASSAY W/OPTIC: CPT

## 2020-03-05 PROCEDURE — 84484 ASSAY OF TROPONIN QUANT: CPT

## 2020-03-05 PROCEDURE — 83880 ASSAY OF NATRIURETIC PEPTIDE: CPT

## 2020-03-05 PROCEDURE — 71046 X-RAY EXAM CHEST 2 VIEWS: CPT

## 2020-03-05 PROCEDURE — 96374 THER/PROPH/DIAG INJ IV PUSH: CPT

## 2020-03-05 PROCEDURE — 80053 COMPREHEN METABOLIC PANEL: CPT

## 2020-03-05 PROCEDURE — 36415 COLL VENOUS BLD VENIPUNCTURE: CPT

## 2020-03-05 PROCEDURE — 93005 ELECTROCARDIOGRAM TRACING: CPT

## 2020-03-05 PROCEDURE — 6370000000 HC RX 637 (ALT 250 FOR IP): Performed by: PHYSICIAN ASSISTANT

## 2020-03-05 PROCEDURE — 85025 COMPLETE CBC W/AUTO DIFF WBC: CPT

## 2020-03-05 RX ORDER — 0.9 % SODIUM CHLORIDE 0.9 %
500 INTRAVENOUS SOLUTION INTRAVENOUS ONCE
Status: COMPLETED | OUTPATIENT
Start: 2020-03-05 | End: 2020-03-05

## 2020-03-05 RX ORDER — ALBUTEROL SULFATE 90 UG/1
AEROSOL, METERED RESPIRATORY (INHALATION)
Qty: 1 INHALER | Refills: 0 | Status: SHIPPED | OUTPATIENT
Start: 2020-03-05 | End: 2020-07-22 | Stop reason: SDUPTHER

## 2020-03-05 RX ORDER — PREDNISONE 10 MG/1
40 TABLET ORAL DAILY
Qty: 20 TABLET | Refills: 0 | Status: SHIPPED | OUTPATIENT
Start: 2020-03-05 | End: 2020-03-10

## 2020-03-05 RX ORDER — METHYLPREDNISOLONE SODIUM SUCCINATE 125 MG/2ML
125 INJECTION, POWDER, LYOPHILIZED, FOR SOLUTION INTRAMUSCULAR; INTRAVENOUS ONCE
Status: COMPLETED | OUTPATIENT
Start: 2020-03-05 | End: 2020-03-05

## 2020-03-05 RX ORDER — IPRATROPIUM BROMIDE AND ALBUTEROL SULFATE 2.5; .5 MG/3ML; MG/3ML
3 SOLUTION RESPIRATORY (INHALATION) ONCE
Status: COMPLETED | OUTPATIENT
Start: 2020-03-05 | End: 2020-03-05

## 2020-03-05 RX ADMIN — SODIUM CHLORIDE 500 ML: 9 INJECTION, SOLUTION INTRAVENOUS at 11:23

## 2020-03-05 RX ADMIN — IPRATROPIUM BROMIDE AND ALBUTEROL SULFATE 3 AMPULE: .5; 3 SOLUTION RESPIRATORY (INHALATION) at 08:26

## 2020-03-05 RX ADMIN — METHYLPREDNISOLONE SODIUM SUCCINATE 125 MG: 125 INJECTION, POWDER, FOR SOLUTION INTRAMUSCULAR; INTRAVENOUS at 09:01

## 2020-03-05 NOTE — ED NOTES
Reported to [facility] pt manual BP , see new orders prior to d/c      Walker Paez, CORNELIUS  03/05/20 1344

## 2020-03-05 NOTE — ED PROVIDER NOTES
[address] [facility]        Pt Name: Gustavo Linares  MRN: [id number]  [place] 1945  Date of evaluation: 3/5/2020  Provider: Ramona Hogue PA-C  PCP: No primary care provider on file. I have seen and evaluated this patient with my supervising physician Isidro Gonzalez MD.    [facility]       Chief Complaint   Patient presents with    Shortness of Breath     pt brought in by [facility] ems, pt awoke at 3 am with difficulty breathing, pt couldnt find her inhaler. HISTORY OF PRESENT ILLNESS   (Location, Timing/Onset, Context/Setting, Quality, Duration, Modifying Factors, Severity, Associated Signs and Symptoms)  Note limiting factors. Gustavo Linares is a 66 y.o. female that presents to the emergency department with a chief complaint of shortness of breath. She states for the past couple days she has been having a cough that is occasionally productive of some cloudy sputum and has been having increased wheezing and shortness of breath on exertion but woke up earlier this morning with shortness of breath just at rest.  She does not wear oxygen at home. Has history of COPD and continues to smoke. Has history of hypertension, diabetes. She denies fevers, nausea, vomiting, chest pain, abdominal pain, leg swelling, urinary or bowel symptoms. States this feels like her COPD. Denies any history of CHF. Denies any other symptoms. Nursing Notes were all reviewed and agreed with or any disagreements were addressed in the HPI. REVIEW OF SYSTEMS    (2-9 systems for level 4, 10 or more for level 5)     Review of Systems    Positives and Pertinent negatives as per HPI. Except as noted above in the ROS, all other systems were reviewed and negative.        PAST MEDICAL HISTORY     Past Medical History:   Diagnosis Date    Arthritis     Asthma     Bipolar 1 disorder [facility]     Cancer ([facility]     bladder    [facility]ch[facility] obstructive [facility] disease) [facility]     Depression     controlled w/meds    Diabetes mellitus ([facility]     diet control    Hypertension     Neuropathy     toes    Obesity, unspecified     Osteoarthrosis, unspecified whether generalized or localized, lower leg     PVD (peripheral vascular disease) ([facility]     Tobacco use disorder     Unspecified cataract          SURGICAL HISTORY     Past Surgical History:   Procedure Laterality Date    ABDOMEN SURGERY      BLADDER REMOVAL      partial    CHOLECYSTECTOMY      COLONOSCOPY      CYSTOSCOPY  4-7-10    fulgeration of bladder tumor    CYSTOSCOPY  7/14/10    Cytology    CYSTOSCOPY  12/15/10    CYSTOSCOPY  3/2/11    COLLAGEN INJECTION    CYSTOSCOPY  6-    cytology    CYSTOSCOPY  10/19/11    CYSTOSCOPY  4/11/12    CYSTOSCOPY  10/8/12    cytology    CYSTOSCOPY  4/7/14    cysto/cytology    CYSTOSCOPY  04/29/2015    Cytology study    CYSTOSCOPY Right 1/16/2020    CYSTOSCOPY RIGHT URETERAL STENT REMOVAL, RIGHT RETROGRADE PYELOGRAM performed by Pierre Pride MD at [address] [place] [address]      cataract left eye    FEMUR FRACTURE SURGERY Left [id number]    GASTRIC BYPASS SURGERY      JOINT REPLACEMENT      left knee    OTHER SURGICAL HISTORY Left 10/24/2016    left nephroureterectomy    OTHER SURGICAL HISTORY  03/22/2017    RIGHT ureteral stent placement, RIGHT nephrostomy drain placement     TOTAL KNEE ARTHROPLASTY Left          CURRENTMEDICATIONS       Discharge Medication List as of 3/5/2020  1:39 PM      CONTINUE these medications which have NOT CHANGED    Details   hydrALAZINE (APRESOLINE) 25 MG tablet Take 1 tablet by mouth 3 times daily, Disp-90 tablet, R-0Print      metoprolol succinate (TOPROL XL) 25 MG extended release tablet TAKE ONE TABLET BY MOUTH ONCE DAILY, Disp-90 tablet, R-0Print      venlafaxine (EFFEXOR) 75 MG tablet Take 1 tablet by mouth 2 times daily, Disp-60 tablet, R-0PT NEEDS AN APPT FOR FUTURE REFILLS. NO PRINT rhonchi or rales. Comments: Mild expiratory wheezing bilaterally with decreased air movement and prolonged expiratory phase. No retractions or accessory muscle movement  Abdominal:      General: Bowel sounds are normal. There is no distension. Palpations: Abdomen is soft. There is no mass. Tenderness: There is no abdominal tenderness. There is no guarding or rebound. Hernia: No hernia is present. Musculoskeletal: Normal range of motion. General: No swelling. Right lower leg: No edema. Left lower leg: No edema. Skin:     General: Skin is warm and dry. Findings: No erythema or rash. Neurological:      Mental Status: She is alert and oriented to person, place, and time. Cranial Nerves: No cranial nerve deficit.    Psychiatric:         Behavior: Behavior normal.         DIAGNOSTIC RESULTS   LABS:    Labs Reviewed   CBC WITH AUTO DIFFERENTIAL - Abnormal; Notable for the following components:       Result Value    MPV 10.7 (*)     All other components within normal limits    Narrative:     Performed at:  [facility] [address], [facility], [address]   Phone [phone]   COMPREHENSIVE METABOLIC PANEL - Abnormal; Notable for the following components:    BUN 26 (*)     CREATININE 1.9 (*)     GFR Non- 26 (*)     GFR African American 31 (*)     Albumin/Globulin Ratio 1.0 (*)     Alkaline Phosphatase 132 (*)     All other components within normal limits    Narrative:     Performed at:  [facility] [address], [facility], [address]   Phone 21  - Abnormal; Notable for the following components:    Pro-BNP 1,774 (*)     All other components within normal limits    Narrative:     Performed at:  [facility] [address], [facility], [address]   Phone [phone]   RAPID INFLUENZA A/B ANTIGENS    Narrative: from the nebulizers. Her breathing is actually better and her lung sounds are now clear and she is moving air better. Patient ambulated maintaining oxygen saturation 97%. Had a slightly soft blood pressure and after fluids this is better and patient feels great. She wishes to go home. Have low suspicion for pulmonary embolus, pneumonia, aortic dissection. She continues to deny any pain. Creatinine is at her baseline. She states that she has not had her Dulera or albuterol inhaler for a couple months as her son did not get these filled. Suspect this is why she is having her COPD exacerbation. She will be given refill of her inhalers. Started on prednisone burst.  Return here for any worsening of symptoms or problems at home. FINAL IMPRESSION      1. COPD exacerbation [facility])          DISPOSITION/PLAN   DISPOSITION Decision To Discharge 03/05/2020 12:12:40 PM      PATIENT REFERREDTO:  [facility] Emergency Department  [address] [facility]  [address] [facility] [address]  [phone]    As needed    [facility]) Pre-Services  [phone]          DISCHARGE MEDICATIONS:  Discharge Medication List as of 3/5/2020  1:39 PM      START taking these medications    Details   predniSONE (DELTASONE) 10 MG tablet Take 4 tablets by mouth daily for 5 doses, Disp-20 tablet, R-0Print      albuterol sulfate  (90 Base) MCG/ACT inhaler Use 2 puffs 4 times daily for 7 days then as needed for wheezing. Dispense with Spacer and instruct in use. At patient's preference may use 60 dose MDI.  May Sub Pro-Air or Proventil as needed per insurance., Disp-1 Inhaler, R-0, DAWPrint             DISCONTINUED MEDICATIONS:  Discharge Medication List as of 3/5/2020  1:39 PM                 (Please note that portions of this note were completed with a voice recognition program.  Efforts were made to edit the dictations but occasionally words are mis-transcribed.)    Solomon Gardner PA-C (electronically signed)           Hi Conte Aki Ward PA-C  03/05/20 1527

## 2020-03-05 NOTE — ED NOTES
Pt ambulated with steady gait, sp02 held 97 % on RA while briskly walking around the nursing station. Report to DAMEON PEDROZA pt returned to bed, Pt is on a continuous pulse oximetry and telemetry monitoring. Pt continued on cycling blood pressure. Fall risk precautions in place, call light in reach, bed side table within reach,  will continue to monitor.          Margarita Szymanski RN  03/05/20 2687

## 2020-03-05 NOTE — ED NOTES
Placed pt belongings in a plastic bag fore deportment.      Criselda Alves, [facility]  03/05/20 0990

## 2020-03-05 NOTE — ED NOTES
Pt called out to complains that she felt \"like something was wrong\", pt itching at PIV site. Pt VSS and wnl. Pt appears anxious, call KASIA PA to bedside. Flushed pt PIV, with NS 10cc . No new orders at this time. Pt is on a continuous pulse oximetry and telemetry monitoring. Pt continued on cycling blood pressure. Fall risk precautions in place, call light in reach, bed side table within reach, bed alarm on, will continue to monitor.          Greg Epps, RN  03/05/20 5665

## 2020-03-05 NOTE — ED PROVIDER NOTES
present  Prior EKG comparison: EKG dated 1/14/20 is not significantly different    MDM:  Diagnostic considerations included acute coronary syndrome, pulmonary embolism, COPD/asthma, pneumonia, sepsis, pericardial tamponade, pneumothorax, CHF, thoracic aortic dissection, anxiety    ED course was notable for actually generally reassuring work-up in the ED with improvement after steroids and nebulizers. She was not hypoxic on ambulation assessment after treatment in the ED and therefore outpatient management seems appropriate. Her flu is negative and no evidence of an infectious process. Chronic kidney disease appears stable. Patient will be discharged with albuterol refill as well as prednisone and primary care follow-up. During the patient's ED course, the patient was given:  Medications   0.9 % sodium chloride bolus (has no administration in time range)   methylPREDNISolone sodium (SOLU-MEDROL) injection 125 mg (125 mg Intravenous Given 3/5/20 0901)   ipratropium-albuterol (DUONEB) nebulizer solution 3 ampule (3 ampules Inhalation Given 3/5/20 0826)        CLINICAL IMPRESSION  1. COPD exacerbation ([facility] [address]        DISPOSITION  Radha Darden was discharged to home in stable condition. I have discussed the findings of today's workup with the patient and addressed the patient's questions and concerns. Important warning signs as well as new or worsening symptoms which would necessitate immediate return to the ED were discussed. The plan is to discharge from the ED at this time, and the patient is in stable condition. The patient acknowledged understanding is agreeable with this plan. Patient was given scripts for the following medications. I counseled patient how to take these medications. New Prescriptions    ALBUTEROL SULFATE  (90 BASE) MCG/ACT INHALER    Use 2 puffs 4 times daily for 7 days then as needed for wheezing. Dispense with Spacer and instruct in use.   At patient's preference may use 60 dose MDI. May Sub Pro-Air or Proventil as needed per insurance. PREDNISONE (DELTASONE) 10 MG TABLET    Take 4 tablets by mouth daily for 5 doses     Follow-up with:  [facility] Emergency Department  [address] [facility]  [phone]    As needed    [facility]) Pre-Services  [phone]          This chart was created using Dragon dictation software. Efforts were made by me to ensure accuracy, however some errors may be present due to limitations of this technology.             Orquidea Castro MD  03/05/20 1123

## 2020-03-05 NOTE — ED NOTES
Dr. Sanchez Kick to bedside to see pt prior to discharge. Pt is awake alert no signs of distress.       Efraín De Jesus RN  03/05/20 4979

## 2020-05-06 ENCOUNTER — HOSPITAL ENCOUNTER (EMERGENCY)
Age: 75
Discharge: HOME | DRG: 190 | End: 2020-05-06
Attending: EMERGENCY MEDICINE
Payer: MEDICARE

## 2020-05-06 ENCOUNTER — APPOINTMENT (OUTPATIENT)
Dept: GENERAL RADIOLOGY | Age: 75
DRG: 190 | End: 2020-05-06
Payer: MEDICARE

## 2020-05-06 VITALS
DIASTOLIC BLOOD PRESSURE: 46 MMHG | SYSTOLIC BLOOD PRESSURE: 146 MMHG | HEIGHT: 66 IN | OXYGEN SATURATION: 94 % | TEMPERATURE: 97.9 F | WEIGHT: 135 LBS | HEART RATE: 90 BPM | BODY MASS INDEX: 21.69 KG/M2 | RESPIRATION RATE: 18 BRPM

## 2020-05-06 LAB
A/G RATIO: 0.9 (ref 1.1–2.2)
ALBUMIN SERPL-MCNC: 3.6 G/DL (ref 3.4–5)
ALP BLD-CCNC: 143 U/L (ref 40–129)
ALT SERPL-CCNC: 13 U/L (ref 10–40)
ANION GAP SERPL CALCULATED.3IONS-SCNC: 10 MMOL/L (ref 3–16)
AST SERPL-CCNC: 19 U/L (ref 15–37)
BASOPHILS ABSOLUTE: 0 K/UL (ref 0–0.2)
BASOPHILS RELATIVE PERCENT: 0.6 %
BILIRUB SERPL-MCNC: <0.2 MG/DL (ref 0–1)
BUN BLDV-MCNC: 36 MG/DL (ref 7–20)
CALCIUM SERPL-MCNC: 8.8 MG/DL (ref 8.3–10.6)
CHLORIDE BLD-SCNC: 106 MMOL/L (ref 99–110)
CO2: 24 MMOL/L (ref 21–32)
CREAT SERPL-MCNC: 1.6 MG/DL (ref 0.6–1.2)
EOSINOPHILS ABSOLUTE: 0.5 K/UL (ref 0–0.6)
EOSINOPHILS RELATIVE PERCENT: 7.5 %
GFR AFRICAN AMERICAN: 38
GFR NON-AFRICAN AMERICAN: 31
GLOBULIN: 4.1 G/DL
GLUCOSE BLD-MCNC: 124 MG/DL (ref 70–99)
HCT VFR BLD CALC: 37.7 % (ref 36–48)
HEMOGLOBIN: 12.4 G/DL (ref 12–16)
LYMPHOCYTES ABSOLUTE: 1.6 K/UL (ref 1–5.1)
LYMPHOCYTES RELATIVE PERCENT: 22.3 %
MCH RBC QN AUTO: 31.2 PG (ref 26–34)
MCHC RBC AUTO-ENTMCNC: 32.7 G/DL (ref 31–36)
MCV RBC AUTO: 95.4 FL (ref 80–100)
MONOCYTES ABSOLUTE: 0.6 K/UL (ref 0–1.3)
MONOCYTES RELATIVE PERCENT: 7.9 %
NEUTROPHILS ABSOLUTE: 4.5 K/UL (ref 1.7–7.7)
NEUTROPHILS RELATIVE PERCENT: 61.7 %
PDW BLD-RTO: 13.2 % (ref 12.4–15.4)
PLATELET # BLD: 294 K/UL (ref 135–450)
PMV BLD AUTO: 10 FL (ref 5–10.5)
POTASSIUM SERPL-SCNC: 4 MMOL/L (ref 3.5–5.1)
RBC # BLD: 3.96 M/UL (ref 4–5.2)
REASON FOR REJECTION: NORMAL
REJECTED TEST: NORMAL
SODIUM BLD-SCNC: 140 MMOL/L (ref 136–145)
TOTAL PROTEIN: 7.7 G/DL (ref 6.4–8.2)
WBC # BLD: 7.2 K/UL (ref 4–11)

## 2020-05-06 PROCEDURE — 6360000002 HC RX W HCPCS: Performed by: EMERGENCY MEDICINE

## 2020-05-06 PROCEDURE — 96374 THER/PROPH/DIAG INJ IV PUSH: CPT

## 2020-05-06 PROCEDURE — 85025 COMPLETE CBC W/AUTO DIFF WBC: CPT

## 2020-05-06 PROCEDURE — 80053 COMPREHEN METABOLIC PANEL: CPT

## 2020-05-06 PROCEDURE — 94640 AIRWAY INHALATION TREATMENT: CPT

## 2020-05-06 PROCEDURE — 99285 EMERGENCY DEPT VISIT HI MDM: CPT

## 2020-05-06 PROCEDURE — 93005 ELECTROCARDIOGRAM TRACING: CPT | Performed by: EMERGENCY MEDICINE

## 2020-05-06 PROCEDURE — 6370000000 HC RX 637 (ALT 250 FOR IP): Performed by: EMERGENCY MEDICINE

## 2020-05-06 PROCEDURE — 36415 COLL VENOUS BLD VENIPUNCTURE: CPT

## 2020-05-06 PROCEDURE — 71045 X-RAY EXAM CHEST 1 VIEW: CPT

## 2020-05-06 RX ORDER — IPRATROPIUM BROMIDE AND ALBUTEROL SULFATE 2.5; .5 MG/3ML; MG/3ML
1 SOLUTION RESPIRATORY (INHALATION) ONCE
Status: COMPLETED | OUTPATIENT
Start: 2020-05-06 | End: 2020-05-06

## 2020-05-06 RX ORDER — METHYLPREDNISOLONE SODIUM SUCCINATE 40 MG/ML
40 INJECTION, POWDER, LYOPHILIZED, FOR SOLUTION INTRAMUSCULAR; INTRAVENOUS ONCE
Status: COMPLETED | OUTPATIENT
Start: 2020-05-06 | End: 2020-05-06

## 2020-05-06 RX ORDER — IPRATROPIUM BROMIDE AND ALBUTEROL SULFATE 2.5; .5 MG/3ML; MG/3ML
1 SOLUTION RESPIRATORY (INHALATION) EVERY 4 HOURS PRN
Qty: 360 ML | Refills: 0 | Status: SHIPPED | OUTPATIENT
Start: 2020-05-06 | End: 2020-01-01 | Stop reason: SDUPTHER

## 2020-05-06 RX ORDER — PREDNISONE 20 MG/1
60 TABLET ORAL DAILY
Qty: 12 TABLET | Refills: 0 | Status: ON HOLD | OUTPATIENT
Start: 2020-05-06 | End: 2020-05-11 | Stop reason: HOSPADM

## 2020-05-06 RX ADMIN — ALBUTEROL SULFATE 5 MG: 2.5 SOLUTION RESPIRATORY (INHALATION) at 20:02

## 2020-05-06 RX ADMIN — METHYLPREDNISOLONE SODIUM SUCCINATE 40 MG: 40 INJECTION, POWDER, FOR SOLUTION INTRAMUSCULAR; INTRAVENOUS at 19:47

## 2020-05-06 RX ADMIN — IPRATROPIUM BROMIDE AND ALBUTEROL SULFATE 1 AMPULE: .5; 3 SOLUTION RESPIRATORY (INHALATION) at 20:02

## 2020-05-07 ENCOUNTER — HOSPITAL ENCOUNTER (INPATIENT)
Age: 75
LOS: 3 days | Discharge: HOME | DRG: 190 | End: 2020-05-11
Attending: EMERGENCY MEDICINE | Admitting: HOSPITALIST
Payer: MEDICARE

## 2020-05-07 ENCOUNTER — CARE COORDINATION (OUTPATIENT)
Dept: CARE COORDINATION | Age: 75
End: 2020-05-07

## 2020-05-07 ENCOUNTER — APPOINTMENT (OUTPATIENT)
Dept: GENERAL RADIOLOGY | Age: 75
DRG: 190 | End: 2020-05-07
Payer: MEDICARE

## 2020-05-07 LAB
ANION GAP SERPL CALCULATED.3IONS-SCNC: 13 MMOL/L (ref 3–16)
BASOPHILS ABSOLUTE: 0.1 K/UL (ref 0–0.2)
BASOPHILS RELATIVE PERCENT: 1.2 %
BUN BLDV-MCNC: 30 MG/DL (ref 7–20)
CALCIUM SERPL-MCNC: 8.8 MG/DL (ref 8.3–10.6)
CHLORIDE BLD-SCNC: 102 MMOL/L (ref 99–110)
CO2: 21 MMOL/L (ref 21–32)
CREAT SERPL-MCNC: 1.6 MG/DL (ref 0.6–1.2)
EKG ATRIAL RATE: 82 BPM
EKG DIAGNOSIS: NORMAL
EKG P AXIS: 46 DEGREES
EKG P-R INTERVAL: 168 MS
EKG Q-T INTERVAL: 392 MS
EKG QRS DURATION: 90 MS
EKG QTC CALCULATION (BAZETT): 457 MS
EKG R AXIS: 26 DEGREES
EKG T AXIS: 64 DEGREES
EKG VENTRICULAR RATE: 82 BPM
EOSINOPHILS ABSOLUTE: 1.1 K/UL (ref 0–0.6)
EOSINOPHILS RELATIVE PERCENT: 9.9 %
GFR AFRICAN AMERICAN: 38
GFR NON-AFRICAN AMERICAN: 31
GLUCOSE BLD-MCNC: 92 MG/DL (ref 70–99)
HCT VFR BLD CALC: 35.4 % (ref 36–48)
HEMOGLOBIN: 11.5 G/DL (ref 12–16)
LYMPHOCYTES ABSOLUTE: 2.4 K/UL (ref 1–5.1)
LYMPHOCYTES RELATIVE PERCENT: 22.1 %
MCH RBC QN AUTO: 30.8 PG (ref 26–34)
MCHC RBC AUTO-ENTMCNC: 32.4 G/DL (ref 31–36)
MCV RBC AUTO: 95 FL (ref 80–100)
MONOCYTES ABSOLUTE: 0.7 K/UL (ref 0–1.3)
MONOCYTES RELATIVE PERCENT: 6.6 %
NEUTROPHILS ABSOLUTE: 6.5 K/UL (ref 1.7–7.7)
NEUTROPHILS RELATIVE PERCENT: 60.2 %
PDW BLD-RTO: 13 % (ref 12.4–15.4)
PLATELET # BLD: 299 K/UL (ref 135–450)
PMV BLD AUTO: 9.7 FL (ref 5–10.5)
POTASSIUM SERPL-SCNC: 4.8 MMOL/L (ref 3.5–5.1)
PRO-BNP: 1839 PG/ML (ref 0–449)
RBC # BLD: 3.72 M/UL (ref 4–5.2)
SODIUM BLD-SCNC: 136 MMOL/L (ref 136–145)
TROPONIN: <0.01 NG/ML
WBC # BLD: 10.9 K/UL (ref 4–11)

## 2020-05-07 PROCEDURE — 71046 X-RAY EXAM CHEST 2 VIEWS: CPT

## 2020-05-07 PROCEDURE — 36415 COLL VENOUS BLD VENIPUNCTURE: CPT

## 2020-05-07 PROCEDURE — 94640 AIRWAY INHALATION TREATMENT: CPT

## 2020-05-07 PROCEDURE — 96374 THER/PROPH/DIAG INJ IV PUSH: CPT

## 2020-05-07 PROCEDURE — 84484 ASSAY OF TROPONIN QUANT: CPT

## 2020-05-07 PROCEDURE — 83880 ASSAY OF NATRIURETIC PEPTIDE: CPT

## 2020-05-07 PROCEDURE — 93010 ELECTROCARDIOGRAM REPORT: CPT | Performed by: INTERNAL MEDICINE

## 2020-05-07 PROCEDURE — 99285 EMERGENCY DEPT VISIT HI MDM: CPT

## 2020-05-07 PROCEDURE — 82803 BLOOD GASES ANY COMBINATION: CPT

## 2020-05-07 PROCEDURE — 6370000000 HC RX 637 (ALT 250 FOR IP): Performed by: EMERGENCY MEDICINE

## 2020-05-07 PROCEDURE — 80048 BASIC METABOLIC PNL TOTAL CA: CPT

## 2020-05-07 PROCEDURE — 6360000002 HC RX W HCPCS: Performed by: EMERGENCY MEDICINE

## 2020-05-07 PROCEDURE — 85025 COMPLETE CBC W/AUTO DIFF WBC: CPT

## 2020-05-07 PROCEDURE — 93005 ELECTROCARDIOGRAM TRACING: CPT | Performed by: EMERGENCY MEDICINE

## 2020-05-07 PROCEDURE — 2700000000 HC OXYGEN THERAPY PER DAY

## 2020-05-07 RX ORDER — IPRATROPIUM BROMIDE AND ALBUTEROL SULFATE 2.5; .5 MG/3ML; MG/3ML
1 SOLUTION RESPIRATORY (INHALATION) ONCE
Status: COMPLETED | OUTPATIENT
Start: 2020-05-07 | End: 2020-05-07

## 2020-05-07 RX ORDER — IPRATROPIUM BROMIDE AND ALBUTEROL SULFATE 2.5; .5 MG/3ML; MG/3ML
1 SOLUTION RESPIRATORY (INHALATION)
Status: DISCONTINUED | OUTPATIENT
Start: 2020-05-07 | End: 2020-05-07

## 2020-05-07 RX ORDER — IPRATROPIUM BROMIDE AND ALBUTEROL SULFATE 2.5; .5 MG/3ML; MG/3ML
1 SOLUTION RESPIRATORY (INHALATION)
Status: DISCONTINUED | OUTPATIENT
Start: 2020-05-08 | End: 2020-05-07

## 2020-05-07 RX ORDER — DEXAMETHASONE SODIUM PHOSPHATE 4 MG/ML
4 INJECTION, SOLUTION INTRA-ARTICULAR; INTRALESIONAL; INTRAMUSCULAR; INTRAVENOUS; SOFT TISSUE ONCE
Status: COMPLETED | OUTPATIENT
Start: 2020-05-07 | End: 2020-05-07

## 2020-05-07 RX ADMIN — DEXAMETHASONE SODIUM PHOSPHATE 4 MG: 4 INJECTION, SOLUTION INTRAMUSCULAR; INTRAVENOUS at 23:34

## 2020-05-07 RX ADMIN — IPRATROPIUM BROMIDE AND ALBUTEROL SULFATE 1 AMPULE: .5; 3 SOLUTION RESPIRATORY (INHALATION) at 23:54

## 2020-05-07 NOTE — ED PROVIDER NOTES
[address] [facility] PROVIDER NOTE    Patient Identification  Pt Name: Shalonda Oakes  MRN: [id number]  [place] 1945  Date of evaluation: 5/6/2020  Provider: Tam Reyes MD  PCP: No primary care provider on file. Chief Complaint  Shortness of Breath (Pt brought in by EMS from home. Pt reports SOB that started at 1700 today. Pt reports hx of COPD. denies fever, but reports productive cough that started a year ago. Patient states, \"I was at [place] last week for the same thing. \")      HPI  (History provided by patient)  This is a 66 y.o. female who was brought in by EMS transportation for shortness of breath. The patient's shortness of breath started earlier today and is progressively worsening over time. The patient has a history of COPD and states that this is similar to previous COPD exacerbations. She has a cough that is chronic and unchanged for over a year. Patient denies having any associated chest pain. She denies fever, chills, and other symptoms. She attempted treatment at home, but did not help, so she ended up calling EMS to be brought to the emergency department. ROS  10 systems reviewed, pertinent positives/negatives per HPI otherwise noted to be negative. I have reviewed the following nursing documentation:  Allergies: Aspirin; Ibuprofen; Adhesive tape; Hydrochlorothiazide; Microderm base [albolene];  Nsaids; Voltaren [diclofenac sodium]; and Procardia [nifedipine]    Past medical history:   Past Medical History:   Diagnosis Date    Arthritis     Asthma     Bipolar 1 disorder ([facility]     Cancer ([facility]     bladder    COPD (chronic obstructive pulmonary disease) ([facility]     Depression     controlled w/meds    Diabetes mellitus ([facility]     diet control    Hypertension     Neuropathy     toes    Obesity, unspecified     Osteoarthrosis, unspecified whether generalized or localized, lower leg     PVD (peripheral vascular disease) ([facility]     Tobacco use disorder     Unspecified cataract      Past surgical history:   Past Surgical History:   Procedure Laterality Date    ABDOMEN SURGERY      BLADDER REMOVAL      partial    CHOLECYSTECTOMY      COLONOSCOPY      CYSTOSCOPY  4-7-10    fulgeration of bladder tumor    CYSTOSCOPY  7/14/10    Cytology    CYSTOSCOPY  12/15/10    CYSTOSCOPY  3/2/11    COLLAGEN INJECTION    CYSTOSCOPY  6-    cytology    CYSTOSCOPY  10/19/11    CYSTOSCOPY  4/11/12    CYSTOSCOPY  10/8/12    cytology    CYSTOSCOPY  4/7/14    cysto/cytology    CYSTOSCOPY  04/29/2015    Cytology study    CYSTOSCOPY Right 1/16/2020    CYSTOSCOPY RIGHT URETERAL STENT REMOVAL, RIGHT RETROGRADE PYELOGRAM performed by Chirag Lebron MD at [address] [place] [address]      cataract left eye    FEMUR FRACTURE SURGERY Left [id number]    GASTRIC BYPASS SURGERY      JOINT REPLACEMENT      left knee    OTHER SURGICAL HISTORY Left 10/24/2016    left nephroureterectomy    OTHER SURGICAL HISTORY  03/22/2017    RIGHT ureteral stent placement, RIGHT nephrostomy drain placement     TOTAL KNEE ARTHROPLASTY Left        Home medications:   Previous Medications    ACETAMINOPHEN (TYLENOL) 500 MG TABLET    Take 500 mg by mouth every 6 hours as needed for Pain. ALBUTEROL SULFATE  (90 BASE) MCG/ACT INHALER    Use 2 puffs 4 times daily for 7 days then as needed for wheezing. Dispense with Spacer and instruct in use. At patient's preference may use 60 dose MDI. May Sub Pro-Air or Proventil as needed per insurance. CYANOCOBALAMIN (VITAMIN B-12) 1000 MCG EXTENDED RELEASE TABLET    Take 1 tablet by mouth daily    HYDRALAZINE (APRESOLINE) 25 MG TABLET    Take 1 tablet by mouth 3 times daily    METOPROLOL SUCCINATE (TOPROL XL) 25 MG EXTENDED RELEASE TABLET    TAKE ONE TABLET BY MOUTH ONCE DAILY    MOMETASONE-FORMOTEROL (DULERA) 200-5 MCG/ACT INHALER    Inhale 2 puffs into the lungs every 12 hours Note to pharmacist...   May substitute for comparable drug if this is not on formulary. .. Call if questions  [phone]    NICOTINE (NICODERM CQ) 14 MG/24HR    Place 1 patch onto the skin daily    VENLAFAXINE (EFFEXOR) 75 MG TABLET    Take 1 tablet by mouth 2 times daily       Social history:  reports that she has been smoking cigarettes. She has a 55.00 pack-year smoking history. She has never used smokeless tobacco. She reports previous alcohol use. She reports that she does not use drugs. Family history:    Family History   Problem Relation Age of Onset    Heart Disease Brother     Anesth Problems Mother     Heart Disease Mother     Stroke Brother     Heart Disease Father        Exam  ED Triage Vitals [05/06/20 1932]   BP Temp Temp Source Pulse Resp SpO2 Height Weight   (!) 179/94 97.9 °F (36.6 °C) Oral 87 26 98 % 5' 6\" (1.676 m) 135 lb (61.2 kg)     Nursing note and vitals reviewed. Constitutional: Mild respiratory distress with increased work of breathing. HENT:      Head: Normocephalic and atraumatic. Ears: External ears normal.      Nose: Nose normal.     Mouth: Membrane mucosa moist and pink. Eyes: Anicteric sclera. No discharge. Neck: Supple. Trachea midline. Cardiovascular: RRR; no murmurs, rubs, or gallops. Pulmonary/Chest: Tachypnea with increased work of breathing. Diminished breath sounds bilaterally with wheezes. Abdominal: Soft. No distension. Musculoskeletal: Moves all extremities. No gross deformity. No lower extremity edema. Neurological: Alert and oriented. Face symmetric. Speech is clear. Skin: Warm and dry. No rash. Psychiatric: Normal mood and affect. Behavior is normal.      EKG  The Ekg interpreted by me in the absence of a cardiologist shows. normal sinus rhythm with a rate of 82 with PACs  Axis is   Normal  QTc is  normal  Intervals and Durations are unremarkable. No specific ST-T wave changes appreciated. No evidence of acute ischemia.    In comparison to previous EKG from March 5, 2020, premature atrial complexes

## 2020-05-07 NOTE — ED NOTES
Pt Discharged in stable condition, VSS, no signs of distress, discharge instructions and meds reviewed. Pt verbalizes understanding and states no further questions or concerns unaddressed.        Angelica Singh RN  05/06/20 2574

## 2020-05-08 LAB
BASE EXCESS VENOUS: -4.4 MMOL/L (ref -3–3)
CARBOXYHEMOGLOBIN: 4.7 % (ref 0–1.5)
EKG ATRIAL RATE: 78 BPM
EKG DIAGNOSIS: NORMAL
EKG P AXIS: 65 DEGREES
EKG P-R INTERVAL: 164 MS
EKG Q-T INTERVAL: 382 MS
EKG QRS DURATION: 92 MS
EKG QTC CALCULATION (BAZETT): 435 MS
EKG R AXIS: 20 DEGREES
EKG T AXIS: 72 DEGREES
EKG VENTRICULAR RATE: 78 BPM
HCO3 VENOUS: 21.5 MMOL/L (ref 23–29)
LACTIC ACID: 1.5 MMOL/L (ref 0.4–2)
METHEMOGLOBIN VENOUS: 0.8 %
O2 CONTENT, VEN: 15 VOL %
O2 SAT, VEN: 99 %
O2 THERAPY: ABNORMAL
PCO2, VEN: 42.1 MMHG (ref 40–50)
PH VENOUS: 7.32 (ref 7.35–7.45)
PO2, VEN: 192 MMHG (ref 25–40)
REASON FOR REJECTION: NORMAL
REJECTED TEST: NORMAL
REPORT: NORMAL
RESPIRATORY PANEL PCR: NORMAL
TCO2 CALC VENOUS: 51 MMOL/L

## 2020-05-08 PROCEDURE — 2580000003 HC RX 258: Performed by: HOSPITALIST

## 2020-05-08 PROCEDURE — G0378 HOSPITAL OBSERVATION PER HR: HCPCS

## 2020-05-08 PROCEDURE — 94761 N-INVAS EAR/PLS OXIMETRY MLT: CPT

## 2020-05-08 PROCEDURE — 36415 COLL VENOUS BLD VENIPUNCTURE: CPT

## 2020-05-08 PROCEDURE — 1200000000 HC SEMI PRIVATE

## 2020-05-08 PROCEDURE — 0100U HC RESPIRPTHGN MULT REV TRANS & AMP PRB TECH 21 TRGT: CPT

## 2020-05-08 PROCEDURE — 2580000003 HC RX 258

## 2020-05-08 PROCEDURE — 83605 ASSAY OF LACTIC ACID: CPT

## 2020-05-08 PROCEDURE — 6370000000 HC RX 637 (ALT 250 FOR IP): Performed by: HOSPITALIST

## 2020-05-08 PROCEDURE — 94640 AIRWAY INHALATION TREATMENT: CPT

## 2020-05-08 PROCEDURE — 6360000002 HC RX W HCPCS: Performed by: HOSPITALIST

## 2020-05-08 PROCEDURE — 93010 ELECTROCARDIOGRAM REPORT: CPT | Performed by: INTERNAL MEDICINE

## 2020-05-08 PROCEDURE — 2700000000 HC OXYGEN THERAPY PER DAY

## 2020-05-08 PROCEDURE — U0003 INFECTIOUS AGENT DETECTION BY NUCLEIC ACID (DNA OR RNA); SEVERE ACUTE RESPIRATORY SYNDROME CORONAVIRUS 2 (SARS-COV-2) (CORONAVIRUS DISEASE [COVID-19]), AMPLIFIED PROBE TECHNIQUE, MAKING USE OF HIGH THROUGHPUT TECHNOLOGIES AS DESCRIBED BY CMS-2020-01-R: HCPCS

## 2020-05-08 RX ORDER — SODIUM CHLORIDE 0.9 % (FLUSH) 0.9 %
10 SYRINGE (ML) INJECTION PRN
Status: DISCONTINUED | OUTPATIENT
Start: 2020-05-08 | End: 2020-05-11 | Stop reason: HOSPADM

## 2020-05-08 RX ORDER — LANOLIN ALCOHOL/MO/W.PET/CERES
1000 CREAM (GRAM) TOPICAL DAILY
Status: DISCONTINUED | OUTPATIENT
Start: 2020-05-08 | End: 2020-05-11 | Stop reason: HOSPADM

## 2020-05-08 RX ORDER — METHYLPREDNISOLONE SODIUM SUCCINATE 40 MG/ML
40 INJECTION, POWDER, LYOPHILIZED, FOR SOLUTION INTRAMUSCULAR; INTRAVENOUS EVERY 8 HOURS
Status: DISCONTINUED | OUTPATIENT
Start: 2020-05-08 | End: 2020-05-09

## 2020-05-08 RX ORDER — ACETAMINOPHEN 650 MG/1
650 SUPPOSITORY RECTAL EVERY 6 HOURS PRN
Status: DISCONTINUED | OUTPATIENT
Start: 2020-05-08 | End: 2020-05-11 | Stop reason: HOSPADM

## 2020-05-08 RX ORDER — HYDRALAZINE HYDROCHLORIDE 25 MG/1
25 TABLET, FILM COATED ORAL 3 TIMES DAILY
Status: DISCONTINUED | OUTPATIENT
Start: 2020-05-08 | End: 2020-05-11 | Stop reason: HOSPADM

## 2020-05-08 RX ORDER — ALBUTEROL SULFATE 90 UG/1
2 AEROSOL, METERED RESPIRATORY (INHALATION)
Status: DISCONTINUED | OUTPATIENT
Start: 2020-05-08 | End: 2020-05-11 | Stop reason: HOSPADM

## 2020-05-08 RX ORDER — SODIUM CHLORIDE 9 MG/ML
INJECTION, SOLUTION INTRAVENOUS
Status: COMPLETED
Start: 2020-05-08 | End: 2020-05-08

## 2020-05-08 RX ORDER — HYDRALAZINE HYDROCHLORIDE 20 MG/ML
10 INJECTION INTRAMUSCULAR; INTRAVENOUS EVERY 6 HOURS PRN
Status: DISCONTINUED | OUTPATIENT
Start: 2020-05-08 | End: 2020-05-11 | Stop reason: HOSPADM

## 2020-05-08 RX ORDER — ALBUTEROL SULFATE 2.5 MG/3ML
2.5 SOLUTION RESPIRATORY (INHALATION)
Status: DISCONTINUED | OUTPATIENT
Start: 2020-05-08 | End: 2020-05-08

## 2020-05-08 RX ORDER — SODIUM CHLORIDE 0.9 % (FLUSH) 0.9 %
10 SYRINGE (ML) INJECTION EVERY 12 HOURS SCHEDULED
Status: DISCONTINUED | OUTPATIENT
Start: 2020-05-08 | End: 2020-05-11 | Stop reason: HOSPADM

## 2020-05-08 RX ORDER — VENLAFAXINE 37.5 MG/1
75 TABLET ORAL 2 TIMES DAILY
Status: DISCONTINUED | OUTPATIENT
Start: 2020-05-08 | End: 2020-05-11 | Stop reason: HOSPADM

## 2020-05-08 RX ORDER — POLYETHYLENE GLYCOL 3350 17 G/17G
17 POWDER, FOR SOLUTION ORAL DAILY PRN
Status: DISCONTINUED | OUTPATIENT
Start: 2020-05-08 | End: 2020-05-11 | Stop reason: HOSPADM

## 2020-05-08 RX ORDER — ACETAMINOPHEN 325 MG/1
650 TABLET ORAL EVERY 6 HOURS PRN
Status: DISCONTINUED | OUTPATIENT
Start: 2020-05-08 | End: 2020-05-11 | Stop reason: HOSPADM

## 2020-05-08 RX ORDER — NICOTINE 21 MG/24HR
1 PATCH, TRANSDERMAL 24 HOURS TRANSDERMAL DAILY
Status: DISCONTINUED | OUTPATIENT
Start: 2020-05-08 | End: 2020-05-11 | Stop reason: HOSPADM

## 2020-05-08 RX ORDER — IPRATROPIUM BROMIDE AND ALBUTEROL SULFATE 2.5; .5 MG/3ML; MG/3ML
1 SOLUTION RESPIRATORY (INHALATION)
Status: DISCONTINUED | OUTPATIENT
Start: 2020-05-08 | End: 2020-05-08

## 2020-05-08 RX ORDER — PROMETHAZINE HYDROCHLORIDE 25 MG/1
12.5 TABLET ORAL EVERY 6 HOURS PRN
Status: DISCONTINUED | OUTPATIENT
Start: 2020-05-08 | End: 2020-05-11 | Stop reason: HOSPADM

## 2020-05-08 RX ORDER — METOPROLOL SUCCINATE 25 MG/1
25 TABLET, EXTENDED RELEASE ORAL DAILY
Status: DISCONTINUED | OUTPATIENT
Start: 2020-05-08 | End: 2020-05-11 | Stop reason: HOSPADM

## 2020-05-08 RX ORDER — ONDANSETRON 2 MG/ML
4 INJECTION INTRAMUSCULAR; INTRAVENOUS EVERY 6 HOURS PRN
Status: DISCONTINUED | OUTPATIENT
Start: 2020-05-08 | End: 2020-05-11 | Stop reason: HOSPADM

## 2020-05-08 RX ADMIN — Medication 2 PUFF: at 16:39

## 2020-05-08 RX ADMIN — Medication 2 PUFF: at 08:41

## 2020-05-08 RX ADMIN — METHYLPREDNISOLONE SODIUM SUCCINATE 40 MG: 40 INJECTION, POWDER, FOR SOLUTION INTRAMUSCULAR; INTRAVENOUS at 14:42

## 2020-05-08 RX ADMIN — Medication 10 ML: at 20:27

## 2020-05-08 RX ADMIN — VENLAFAXINE 75 MG: 37.5 TABLET ORAL at 20:27

## 2020-05-08 RX ADMIN — ENOXAPARIN SODIUM 30 MG: 30 INJECTION SUBCUTANEOUS at 08:29

## 2020-05-08 RX ADMIN — ACETAMINOPHEN 650 MG: 325 TABLET, FILM COATED ORAL at 20:28

## 2020-05-08 RX ADMIN — VENLAFAXINE 75 MG: 37.5 TABLET ORAL at 08:30

## 2020-05-08 RX ADMIN — SODIUM CHLORIDE 250 ML: 9 INJECTION, SOLUTION INTRAVENOUS at 03:03

## 2020-05-08 RX ADMIN — Medication 2 PUFF: at 12:16

## 2020-05-08 RX ADMIN — Medication 2 PUFF: at 08:42

## 2020-05-08 RX ADMIN — HYDRALAZINE HYDROCHLORIDE 25 MG: 25 TABLET, FILM COATED ORAL at 20:27

## 2020-05-08 RX ADMIN — METHYLPREDNISOLONE SODIUM SUCCINATE 40 MG: 40 INJECTION, POWDER, FOR SOLUTION INTRAMUSCULAR; INTRAVENOUS at 05:20

## 2020-05-08 RX ADMIN — Medication 2 PUFF: at 20:55

## 2020-05-08 RX ADMIN — Medication 1000 MCG: at 08:29

## 2020-05-08 RX ADMIN — HYDRALAZINE HYDROCHLORIDE 25 MG: 25 TABLET, FILM COATED ORAL at 08:30

## 2020-05-08 RX ADMIN — Medication 2 PUFF: at 20:56

## 2020-05-08 RX ADMIN — METOPROLOL SUCCINATE 25 MG: 25 TABLET, FILM COATED, EXTENDED RELEASE ORAL at 08:30

## 2020-05-08 RX ADMIN — Medication 2 PUFF: at 12:17

## 2020-05-08 RX ADMIN — HYDRALAZINE HYDROCHLORIDE 25 MG: 25 TABLET, FILM COATED ORAL at 14:44

## 2020-05-08 RX ADMIN — METHYLPREDNISOLONE SODIUM SUCCINATE 40 MG: 40 INJECTION, POWDER, FOR SOLUTION INTRAMUSCULAR; INTRAVENOUS at 21:55

## 2020-05-08 RX ADMIN — Medication 10 ML: at 08:35

## 2020-05-08 RX ADMIN — AZITHROMYCIN MONOHYDRATE 500 MG: 500 INJECTION, POWDER, LYOPHILIZED, FOR SOLUTION INTRAVENOUS at 03:07

## 2020-05-08 ASSESSMENT — PAIN SCALES - GENERAL
PAINLEVEL_OUTOF10: 0
PAINLEVEL_OUTOF10: 1
PAINLEVEL_OUTOF10: 0
PAINLEVEL_OUTOF10: 4

## 2020-05-08 ASSESSMENT — PAIN DESCRIPTION - LOCATION
LOCATION: HEAD
LOCATION: HEAD

## 2020-05-08 ASSESSMENT — PAIN DESCRIPTION - PAIN TYPE
TYPE: ACUTE PAIN
TYPE: ACUTE PAIN

## 2020-05-08 NOTE — ED NOTES
Pt presented from home via  EMS with SOB, Pt has a history of COPD. Pt took one breathing treatment at home with no relief, Pt placed on 4L NC by squad. Pt seen this week here in the ED for shortness of breath as well, Pt was unable to fill prescribed medications after discharge. Pt is AxOx4, resting in stretcher bed, VSS. EKG completed, PIV placed and labs sent, Pt medicated per order, will continue to monitor.      Gus Lugo RN  05/07/20 8022

## 2020-05-08 NOTE — ED PROVIDER NOTES
Substance and Sexual Activity    Alcohol use: Not Currently     Alcohol/week: 0.0 standard drinks    Drug use: No    Sexual activity: Not Currently   Lifestyle    Physical activity     Days per week: Not on file     Minutes per session: Not on file    Stress: Not on file   Relationships    Social connections     Talks on phone: Not on file     Gets together: Not on file     Attends [employer] service: Not on file     Active member of club or organization: Not on file     Attends meetings of clubs or organizations: Not on file     Relationship status: Not on file    Intimate partner violence     Fear of current or ex partner: Not on file     Emotionally abused: Not on file     Physically abused: Not on file     Forced sexual activity: Not on file   Other Topics Concern    Not on file   Social History Narrative    Not on file     No current facility-administered medications for this encounter. Current Outpatient Medications   Medication Sig Dispense Refill    predniSONE (DELTASONE) 20 MG tablet Take 3 tablets by mouth daily for 4 days 12 tablet 0    ipratropium-albuterol (DUONEB) 0.5-2.5 (3) MG/3ML SOLN nebulizer solution Inhale 3 mLs into the lungs every 4 hours as needed for Shortness of Breath 360 mL 0    mometasone-formoterol (DULERA) 200-5 MCG/ACT inhaler Inhale 2 puffs into the lungs every 12 hours Note to pharmacist... May substitute for comparable drug if this is not on formulary. .. Call if questions  [phone] 1 Inhaler 0    albuterol sulfate  (90 Base) MCG/ACT inhaler Use 2 puffs 4 times daily for 7 days then as needed for wheezing. Dispense with Spacer and instruct in use. At patient's preference may use 60 dose MDI. May Sub Pro-Air or Proventil as needed per insurance.  1 Inhaler 0    hydrALAZINE (APRESOLINE) 25 MG tablet Take 1 tablet by mouth 3 times daily 90 tablet 0    venlafaxine (EFFEXOR) 75 MG tablet Take 1 tablet by mouth 2 times daily 60 tablet 0    metoprolol succinate (TOPROL XL) 25 MG extended release tablet TAKE ONE TABLET BY MOUTH ONCE DAILY 90 tablet 0    nicotine (NICODERM CQ) 14 MG/24HR Place 1 patch onto the skin daily 30 patch 3    Cyanocobalamin (VITAMIN B-12) 1000 MCG extended release tablet Take 1 tablet by mouth daily 90 tablet 3    acetaminophen (TYLENOL) 500 MG tablet Take 500 mg by mouth every 6 hours as needed for Pain. Allergies   Allergen Reactions    Aspirin Shortness Of Breath    Ibuprofen Shortness Of Breath    Adhesive Tape Swelling     Only if it stays on a long time    Hydrochlorothiazide Other (See Comments)     Palpitations.  Microderm Base [Albolene]     Nsaids Other (See Comments)     Respiratory failure.  Voltaren [Diclofenac Sodium]      Wheezing, swelling.  Procardia [Nifedipine] Swelling and Rash       Nursing Notes Reviewed    Physical Exam:  Triage VS:    ED Triage Vitals [05/07/20 2242]   Enc Vitals Group      BP (!) 180/68      Pulse 83      Resp 19      Temp 97.5 °F (36.4 °C)      Temp Source Oral      SpO2 100 %      Weight 135 lb (61.2 kg)      Height 5' 6\" (1.676 m)      Head Circumference       Peak Flow       Pain Score       Pain Loc       Pain Edu? Excl. in [address]? My pulse ox interpretation is - normal    General appearance:  No acute distress  Skin:  Warm. Dry. No pallor. No rash. Eye:  Normal conjuctiva. no Icterus. Ears, nose, mouth and throat:  Oral mucosa moist   Heart:  Regular rate and rhythm, normal S1 & S2, no extra heart sounds, no murmurs. Perfusion:  Within normal limits. Respiratory:  Respirations nonlabored. expiratory wheezes noted, good air entry throughout, no tachypnea     Abdominal:  Soft. Nontender. Non distended. Extremity:  No edema or tenderness  Neurological:  Alert and oriented,  No focal neuro deficits.      I have reviewed and interpreted all of the currently available lab results from this visit (if applicable):  Results for orders placed or

## 2020-05-08 NOTE — CARE COORDINATION
CM spoke with Lacy/ [address] [place] [address], patient has a  who has not been staying as long in the trailer as long as she should. Patient has access to transportation through \[facility]\"" if she provides her  2-3 days advance notice of her needs. Patient told this CM that her daughter was supposed to  prescriptions for her, she told her daughter that a neighbor was going to assist--Lacy has patient listed as active with Dr Tivis Bumpers. [employer] [facility] needs notification of discharge in order to resume services for meals on wheels. [facility] has been following case in order to clean the property and manage patient. There are roaches and bedbugs in the trailer, COA is almost at the point of being able to fumigate trailer for bed bugs.     Walt Fernandez, BSN, CCM, RN  [facility]  [phone]

## 2020-05-08 NOTE — PLAN OF CARE
Problem: Falls - Risk of:  Goal: Will remain free from falls  Description: Will remain free from falls  5/8/2020 1520 by Angle Fraire RN  Outcome: Ongoing  5/8/2020 1427 by Angle Fraire RN  Outcome: Ongoing  Goal: Absence of physical injury  Description: Absence of physical injury  5/8/2020 1520 by Angle Fraire RN  Outcome: Ongoing  5/8/2020 1427 by Angle Fraire RN  Outcome: Ongoing     Problem: Gas Exchange - Impaired:  Goal: Levels of oxygenation will improve  Description: Levels of oxygenation will improve  5/8/2020 1520 by Angle Fraire RN  Outcome: Ongoing  5/8/2020 1427 by Angle Fraire RN  Outcome: Ongoing  5/8/2020 0230 by Michele Coppola RN  Outcome: Ongoing

## 2020-05-08 NOTE — CARE COORDINATION
Discharge Planning Assessment    RN discharge planner met with patient/ (and family member) to discuss reason for admission, current living situation, and potential needs at the time of discharge    Demographics/Insurance verified Yes  Medicare    Current type of dwelling: trailer has ramp entry w/ rails    Patient from ECF/SW confirmed with: n/a    Living arrangements: lives alone with a dog    Level of function/Support:ambulates    PCP: none--CM provided PCP list    Last Visit to PCP: 2 years    DME: shower seat, nebulizer, cane & walker available    Active with any community resources/agencies/skilled home care: no skilled services, active with [facility] for [facility] would like home maintenance  CM left a voice message for [facility] CM Naseem Mujica to determine what services patient is receiving and if patient can have additional aide or homemaker services at time of discharge. Medication compliance issues: difficulty getting medications filled due to a lack of transportation, uses [facility] on [address] Discussed using mail order pharmacy or home delivery    Financial issues that could impact healthcare: no concerns        Tentative discharge plan: return home    Discussed and provided facilities of choice if transition to a skilled nursing facility is required at the time of discharge      Discussed with patient and/or family that on the day of discharge home tentative time of discharge will be between 10 AM and noon.     Transportation at the time of discharge: potentially family, may need Lyft or CM to arrange    CALEB HernandezN, CCM, RN  [facility]  [phone]

## 2020-05-08 NOTE — PROGRESS NOTES
[facility] PROGRESS NOTE    5/8/2020 12:14 PM        Name: Phyllis Norwood . Admitted: 5/7/2020  Primary Care Provider: No primary care provider on file. (Tel: None)                        Hospital course:  Phyllis Norwood is a 66 y.o. female who presented with SOB and Hypoxemia at home and no relief post Nebs at home X 1 . Unable to fill her scripts and supposed to be on Inhalers at home . Not on Home O2  Reports cough w/ Mild Phlegm at home  No travel  No sick contacts  Recurrent exacerbations ? 2/2 infection vs Non compliance issues  ? ? Suspected  No fever/chills  Still smoked 3-4 Cig/Day       Subjective:  . No acute events overnight. Resting well. Pain control. Diet ok. Labs reviewed  Denies any chest pain sob.      Reviewed interval ancillary notes    Current Medications  hydrALAZINE (APRESOLINE) tablet 25 mg, TID  metoprolol succinate (TOPROL XL) extended release tablet 25 mg, Daily  venlafaxine (EFFEXOR) tablet 75 mg, BID  vitamin B-12 (CYANOCOBALAMIN) tablet 1,000 mcg, Daily  sodium chloride flush 0.9 % injection 10 mL, 2 times per day  sodium chloride flush 0.9 % injection 10 mL, PRN  acetaminophen (TYLENOL) tablet 650 mg, Q6H PRN    Or  acetaminophen (TYLENOL) suppository 650 mg, Q6H PRN  polyethylene glycol (GLYCOLAX) packet 17 g, Daily PRN  promethazine (PHENERGAN) tablet 12.5 mg, Q6H PRN    Or  ondansetron (ZOFRAN) injection 4 mg, Q6H PRN  enoxaparin (LOVENOX) injection 30 mg, Daily  nicotine (NICODERM CQ) 14 MG/24HR 1 patch, Daily  azithromycin (ZITHROMAX) 500 mg in D5W 250ml Vial Mate, Q24H  methylPREDNISolone sodium (SOLU-MEDROL) injection 40 mg, Q8H  hydrALAZINE (APRESOLINE) injection 10 mg, Q6H PRN  albuterol sulfate  (90 Base) MCG/ACT inhaler 2 puff, Q2H PRN  albuterol sulfate  (90 Base) MCG/ACT inhaler 2 puff, Q4H WA  ipratropium Asthma    Bipolar I disorder, single manic episode (HCC)    Recurrent major depressive disorder, in remission ([facility]    Diabetes mellitus type 2 in nonobese ([facility]    Tobacco use disorder    Essential hypertension    Venous insufficiency    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 (HCC)    Chronic constipation    COPD exacerbation (HCC)  Resolved Problems:    * No resolved hospital problems. *       Assessment & Plan:   1. Continue Solu-Medrol, Acapella continue as needed oxygen, empiric azithromycin already started. 2. COVID-19 rule out, results pending continue droplet plus precautions  3. Acute respiratory failure with hypoxemia noted currently on 2 L of oxygen with a 97% SPO2  4. Blood pressure slightly uncontrolled, continue with metoprolol and hydralazine   5.   History of depression, continue with Effexor    Diet: DIET CARDIAC;  Code:Full Code  DVT PPX lovenox       Pauline Case MD   5/8/2020 12:14 PM

## 2020-05-08 NOTE — PLAN OF CARE
Problem: Falls - Risk of:  Goal: Will remain free from falls  Description: Will remain free from falls  Outcome: Ongoing  Goal: Absence of physical injury  Description: Absence of physical injury  Outcome: Ongoing     Problem: Gas Exchange - Impaired:  Goal: Levels of oxygenation will improve  Description: Levels of oxygenation will improve  5/8/2020 1427 by Robert Gonzalez RN  Outcome: Ongoing  5/8/2020 0230 by Lee Dejesus RN  Outcome: Ongoing

## 2020-05-08 NOTE — H&P
· No chest pain   · COVID test sent from ED   ·     REVIEW OF SYSTEMS:     Constitutional:  Negative for fever, chills or night sweats  ENT:   Negative for rhinorrhea, epistaxis, hoarseness, sore throat. Respiratory:   As above   Cardiovascular:   Negative for  chest pain, palpitations   Gastrointestinal:   Negative for nausea, vomiting, diarrhea  Genitourinary:   Negative for polyuria, dysuria   Hematologic/Lymphatic:   Negative for  bleeding tendency, easy bruising  Musculoskeletal:   Negative for myalgias and arthralgias  Neurologic:   Negative for  Confusion, dysarthria. Skin:   Negative for itching,rash  Psychiatric:  Negative for depression, anxiety, agitation. Endocrine:  Negative for polydipsia, polyuria,heat /cold intolerance. Past Medical History:         has a past medical history of Arthritis, Asthma, Bipolar 1 disorder ([facility] Cancer ([facility] COPD (chronic obstructive pulmonary disease) ([facility] Depression, Diabetes mellitus ([facility] [facility] Hypertension, Neuropathy, Obesity, unspecified, Osteoarthrosis, unspecified whether generalized or localized, lower leg, PVD (peripheral vascular disease) ([facility] Tobacco use disorder, and Unspecified cataract. Past Surgical History:         has a past surgical history that includes Cholecystectomy; Gastric bypass surgery; Bladder removal; joint replacement; eye surgery; Cystoscopy (4-7-10); Cystoscopy (7/14/10); Cystoscopy (12/15/10); Cystoscopy (3/2/11); Cystoscopy (6-); Cystoscopy (10/19/11); Cystoscopy (4/11/12); Cystocopy (10/8/12); Cystocopy (4/7/14); Total knee arthroplasty (Left); Femur fracture surgery (Left, [id number]); Cystoscopy (04/29/2015); Colonoscopy; Abdomen surgery; other surgical history (Left, 10/24/2016); other surgical history (03/22/2017); and Cystoscopy (Right, 1/16/2020).        Medications:      Current Outpatient Medications on File Prior to Encounter   Medication Sig Dispense Refill    predniSONE (DELTASONE) 20 MG tablet Take 3 · Bed bug infestation . F/u protocol for disinfection    · Anxiety and depression . Chronic condition. Monitor/Manage/Adjust medications, while in house prn. For now, resumed home medications of Effexor   · Bipolar d/o       · Home medications Reviewed     · Home medications Held/Resumed, and Pertinent changes made in home medications on admission, as needed, according to current medical status, as mentioned above. · DVT Prophylaxis : Lovenox   ; + SCDs   · Nutrition/Diet: DIET CARDIAC;  · Code Status: Full Code  · PT/OT and ambulatory Eval Status: Ambulate with Assist   · Probable LOS & future Disposition planned post discharge  - Home in 1-2 days     Please see EPIC orders for detailed orders/recommendations of plan and medications. CONSULTS ORDERED @ ADMISSION   IP CONSULT TO HOSPITALIST      Medical Decision Making : HIGH     Total patient  Care time spent in evaluating the patient an discussing plan with appropriate staff/patient/family members is 46 min       Padmaja Manzano MD    Hospitalist, [facility].    5/8/2020 1:15 AM

## 2020-05-09 LAB
A/G RATIO: 0.9 (ref 1.1–2.2)
ALBUMIN SERPL-MCNC: 3.2 G/DL (ref 3.4–5)
ALP BLD-CCNC: 126 U/L (ref 40–129)
ALT SERPL-CCNC: 13 U/L (ref 10–40)
ANION GAP SERPL CALCULATED.3IONS-SCNC: 11 MMOL/L (ref 3–16)
AST SERPL-CCNC: 20 U/L (ref 15–37)
BASOPHILS ABSOLUTE: 0 K/UL (ref 0–0.2)
BASOPHILS RELATIVE PERCENT: 0.1 %
BILIRUB SERPL-MCNC: <0.2 MG/DL (ref 0–1)
BUN BLDV-MCNC: 37 MG/DL (ref 7–20)
CALCIUM SERPL-MCNC: 8.6 MG/DL (ref 8.3–10.6)
CHLORIDE BLD-SCNC: 99 MMOL/L (ref 99–110)
CO2: 20 MMOL/L (ref 21–32)
CREAT SERPL-MCNC: 1.6 MG/DL (ref 0.6–1.2)
EOSINOPHILS ABSOLUTE: 0 K/UL (ref 0–0.6)
EOSINOPHILS RELATIVE PERCENT: 0 %
GFR AFRICAN AMERICAN: 38
GFR NON-AFRICAN AMERICAN: 31
GLOBULIN: 3.6 G/DL
GLUCOSE BLD-MCNC: 133 MG/DL (ref 70–99)
HCT VFR BLD CALC: 32.8 % (ref 36–48)
HEMOGLOBIN: 10.7 G/DL (ref 12–16)
LYMPHOCYTES ABSOLUTE: 0.8 K/UL (ref 1–5.1)
LYMPHOCYTES RELATIVE PERCENT: 7.9 %
MCH RBC QN AUTO: 30.5 PG (ref 26–34)
MCHC RBC AUTO-ENTMCNC: 32.5 G/DL (ref 31–36)
MCV RBC AUTO: 93.8 FL (ref 80–100)
MONOCYTES ABSOLUTE: 0.3 K/UL (ref 0–1.3)
MONOCYTES RELATIVE PERCENT: 2.6 %
NEUTROPHILS ABSOLUTE: 8.6 K/UL (ref 1.7–7.7)
NEUTROPHILS RELATIVE PERCENT: 89.4 %
OSMOLALITY: 295 MOSM/KG (ref 280–301)
PDW BLD-RTO: 13.4 % (ref 12.4–15.4)
PLATELET # BLD: 284 K/UL (ref 135–450)
PMV BLD AUTO: 10 FL (ref 5–10.5)
POTASSIUM REFLEX MAGNESIUM: 4.4 MMOL/L (ref 3.5–5.1)
RBC # BLD: 3.5 M/UL (ref 4–5.2)
SODIUM BLD-SCNC: 130 MMOL/L (ref 136–145)
TOTAL PROTEIN: 6.8 G/DL (ref 6.4–8.2)
WBC # BLD: 9.6 K/UL (ref 4–11)

## 2020-05-09 PROCEDURE — 80053 COMPREHEN METABOLIC PANEL: CPT

## 2020-05-09 PROCEDURE — 83935 ASSAY OF URINE OSMOLALITY: CPT

## 2020-05-09 PROCEDURE — G0378 HOSPITAL OBSERVATION PER HR: HCPCS

## 2020-05-09 PROCEDURE — 6370000000 HC RX 637 (ALT 250 FOR IP): Performed by: HOSPITALIST

## 2020-05-09 PROCEDURE — 6360000002 HC RX W HCPCS: Performed by: HOSPITALIST

## 2020-05-09 PROCEDURE — 36415 COLL VENOUS BLD VENIPUNCTURE: CPT

## 2020-05-09 PROCEDURE — 84300 ASSAY OF URINE SODIUM: CPT

## 2020-05-09 PROCEDURE — 83930 ASSAY OF BLOOD OSMOLALITY: CPT

## 2020-05-09 PROCEDURE — 94640 AIRWAY INHALATION TREATMENT: CPT

## 2020-05-09 PROCEDURE — 2580000003 HC RX 258: Performed by: HOSPITALIST

## 2020-05-09 PROCEDURE — 85025 COMPLETE CBC W/AUTO DIFF WBC: CPT

## 2020-05-09 PROCEDURE — 1200000000 HC SEMI PRIVATE

## 2020-05-09 PROCEDURE — 94761 N-INVAS EAR/PLS OXIMETRY MLT: CPT

## 2020-05-09 RX ORDER — PREDNISONE 1 MG/1
5 TABLET ORAL DAILY
Status: DISCONTINUED | OUTPATIENT
Start: 2020-05-09 | End: 2020-05-11 | Stop reason: HOSPADM

## 2020-05-09 RX ADMIN — ACETAMINOPHEN 650 MG: 325 TABLET, FILM COATED ORAL at 05:22

## 2020-05-09 RX ADMIN — Medication 2 PUFF: at 15:49

## 2020-05-09 RX ADMIN — Medication 2 PUFF: at 12:15

## 2020-05-09 RX ADMIN — ENOXAPARIN SODIUM 30 MG: 30 INJECTION SUBCUTANEOUS at 09:15

## 2020-05-09 RX ADMIN — Medication 10 ML: at 20:41

## 2020-05-09 RX ADMIN — AZITHROMYCIN MONOHYDRATE 500 MG: 500 INJECTION, POWDER, LYOPHILIZED, FOR SOLUTION INTRAVENOUS at 02:11

## 2020-05-09 RX ADMIN — Medication 2 PUFF: at 20:03

## 2020-05-09 RX ADMIN — VENLAFAXINE 75 MG: 37.5 TABLET ORAL at 09:15

## 2020-05-09 RX ADMIN — Medication 2 PUFF: at 12:14

## 2020-05-09 RX ADMIN — ACETAMINOPHEN 650 MG: 325 TABLET, FILM COATED ORAL at 20:41

## 2020-05-09 RX ADMIN — VENLAFAXINE 75 MG: 37.5 TABLET ORAL at 20:41

## 2020-05-09 RX ADMIN — Medication 2 PUFF: at 08:12

## 2020-05-09 RX ADMIN — Medication 1000 MCG: at 09:15

## 2020-05-09 RX ADMIN — HYDRALAZINE HYDROCHLORIDE 25 MG: 25 TABLET, FILM COATED ORAL at 14:44

## 2020-05-09 RX ADMIN — Medication 2 PUFF: at 20:02

## 2020-05-09 RX ADMIN — Medication 10 ML: at 09:16

## 2020-05-09 RX ADMIN — METHYLPREDNISOLONE SODIUM SUCCINATE 40 MG: 40 INJECTION, POWDER, FOR SOLUTION INTRAMUSCULAR; INTRAVENOUS at 05:22

## 2020-05-09 RX ADMIN — HYDRALAZINE HYDROCHLORIDE 25 MG: 25 TABLET, FILM COATED ORAL at 20:41

## 2020-05-09 RX ADMIN — PREDNISONE 5 MG: 5 TABLET ORAL at 14:44

## 2020-05-09 ASSESSMENT — PAIN SCALES - GENERAL
PAINLEVEL_OUTOF10: 1
PAINLEVEL_OUTOF10: 4
PAINLEVEL_OUTOF10: 4

## 2020-05-09 ASSESSMENT — PAIN DESCRIPTION - LOCATION
LOCATION: HEAD
LOCATION: HEAD

## 2020-05-09 ASSESSMENT — PAIN DESCRIPTION - PAIN TYPE
TYPE: ACUTE PAIN
TYPE: ACUTE PAIN

## 2020-05-09 NOTE — PROGRESS NOTES
Assessment complete. See flow sheet. Patient resting in bed quietly at this time. Complaints of headache at this time 4/10. PRN tylenol given as rx'd and awaiting results. Lung sounds diminished throughout all lobes. Occasional dry non-productive cough noted. Respirations easy and even. Denies needs at this time. The care plan and education has been reviewed and mutually agreed upon with the patient. Will monitor.

## 2020-05-09 NOTE — PROGRESS NOTES
05/06/20 2035 05/09/20  0644   AST 19 20   ALT 13 13   BILITOT <0.2 <0.2   ALKPHOS 143* 126         Problem List  Active Problems:    Asthma    Bipolar I disorder, single manic episode (HCC)    Recurrent major depressive disorder, in remission ([facility]    Diabetes mellitus type 2 in nonobese (HCC)    Tobacco use disorder    Essential hypertension    Venous insufficiency    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 (HCC)    Chronic constipation    COPD exacerbation (HCC)  Resolved Problems:    * No resolved hospital problems. *        Assessment & Plan:   1.stop Solu-Medrol, we will switch to prednisone by mouth Acapella continue as needed oxygen, continue with empiric azithromycin. 2.COVID-19 rule out, results pending continue droplet plus precautions  3. Acute respiratory failure with hypoxemia noted currently on 2 L of oxygen with a 97% SPO2  4. Blood pressure slightly uncontrolled, continue with metoprolol and hydralazine   5. History of depression, continue with Effexor  6. hyponatremia, patient consuming too much free water,  will check urine osmolality,Urine sodium level and serum osmolality  7.   She wants to go home     Diet: DIET CARDIAC;  Code:Full Code  DVT PPX thiago Perez MD   5/9/2020 11:22 AM

## 2020-05-10 LAB
OSMOLALITY URINE: 285 MOSM/KG (ref 390–1070)
SODIUM URINE: 41 MMOL/L

## 2020-05-10 PROCEDURE — 2580000003 HC RX 258: Performed by: HOSPITALIST

## 2020-05-10 PROCEDURE — G0378 HOSPITAL OBSERVATION PER HR: HCPCS

## 2020-05-10 PROCEDURE — 6370000000 HC RX 637 (ALT 250 FOR IP): Performed by: HOSPITALIST

## 2020-05-10 PROCEDURE — 94640 AIRWAY INHALATION TREATMENT: CPT

## 2020-05-10 PROCEDURE — 94761 N-INVAS EAR/PLS OXIMETRY MLT: CPT

## 2020-05-10 PROCEDURE — 6360000002 HC RX W HCPCS: Performed by: HOSPITALIST

## 2020-05-10 PROCEDURE — 1200000000 HC SEMI PRIVATE

## 2020-05-10 RX ADMIN — VENLAFAXINE 75 MG: 37.5 TABLET ORAL at 08:47

## 2020-05-10 RX ADMIN — Medication 1000 MCG: at 08:47

## 2020-05-10 RX ADMIN — AZITHROMYCIN MONOHYDRATE 500 MG: 500 INJECTION, POWDER, LYOPHILIZED, FOR SOLUTION INTRAVENOUS at 02:07

## 2020-05-10 RX ADMIN — Medication 10 ML: at 20:34

## 2020-05-10 RX ADMIN — Medication 2 PUFF: at 16:41

## 2020-05-10 RX ADMIN — Medication 2 PUFF: at 12:38

## 2020-05-10 RX ADMIN — Medication 2 PUFF: at 20:41

## 2020-05-10 RX ADMIN — VENLAFAXINE 75 MG: 37.5 TABLET ORAL at 20:34

## 2020-05-10 RX ADMIN — HYDRALAZINE HYDROCHLORIDE 25 MG: 25 TABLET, FILM COATED ORAL at 14:55

## 2020-05-10 RX ADMIN — HYDRALAZINE HYDROCHLORIDE 25 MG: 25 TABLET, FILM COATED ORAL at 20:34

## 2020-05-10 RX ADMIN — ENOXAPARIN SODIUM 30 MG: 30 INJECTION SUBCUTANEOUS at 08:46

## 2020-05-10 RX ADMIN — PREDNISONE 5 MG: 5 TABLET ORAL at 08:47

## 2020-05-10 RX ADMIN — Medication 10 ML: at 08:48

## 2020-05-10 NOTE — PROGRESS NOTES
Assessment complete. See flow sheet. Patient resting in bed quietly at this time. Complaints of headache at this time 4/10. PRN tylenol given as rx'd and awaiting results. Urine specimen obtained and sent to lab at this time. Lung sounds diminished throughout all lobes. Respirations easy and even. Denies needs at this time. The care plan and education has been reviewed and mutually agreed upon with the patient. Will monitor.

## 2020-05-10 NOTE — PROGRESS NOTES
[facility] PROGRESS NOTE    5/10/2020 8:16 AM        Name: Bee Valdivia . Admitted: 5/7/2020  Primary Care Provider: No primary care provider on file. (Tel: None)                        Hospital course:  Renetta Crowder is a 66 y. o. female who presented with SOB and Hypoxemia at home and no relief post Nebs at home X 1 . Unable to fill her scripts and supposed to be on Inhalers at [facility]. Not on Home O2  Reports cough w/ Mild Phlegm at home  No travel  No sick contacts  Recurrent exacerbations ? 2/2 infection vs Non compliance issues  ?? Suspected  No fever/chills  Still smoked 3-4 Cig/Day       Subjective: Patient has no complaint. No acute events overnight. Resting well. Pain control. Diet ok. Labs reviewed  Denies any chest pain sob.      Reviewed interval ancillary notes    Current Medications  predniSONE (DELTASONE) tablet 5 mg, Daily  hydrALAZINE (APRESOLINE) tablet 25 mg, TID  metoprolol succinate (TOPROL XL) extended release tablet 25 mg, Daily  venlafaxine (EFFEXOR) tablet 75 mg, BID  vitamin B-12 (CYANOCOBALAMIN) tablet 1,000 mcg, Daily  sodium chloride flush 0.9 % injection 10 mL, 2 times per day  sodium chloride flush 0.9 % injection 10 mL, PRN  acetaminophen (TYLENOL) tablet 650 mg, Q6H PRN    Or  acetaminophen (TYLENOL) suppository 650 mg, Q6H PRN  polyethylene glycol (GLYCOLAX) packet 17 g, Daily PRN  promethazine (PHENERGAN) tablet 12.5 mg, Q6H PRN    Or  ondansetron (ZOFRAN) injection 4 mg, Q6H PRN  enoxaparin (LOVENOX) injection 30 mg, Daily  nicotine (NICODERM CQ) 14 MG/24HR 1 patch, Daily  azithromycin (ZITHROMAX) 500 mg in D5W 250ml Vial Mate, Q24H  hydrALAZINE (APRESOLINE) injection 10 mg, Q6H PRN  albuterol sulfate  (90 Base) MCG/ACT inhaler 2 puff, Q2H PRN  albuterol sulfate  (90 Base) MCG/ACT inhaler 2 puff, Q4H WA  ipratropium (ATROVENT HFA) 17 MCG/ACT inhaler 2 puff, Q4H WA        Objective:  BP (!) 111/54   Pulse 68   Temp 98 °F (36.7 °C) (Oral)   Resp 18   Ht 5' 6\" (1.676 m)   Wt 127 lb 1.6 oz (57.7 kg)   SpO2 94%   BMI 20.51 kg/m²   No intake or output data in the 24 hours ending 05/10/20 0816   Wt Readings from Last 3 Encounters:   05/08/20 127 lb 1.6 oz (57.7 kg)   05/06/20 135 lb (61.2 kg)   03/05/20 131 lb 2 oz (59.5 kg)       I performed an audiovisual assessment from outside the patients room, based on new provisions and guidance offered by [facility] on March 18, 2020 in setting of COVID-19 outbreak and in order to preserve personal protective equipment in accordance with the flexibilities announced by CMS on March 30, 2020. References:   https://[employer] [place]. [place].[facility]/Portals/0/Resources/COVID-19/3_18%20Telemed%20Guidance%20Updated%20March%2018. pdf?rtd=[email or web address]   http://[employer]/. pdf      Bedside physical examination deferred. However, I did visually inspect the patient and observed the following:      General appearance: No apparent distress, appears stated age and cooperative. Neck: No visible masses or deformity  Respiratory:  Normal respiratory effort.   No wheezing today   Cardiovascular: Deferred. Abdomen: Deferred. Musculoskelatal: No visible deformities  Neurologic:  Deferred.   Psychiatric: Alert and oriented, thought content appropriate, normal insight  Skin: No visible rash    Labs and Tests:  CBC:   Recent Labs     05/07/20 2310 05/09/20  0644   WBC 10.9 9.6   HGB 11.5* 10.7*   HCT 35.4* 32.8*   MCV 95.0 93.8    284     BMP:    Recent Labs     05/07/20 2310 05/09/20  0644    130*   K 4.8 4.4    99   CO2 21 20*   BUN 30* 37*   CREATININE 1.6* 1.6*   GLUCOSE 92 133*     Hepatic:   Recent Labs     05/09/20  0644   AST 20   ALT 13   BILITOT <0.2   ALKPHOS 126         Problem List  Active Problems:    Asthma Bipolar I disorder, single manic episode (HCC)    Recurrent major depressive disorder, in remission ([facility] [facility]    Diabetes mellitus type 2 in nonobese ([facility] [facility]    Tobacco use disorder    Essential hypertension    Venous insufficiency    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 (HCC)    Chronic constipation    COPD exacerbation (HCC)  Resolved Problems:    * No resolved hospital problems. *      Assessment & Plan:   1.continue with  prednisone by mouth Acapella continue as needed oxygen, continue with empiric azithromycin. 2.COVID-19 rule out, results pending continue droplet plus precautions  3. Acute respiratory failure with hypoxemia noted currently on 2 L of oxygen with a 97% SPO2  4. Blood pressure slightly uncontrolled, continue with metoprolol and hydralazine   5. History of depression, continue with Effexor  6. hyponatremia, patient consuming too much free water,  will check urine osmolality,Urine sodium level and serum osmolality  7.   She wants to go home awaiting COVID-19 results      Diet: DIET CARDIAC;  Code:Full Code  DVT PPX lovenox       George Mike MD   5/10/2020 8:16 AM

## 2020-05-11 VITALS
HEART RATE: 75 BPM | OXYGEN SATURATION: 96 % | DIASTOLIC BLOOD PRESSURE: 64 MMHG | BODY MASS INDEX: 20.63 KG/M2 | TEMPERATURE: 97.9 F | WEIGHT: 128.4 LBS | RESPIRATION RATE: 16 BRPM | SYSTOLIC BLOOD PRESSURE: 133 MMHG | HEIGHT: 66 IN

## 2020-05-11 LAB
ANION GAP SERPL CALCULATED.3IONS-SCNC: 7 MMOL/L (ref 3–16)
BUN BLDV-MCNC: 45 MG/DL (ref 7–20)
CALCIUM SERPL-MCNC: 8.1 MG/DL (ref 8.3–10.6)
CHLORIDE BLD-SCNC: 101 MMOL/L (ref 99–110)
CO2: 22 MMOL/L (ref 21–32)
CREAT SERPL-MCNC: 1.7 MG/DL (ref 0.6–1.2)
GFR AFRICAN AMERICAN: 35
GFR NON-AFRICAN AMERICAN: 29
GLUCOSE BLD-MCNC: 89 MG/DL (ref 70–99)
HCT VFR BLD CALC: 33.9 % (ref 36–48)
HEMOGLOBIN: 11 G/DL (ref 12–16)
MCH RBC QN AUTO: 30.2 PG (ref 26–34)
MCHC RBC AUTO-ENTMCNC: 32.4 G/DL (ref 31–36)
MCV RBC AUTO: 93.1 FL (ref 80–100)
PDW BLD-RTO: 13.7 % (ref 12.4–15.4)
PLATELET # BLD: 281 K/UL (ref 135–450)
PMV BLD AUTO: 10.1 FL (ref 5–10.5)
POTASSIUM SERPL-SCNC: 4.7 MMOL/L (ref 3.5–5.1)
RBC # BLD: 3.64 M/UL (ref 4–5.2)
REPORT: NORMAL
SARS-COV-2: NOT DETECTED
SODIUM BLD-SCNC: 130 MMOL/L (ref 136–145)
THIS TEST SENT TO: NORMAL
WBC # BLD: 7.7 K/UL (ref 4–11)

## 2020-05-11 PROCEDURE — 36415 COLL VENOUS BLD VENIPUNCTURE: CPT

## 2020-05-11 PROCEDURE — 94640 AIRWAY INHALATION TREATMENT: CPT

## 2020-05-11 PROCEDURE — 85027 COMPLETE CBC AUTOMATED: CPT

## 2020-05-11 PROCEDURE — 6370000000 HC RX 637 (ALT 250 FOR IP): Performed by: HOSPITALIST

## 2020-05-11 PROCEDURE — 2580000003 HC RX 258: Performed by: HOSPITALIST

## 2020-05-11 PROCEDURE — 94761 N-INVAS EAR/PLS OXIMETRY MLT: CPT

## 2020-05-11 PROCEDURE — 6360000002 HC RX W HCPCS: Performed by: HOSPITALIST

## 2020-05-11 PROCEDURE — 80048 BASIC METABOLIC PNL TOTAL CA: CPT

## 2020-05-11 RX ORDER — PREDNISONE 1 MG/1
TABLET ORAL
Qty: 4 TABLET | Refills: 0 | Status: SHIPPED | OUTPATIENT
Start: 2020-05-12 | End: 2020-05-18

## 2020-05-11 RX ADMIN — PREDNISONE 5 MG: 5 TABLET ORAL at 08:45

## 2020-05-11 RX ADMIN — Medication 2 PUFF: at 16:33

## 2020-05-11 RX ADMIN — Medication 2 PUFF: at 09:36

## 2020-05-11 RX ADMIN — Medication 2 PUFF: at 16:32

## 2020-05-11 RX ADMIN — Medication 2 PUFF: at 12:53

## 2020-05-11 RX ADMIN — HYDRALAZINE HYDROCHLORIDE 25 MG: 25 TABLET, FILM COATED ORAL at 13:31

## 2020-05-11 RX ADMIN — Medication 1000 MCG: at 08:44

## 2020-05-11 RX ADMIN — VENLAFAXINE 75 MG: 37.5 TABLET ORAL at 08:44

## 2020-05-11 RX ADMIN — METOPROLOL SUCCINATE 25 MG: 25 TABLET, FILM COATED, EXTENDED RELEASE ORAL at 08:44

## 2020-05-11 RX ADMIN — ENOXAPARIN SODIUM 30 MG: 30 INJECTION SUBCUTANEOUS at 08:46

## 2020-05-11 RX ADMIN — AZITHROMYCIN MONOHYDRATE 500 MG: 500 INJECTION, POWDER, LYOPHILIZED, FOR SOLUTION INTRAVENOUS at 04:57

## 2020-05-11 RX ADMIN — Medication 2 PUFF: at 12:52

## 2020-05-11 ASSESSMENT — PAIN SCALES - GENERAL
PAINLEVEL_OUTOF10: 0

## 2020-05-11 NOTE — CARE COORDINATION
Patient will follow with Dr Mona Figueredo on discharge.     Malinda Gonzales, BSN, CCM, RN  [facility]  [phone]

## 2020-05-11 NOTE — DISCHARGE SUMMARY
Component Value Date    INR 1.01 11/01/2019    INR 1.04 03/22/2019    INR 1.03 03/22/2017       Radiology:  Xr Chest Standard (2 Vw)    Result Date: 5/7/2020  EXAMINATION: TWO XRAY VIEWS OF THE CHEST 5/7/2020 11:04 pm COMPARISON: 05/06/2020 HISTORY: ORDERING SYSTEM PROVIDED HISTORY: Shortness of breath TECHNOLOGIST PROVIDED HISTORY: Reason for exam:->Shortness of breath FINDINGS: 4 images submitted for review. The lungs are hyperinflated and hyperlucent but otherwise clear. No pneumothorax or pleural effusion is seen. Cardiac and mediastinal contours are stable. No acute osseous abnormality is evident. Upper abdominal surgical clips are present. 1. No acute cardiopulmonary abnormality. 2. Stable findings of emphysema and chronic obstructive physiology. Xr Chest Portable    Result Date: 5/7/2020  EXAMINATION: ONE XRAY VIEW OF THE CHEST 5/6/2020 7:27 pm COMPARISON: 03/05/2020, 01/14/2020, 11/30/2008 HISTORY: ORDERING SYSTEM PROVIDED HISTORY: short of breath TECHNOLOGIST PROVIDED HISTORY: Reason for exam:->short of breath Reason for Exam: SOB today Acuity: Acute Type of Exam: Initial FINDINGS: A single frontal view of the chest was obtained. There is no acute skeletal abnormality. There are chronic healed rib fractures. The heart size is stable, and at the upper limits of normal.  The mediastinal contours are within normal limits. There is stable biapical pleural and parenchymal scarring. The lungs are otherwise clear, without evidence of acute airspace consolidation, pneumothorax, or pleural effusion. No acute cardiopulmonary disease. The patient was seen and examined on day of discharge and this discharge summary is in conjunction with any daily progress note from day of discharge. Time Spent on discharge is 30 minutes  in the examination, evaluation, counseling and review of medications and discharge plan.             Signed:    Liana Coreas MD   5/11/2020      Thank you No primary care provider on file. for the opportunity to be involved in this patient's care.  If you have any questions or concerns please feel free to contact me at

## 2020-05-12 ENCOUNTER — CARE COORDINATION (OUTPATIENT)
Dept: CASE MANAGEMENT | Age: 75
End: 2020-05-12

## 2020-05-19 ENCOUNTER — CARE COORDINATION (OUTPATIENT)
Dept: CASE MANAGEMENT | Age: 75
End: 2020-05-19

## 2020-06-14 ENCOUNTER — HOSPITAL ENCOUNTER (EMERGENCY)
Age: 75
Discharge: HOME | End: 2020-06-14
Attending: EMERGENCY MEDICINE
Payer: MEDICARE

## 2020-06-14 ENCOUNTER — APPOINTMENT (OUTPATIENT)
Dept: GENERAL RADIOLOGY | Age: 75
End: 2020-06-14
Payer: MEDICARE

## 2020-06-14 VITALS
HEART RATE: 73 BPM | DIASTOLIC BLOOD PRESSURE: 47 MMHG | RESPIRATION RATE: 17 BRPM | TEMPERATURE: 98.1 F | OXYGEN SATURATION: 96 % | HEIGHT: 66 IN | WEIGHT: 140 LBS | BODY MASS INDEX: 22.5 KG/M2 | SYSTOLIC BLOOD PRESSURE: 132 MMHG

## 2020-06-14 LAB
A/G RATIO: 0.8 (ref 1.1–2.2)
ALBUMIN SERPL-MCNC: 3.7 G/DL (ref 3.4–5)
ALP BLD-CCNC: 169 U/L (ref 40–129)
ALT SERPL-CCNC: 13 U/L (ref 10–40)
AMYLASE: 50 U/L (ref 25–115)
ANION GAP SERPL CALCULATED.3IONS-SCNC: 9 MMOL/L (ref 3–16)
AST SERPL-CCNC: 25 U/L (ref 15–37)
BASE EXCESS VENOUS: -0.6 MMOL/L (ref -3–3)
BASOPHILS ABSOLUTE: 0.1 K/UL (ref 0–0.2)
BASOPHILS RELATIVE PERCENT: 1.2 %
BILIRUB SERPL-MCNC: <0.2 MG/DL (ref 0–1)
BUN BLDV-MCNC: 32 MG/DL (ref 7–20)
CALCIUM SERPL-MCNC: 8.8 MG/DL (ref 8.3–10.6)
CARBOXYHEMOGLOBIN: 2.9 % (ref 0–1.5)
CHLORIDE BLD-SCNC: 105 MMOL/L (ref 99–110)
CO2: 24 MMOL/L (ref 21–32)
CREAT SERPL-MCNC: 1.9 MG/DL (ref 0.6–1.2)
EKG ATRIAL RATE: 76 BPM
EKG DIAGNOSIS: NORMAL
EKG P AXIS: 65 DEGREES
EKG P-R INTERVAL: 170 MS
EKG Q-T INTERVAL: 382 MS
EKG QRS DURATION: 94 MS
EKG QTC CALCULATION (BAZETT): 429 MS
EKG R AXIS: 50 DEGREES
EKG T AXIS: 75 DEGREES
EKG VENTRICULAR RATE: 76 BPM
EOSINOPHILS ABSOLUTE: 0.5 K/UL (ref 0–0.6)
EOSINOPHILS RELATIVE PERCENT: 8 %
GFR AFRICAN AMERICAN: 31
GFR NON-AFRICAN AMERICAN: 26
GLOBULIN: 4.4 G/DL
GLUCOSE BLD-MCNC: 113 MG/DL (ref 70–99)
HCO3 VENOUS: 23.9 MMOL/L (ref 23–29)
HCT VFR BLD CALC: 36.9 % (ref 36–48)
HEMOGLOBIN, VEN, REDUCED: 2 %
HEMOGLOBIN: 11.7 G/DL (ref 12–16)
LIPASE: 46 U/L (ref 13–60)
LYMPHOCYTES ABSOLUTE: 1.6 K/UL (ref 1–5.1)
LYMPHOCYTES RELATIVE PERCENT: 24.7 %
MCH RBC QN AUTO: 29.7 PG (ref 26–34)
MCHC RBC AUTO-ENTMCNC: 31.6 G/DL (ref 31–36)
MCV RBC AUTO: 93.9 FL (ref 80–100)
METHEMOGLOBIN VENOUS: 0.3 %
MONOCYTES ABSOLUTE: 0.6 K/UL (ref 0–1.3)
MONOCYTES RELATIVE PERCENT: 9.1 %
NEUTROPHILS ABSOLUTE: 3.6 K/UL (ref 1.7–7.7)
NEUTROPHILS RELATIVE PERCENT: 57 %
O2 CONTENT, VEN: 14 VOL %
O2 SAT, VEN: 98 %
O2 THERAPY: ABNORMAL
PCO2, VEN: 37.6 MMHG (ref 40–50)
PDW BLD-RTO: 14.4 % (ref 12.4–15.4)
PH VENOUS: 7.41 (ref 7.35–7.45)
PLATELET # BLD: 304 K/UL (ref 135–450)
PMV BLD AUTO: 10.3 FL (ref 5–10.5)
PO2, VEN: 87.1 MMHG (ref 25–40)
POTASSIUM REFLEX MAGNESIUM: 4.5 MMOL/L (ref 3.5–5.1)
PRO-BNP: 2686 PG/ML (ref 0–449)
RBC # BLD: 3.93 M/UL (ref 4–5.2)
SODIUM BLD-SCNC: 138 MMOL/L (ref 136–145)
TCO2 CALC VENOUS: 56 MMOL/L
TOTAL PROTEIN: 8.1 G/DL (ref 6.4–8.2)
TROPONIN: <0.01 NG/ML
WBC # BLD: 6.3 K/UL (ref 4–11)

## 2020-06-14 PROCEDURE — 83690 ASSAY OF LIPASE: CPT

## 2020-06-14 PROCEDURE — 85025 COMPLETE CBC W/AUTO DIFF WBC: CPT

## 2020-06-14 PROCEDURE — 80053 COMPREHEN METABOLIC PANEL: CPT

## 2020-06-14 PROCEDURE — 96375 TX/PRO/DX INJ NEW DRUG ADDON: CPT

## 2020-06-14 PROCEDURE — 6360000002 HC RX W HCPCS: Performed by: EMERGENCY MEDICINE

## 2020-06-14 PROCEDURE — 94640 AIRWAY INHALATION TREATMENT: CPT

## 2020-06-14 PROCEDURE — 71045 X-RAY EXAM CHEST 1 VIEW: CPT

## 2020-06-14 PROCEDURE — 99285 EMERGENCY DEPT VISIT HI MDM: CPT

## 2020-06-14 PROCEDURE — 93005 ELECTROCARDIOGRAM TRACING: CPT | Performed by: EMERGENCY MEDICINE

## 2020-06-14 PROCEDURE — 36415 COLL VENOUS BLD VENIPUNCTURE: CPT

## 2020-06-14 PROCEDURE — 6370000000 HC RX 637 (ALT 250 FOR IP): Performed by: EMERGENCY MEDICINE

## 2020-06-14 PROCEDURE — 82803 BLOOD GASES ANY COMBINATION: CPT

## 2020-06-14 PROCEDURE — 94760 N-INVAS EAR/PLS OXIMETRY 1: CPT

## 2020-06-14 PROCEDURE — 83880 ASSAY OF NATRIURETIC PEPTIDE: CPT

## 2020-06-14 PROCEDURE — 2580000003 HC RX 258: Performed by: EMERGENCY MEDICINE

## 2020-06-14 PROCEDURE — 96365 THER/PROPH/DIAG IV INF INIT: CPT

## 2020-06-14 PROCEDURE — 82150 ASSAY OF AMYLASE: CPT

## 2020-06-14 PROCEDURE — 84484 ASSAY OF TROPONIN QUANT: CPT

## 2020-06-14 PROCEDURE — 93010 ELECTROCARDIOGRAM REPORT: CPT | Performed by: INTERNAL MEDICINE

## 2020-06-14 RX ORDER — IPRATROPIUM BROMIDE AND ALBUTEROL SULFATE 2.5; .5 MG/3ML; MG/3ML
1 SOLUTION RESPIRATORY (INHALATION) ONCE
Status: COMPLETED | OUTPATIENT
Start: 2020-06-14 | End: 2020-06-14

## 2020-06-14 RX ORDER — 0.9 % SODIUM CHLORIDE 0.9 %
1000 INTRAVENOUS SOLUTION INTRAVENOUS ONCE
Status: COMPLETED | OUTPATIENT
Start: 2020-06-14 | End: 2020-06-14

## 2020-06-14 RX ORDER — MAGNESIUM SULFATE IN WATER 40 MG/ML
2 INJECTION, SOLUTION INTRAVENOUS ONCE
Status: COMPLETED | OUTPATIENT
Start: 2020-06-14 | End: 2020-06-14

## 2020-06-14 RX ORDER — PREDNISONE 20 MG/1
40 TABLET ORAL DAILY
Qty: 10 TABLET | Refills: 0 | Status: SHIPPED | OUTPATIENT
Start: 2020-06-14 | End: 2020-06-19

## 2020-06-14 RX ORDER — AZITHROMYCIN 250 MG/1
250 TABLET, FILM COATED ORAL SEE ADMIN INSTRUCTIONS
Qty: 6 TABLET | Refills: 0 | Status: SHIPPED | OUTPATIENT
Start: 2020-06-14 | End: 2020-06-19

## 2020-06-14 RX ORDER — METHYLPREDNISOLONE SODIUM SUCCINATE 125 MG/2ML
125 INJECTION, POWDER, LYOPHILIZED, FOR SOLUTION INTRAMUSCULAR; INTRAVENOUS ONCE
Status: COMPLETED | OUTPATIENT
Start: 2020-06-14 | End: 2020-06-14

## 2020-06-14 RX ADMIN — SODIUM CHLORIDE 1000 ML: 9 INJECTION, SOLUTION INTRAVENOUS at 05:50

## 2020-06-14 RX ADMIN — IPRATROPIUM BROMIDE AND ALBUTEROL SULFATE 1 AMPULE: .5; 3 SOLUTION RESPIRATORY (INHALATION) at 05:43

## 2020-06-14 RX ADMIN — METHYLPREDNISOLONE SODIUM SUCCINATE 125 MG: 125 INJECTION, POWDER, FOR SOLUTION INTRAMUSCULAR; INTRAVENOUS at 05:50

## 2020-06-14 RX ADMIN — MAGNESIUM SULFATE IN WATER 2 G: 40 INJECTION, SOLUTION INTRAVENOUS at 05:50

## 2020-06-14 ASSESSMENT — PAIN SCALES - GENERAL: PAINLEVEL_OUTOF10: 3

## 2020-06-14 ASSESSMENT — PAIN DESCRIPTION - ORIENTATION: ORIENTATION: RIGHT

## 2020-06-14 ASSESSMENT — PAIN DESCRIPTION - FREQUENCY: FREQUENCY: INTERMITTENT

## 2020-06-14 ASSESSMENT — PAIN DESCRIPTION - LOCATION: LOCATION: CHEST

## 2020-06-14 ASSESSMENT — PAIN DESCRIPTION - PAIN TYPE: TYPE: ACUTE PAIN

## 2020-06-14 NOTE — ED PROVIDER NOTES
[facility]        Pt Name: Shalonda Oakes  MRN: [id number]  [place] 1945  Date of evaluation: 6/14/2020  Provider: Merl Fothergill, MD  PCP: No primary care provider on file. This patient was seen and evaluated by the attending physician Merl Fothergill, MD.      [address] [place] [address]       Chief Complaint   Patient presents with    Shortness of Breath     Patient arrived by [facility] for SOB that started today. Pt with h/o emphasema. Patient with productive cough. HISTORY OF PRESENT ILLNESS   (Location/Symptom, Timing/Onset, Context/Setting, Quality, Duration, Modifying Factors, Severity)  Note limiting factors. Shalonda Oakes is a 66 y.o. female presenting today for a CC of dyspnea. Hx of Emphysema. Woke up dyspneic early this morning  EMS gave nebs and transported; now improved. No fever, chills, sweats. No n/v/d. Nursing Notes were all reviewed and agreed with or any disagreements were addressed  in the HPI. REVIEW OF SYSTEMS    (2-9 systems for level 4, 10 or more for level 5)     Review of Systems    Positives and Pertinent negatives as per HPI. Except as noted abovein the ROS, all other systems were reviewed and negative.        PAST MEDICAL HISTORY     Past Medical History:   Diagnosis Date    Arthritis     Asthma     Bipolar 1 disorder ([address]     Cancer ([facility]     bladder    COPD (chronic obstructive pulmonary disease) ([facility]     Depression     controlled w/meds    Diabetes mellitus (HCC)     diet control    Hypertension     Neuropathy     toes    Obesity, unspecified     Osteoarthrosis, unspecified whether generalized or localized, lower leg     PVD (peripheral vascular disease) ([address]     Tobacco use disorder     Unspecified cataract          SURGICAL HISTORY     Past Surgical History:   Procedure Laterality Date    ABDOMEN SURGERY      BLADDER REMOVAL      partial    CHOLECYSTECTOMY PHYSICAL EXAM    (up to 7 for level 4, 8 or more for level 5)     ED Triage Vitals   BP Temp Temp Source Pulse Resp SpO2 Height Weight   06/14/20 0453 06/14/20 0453 06/14/20 0453 06/14/20 0453 -- 06/14/20 0453 06/14/20 0456 06/14/20 0456   (!) 149/58 98.1 °F (36.7 °C) Oral 75  95 % 5' 6\" (1.676 m) 140 lb (63.5 kg)       Physical Exam     General Appearance:  Alert, cooperative, no distress, appears stated age. Head:  Normocephalic, without obvious abnormality, atraumatic. Eyes:  conjunctiva/corneas clear, EOM's intact. Sclera anicteric. ENT: Mucous membranes moist.   Neck: Supple, symmetrical, trachea midline, no adenopathy. No jugular venous distention. Lungs:   No Respiratory Distress. Diffuse scattered wheezing. Chest Wall:     Heart:  RRR no m/c/g/r/   Abdomen:   Soft, NT, ND   Extremities:  Full range of motion. Pulses: Symmetric x4   Skin:  No rashes or lesions to exposed skin. Neurologic: Alert and oriented X 3. Motor grossly normal.  Speech clear.           DIAGNOSTIC RESULTS   LABS:    Labs Reviewed   CBC WITH AUTO DIFFERENTIAL - Abnormal; Notable for the following components:       Result Value    RBC 3.93 (*)     Hemoglobin 11.7 (*)     All other components within normal limits    Narrative:     Performed at:  [facility] [address], [place], [address] [employer]   Phone [phone]   COMPREHENSIVE METABOLIC PANEL W/ REFLEX TO MG FOR LOW K - Abnormal; Notable for the following components:    Glucose 113 (*)     BUN 32 (*)     CREATININE 1.9 (*)     GFR Non- 26 (*)     GFR African American 31 (*)     Albumin/Globulin Ratio 0.8 (*)     Alkaline Phosphatase 169 (*)     All other components within normal limits    Narrative:     Performed at:  [facility]  [address] [facility], [facility] [employer]   Phone [phone]   BRAIN NATRIURETIC PEPTIDE - Abnormal; Notable for the following components:    Pro-BNP 2,686 (*)     All other components within normal limits    Narrative:     Performed at:  [facility] [address] [facility]  [place], [place] [employer]   Phone [phone]   BLOOD GAS, VENOUS - Abnormal; Notable for the following components:    pCO2, [place] 37.6 (*)     pO2, [place] 87.1 (*)     Carboxyhemoglobin 2.9 (*)     All other components within normal limits    Narrative:     Performed at:  [facility] [address] [facility]  [place], [address] [employer]   Phone [phone]   LIPASE    Narrative:     Performed at:  [facility] [address],  [place], [address] [employer]   Phone [phone]   TROPONIN    Narrative:     Performed at:  [facility] [address], [place], [address] [employer]   Phone [phone]   AMYLASE    Narrative:     Performed at:  [facility] [address]  [place], [facility] [employer]   Phone [phone]       All other labs were within normal range or not returned as of this dictation. EKG: All EKG's are interpreted by the Emergency Department Physician in the absence of a cardiologist.  Please see their note for interpretation of EKG. EKG reviewed myself. Dated today at 0500. Rate 76 normal sinus rhythm. There is nonspecific Q wave changes diffusely. Peaked T's. Appears to be unchanged compared to 7 May 2020. RADIOLOGY:   Non-plain film images such as CT, Ultrasound and MRI are read by the radiologist. Plain radiographic images are visualized andpreliminarily interpreted by the  ED Provider with the below findings:        Interpretation [facility] Radiologist below, if available at the time of this note:    XR CHEST PORTABLE    (Results Pending)     No results found.         PROCEDURES   Unless otherwise noted below, none     Procedures    CRITICAL CARE TIME   N/A    CONSULTS:  None      EMERGENCY DEPARTMENT COURSE and DIFFERENTIAL DIAGNOSIS/MDM: to edit the dictations but occasionally words are mis-transcribed.)    Dot Hodges MD (electronically signed)             Dot Hodges MD  06/14/20 9927

## 2020-06-14 NOTE — ED NOTES
Discharge instructions reviewed with pt. Pt verbalized understanding. Peripheral IV removed without complications. Pt calling family member for ride.       Eric Smith RN  06/14/20 8266

## 2020-06-15 ENCOUNTER — CARE COORDINATION (OUTPATIENT)
Dept: CARE COORDINATION | Age: 75
End: 2020-06-15

## 2020-06-15 NOTE — CARE COORDINATION
Patient contacted regarding Chary Knapp. Discussed COVID-19 related testing which was not done at this time. Test results were not done. Patient informed of results, if available? N/A    Care Transition Nurse/ Ambulatory Care Manager contacted the patient by telephone to perform post discharge assessment. Verified name and  with patient as identifiers. Provided introduction to self, and explanation of the CTN/ACM role, and reason for call due to risk factors for infection and/or exposure to COVID-19. Symptoms reviewed with patient who verbalized the following symptoms: cough, shortness of breath, no new symptoms and no worsening symptoms. Due to no new or worsening symptoms encounter was not routed to provider for escalation. Discussed follow-up appointments. If no appointment was previously scheduled, appointment scheduling offered: Yes, Pt reports that she use to see Dr. Sarah Dickerson and plans to call his office. [facility] follow up appointment(s): No future appointments. Non-[facility] follow up appointment(s): N/A     Patient has following risk factors of: COPD, asthma and diabetes. CTN/ACM reviewed discharge instructions, medical action plan and red flags such as increased shortness of breath, increasing fever and signs of decompensation with patient who verbalized understanding. Discussed exposure protocols and quarantine with CDC Guidelines What to do if you are sick with coronavirus disease .  Patient was given an opportunity for questions and concerns. The patient agrees to contact the  PCP office for questions related to their healthcare. CTN/ACM provided contact information for future needs. Reviewed and educated patient on any new and changed medications related to discharge diagnosis     Patient/family/caregiver given information for [facility] and agrees to enroll yes  Patient's preferred [email or web address]  Patient's preferred phone number: [phone] (H)  Based on Loop alert

## 2020-06-23 ENCOUNTER — HOSPITAL ENCOUNTER (INPATIENT)
Age: 75
LOS: 2 days | Discharge: HOME | DRG: 191 | End: 2020-06-25
Attending: EMERGENCY MEDICINE | Admitting: INTERNAL MEDICINE
Payer: MEDICARE

## 2020-06-23 ENCOUNTER — APPOINTMENT (OUTPATIENT)
Dept: NUCLEAR MEDICINE | Age: 75
DRG: 191 | End: 2020-06-23
Payer: MEDICARE

## 2020-06-23 ENCOUNTER — APPOINTMENT (OUTPATIENT)
Dept: GENERAL RADIOLOGY | Age: 75
DRG: 191 | End: 2020-06-23
Payer: MEDICARE

## 2020-06-23 LAB
A/G RATIO: 0.8 (ref 1.1–2.2)
A/G RATIO: 0.9 (ref 1.1–2.2)
ALBUMIN SERPL-MCNC: 3.2 G/DL (ref 3.4–5)
ALBUMIN SERPL-MCNC: 3.3 G/DL (ref 3.4–5)
ALP BLD-CCNC: 136 U/L (ref 40–129)
ALP BLD-CCNC: 143 U/L (ref 40–129)
ALT SERPL-CCNC: 14 U/L (ref 10–40)
ALT SERPL-CCNC: 7 U/L (ref 10–40)
ANION GAP SERPL CALCULATED.3IONS-SCNC: 12 MMOL/L (ref 3–16)
ANION GAP SERPL CALCULATED.3IONS-SCNC: 9 MMOL/L (ref 3–16)
APTT: 27.9 SEC (ref 24.2–36.2)
AST SERPL-CCNC: 20 U/L (ref 15–37)
AST SERPL-CCNC: 22 U/L (ref 15–37)
BASOPHILS ABSOLUTE: 0 K/UL (ref 0–0.2)
BASOPHILS ABSOLUTE: 0 K/UL (ref 0–0.2)
BASOPHILS RELATIVE PERCENT: 0.2 %
BASOPHILS RELATIVE PERCENT: 0.6 %
BILIRUB SERPL-MCNC: <0.2 MG/DL (ref 0–1)
BILIRUB SERPL-MCNC: <0.2 MG/DL (ref 0–1)
BUN BLDV-MCNC: 33 MG/DL (ref 7–20)
BUN BLDV-MCNC: 36 MG/DL (ref 7–20)
CALCIUM SERPL-MCNC: 8.4 MG/DL (ref 8.3–10.6)
CALCIUM SERPL-MCNC: 8.5 MG/DL (ref 8.3–10.6)
CHLORIDE BLD-SCNC: 105 MMOL/L (ref 99–110)
CHLORIDE BLD-SCNC: 106 MMOL/L (ref 99–110)
CO2: 18 MMOL/L (ref 21–32)
CO2: 23 MMOL/L (ref 21–32)
CREAT SERPL-MCNC: 1.9 MG/DL (ref 0.6–1.2)
CREAT SERPL-MCNC: 2.1 MG/DL (ref 0.6–1.2)
D DIMER: 537 NG/ML DDU (ref 0–229)
D DIMER: 597 NG/ML DDU (ref 0–229)
EOSINOPHILS ABSOLUTE: 0 K/UL (ref 0–0.6)
EOSINOPHILS ABSOLUTE: 0.5 K/UL (ref 0–0.6)
EOSINOPHILS RELATIVE PERCENT: 0.1 %
EOSINOPHILS RELATIVE PERCENT: 9 %
FIBRINOGEN: 347 MG/DL (ref 200–397)
GFR AFRICAN AMERICAN: 28
GFR AFRICAN AMERICAN: 31
GFR NON-AFRICAN AMERICAN: 23
GFR NON-AFRICAN AMERICAN: 26
GLOBULIN: 3.6 G/DL
GLOBULIN: 3.9 G/DL
GLUCOSE BLD-MCNC: 101 MG/DL (ref 70–99)
GLUCOSE BLD-MCNC: 289 MG/DL (ref 70–99)
HCT VFR BLD CALC: 32.9 % (ref 36–48)
HCT VFR BLD CALC: 33.5 % (ref 36–48)
HEMOGLOBIN: 10.5 G/DL (ref 12–16)
HEMOGLOBIN: 10.6 G/DL (ref 12–16)
INR BLD: 0.94 (ref 0.86–1.14)
LACTATE DEHYDROGENASE: 197 U/L (ref 100–190)
LYMPHOCYTES ABSOLUTE: 0.2 K/UL (ref 1–5.1)
LYMPHOCYTES ABSOLUTE: 1.6 K/UL (ref 1–5.1)
LYMPHOCYTES RELATIVE PERCENT: 28.5 %
LYMPHOCYTES RELATIVE PERCENT: 3.7 %
MCH RBC QN AUTO: 29.1 PG (ref 26–34)
MCH RBC QN AUTO: 29.9 PG (ref 26–34)
MCHC RBC AUTO-ENTMCNC: 31.5 G/DL (ref 31–36)
MCHC RBC AUTO-ENTMCNC: 32.3 G/DL (ref 31–36)
MCV RBC AUTO: 92.4 FL (ref 80–100)
MCV RBC AUTO: 92.8 FL (ref 80–100)
MONOCYTES ABSOLUTE: 0 K/UL (ref 0–1.3)
MONOCYTES ABSOLUTE: 0.5 K/UL (ref 0–1.3)
MONOCYTES RELATIVE PERCENT: 0.6 %
MONOCYTES RELATIVE PERCENT: 9.7 %
NEUTROPHILS ABSOLUTE: 2.9 K/UL (ref 1.7–7.7)
NEUTROPHILS ABSOLUTE: 4.6 K/UL (ref 1.7–7.7)
NEUTROPHILS RELATIVE PERCENT: 52.2 %
NEUTROPHILS RELATIVE PERCENT: 95.4 %
PDW BLD-RTO: 14.3 % (ref 12.4–15.4)
PDW BLD-RTO: 14.3 % (ref 12.4–15.4)
PLATELET # BLD: 220 K/UL (ref 135–450)
PLATELET # BLD: 231 K/UL (ref 135–450)
PMV BLD AUTO: 10.1 FL (ref 5–10.5)
PMV BLD AUTO: 10.4 FL (ref 5–10.5)
POTASSIUM REFLEX MAGNESIUM: 4.7 MMOL/L (ref 3.5–5.1)
POTASSIUM REFLEX MAGNESIUM: 4.8 MMOL/L (ref 3.5–5.1)
PRO-BNP: 1339 PG/ML (ref 0–449)
PROCALCITONIN: 0.06 NG/ML (ref 0–0.15)
PROTHROMBIN TIME: 10.9 SEC (ref 10–13.2)
RBC # BLD: 3.54 M/UL (ref 4–5.2)
RBC # BLD: 3.62 M/UL (ref 4–5.2)
SODIUM BLD-SCNC: 135 MMOL/L (ref 136–145)
SODIUM BLD-SCNC: 138 MMOL/L (ref 136–145)
TOTAL PROTEIN: 6.8 G/DL (ref 6.4–8.2)
TOTAL PROTEIN: 7.2 G/DL (ref 6.4–8.2)
TROPONIN: 0.02 NG/ML
TROPONIN: 0.03 NG/ML
TROPONIN: 0.04 NG/ML
TROPONIN: 0.06 NG/ML
WBC # BLD: 4.8 K/UL (ref 4–11)
WBC # BLD: 5.5 K/UL (ref 4–11)

## 2020-06-23 PROCEDURE — 85379 FIBRIN DEGRADATION QUANT: CPT

## 2020-06-23 PROCEDURE — 83880 ASSAY OF NATRIURETIC PEPTIDE: CPT

## 2020-06-23 PROCEDURE — 6370000000 HC RX 637 (ALT 250 FOR IP): Performed by: EMERGENCY MEDICINE

## 2020-06-23 PROCEDURE — 36415 COLL VENOUS BLD VENIPUNCTURE: CPT

## 2020-06-23 PROCEDURE — 85384 FIBRINOGEN ACTIVITY: CPT

## 2020-06-23 PROCEDURE — 6370000000 HC RX 637 (ALT 250 FOR IP): Performed by: INTERNAL MEDICINE

## 2020-06-23 PROCEDURE — 6360000002 HC RX W HCPCS: Performed by: INTERNAL MEDICINE

## 2020-06-23 PROCEDURE — U0003 INFECTIOUS AGENT DETECTION BY NUCLEIC ACID (DNA OR RNA); SEVERE ACUTE RESPIRATORY SYNDROME CORONAVIRUS 2 (SARS-COV-2) (CORONAVIRUS DISEASE [COVID-19]), AMPLIFIED PROBE TECHNIQUE, MAKING USE OF HIGH THROUGHPUT TECHNOLOGIES AS DESCRIBED BY CMS-2020-01-R: HCPCS

## 2020-06-23 PROCEDURE — 71045 X-RAY EXAM CHEST 1 VIEW: CPT

## 2020-06-23 PROCEDURE — 84145 PROCALCITONIN (PCT): CPT

## 2020-06-23 PROCEDURE — U0002 COVID-19 LAB TEST NON-CDC: HCPCS

## 2020-06-23 PROCEDURE — 94640 AIRWAY INHALATION TREATMENT: CPT

## 2020-06-23 PROCEDURE — 80053 COMPREHEN METABOLIC PANEL: CPT

## 2020-06-23 PROCEDURE — 85730 THROMBOPLASTIN TIME PARTIAL: CPT

## 2020-06-23 PROCEDURE — 99285 EMERGENCY DEPT VISIT HI MDM: CPT

## 2020-06-23 PROCEDURE — 84484 ASSAY OF TROPONIN QUANT: CPT

## 2020-06-23 PROCEDURE — 93005 ELECTROCARDIOGRAM TRACING: CPT | Performed by: EMERGENCY MEDICINE

## 2020-06-23 PROCEDURE — 2580000003 HC RX 258: Performed by: EMERGENCY MEDICINE

## 2020-06-23 PROCEDURE — 3430000000 HC RX DIAGNOSTIC RADIOPHARMACEUTICAL: Performed by: EMERGENCY MEDICINE

## 2020-06-23 PROCEDURE — 83615 LACTATE (LD) (LDH) ENZYME: CPT

## 2020-06-23 PROCEDURE — A9558 XE133 XENON 10MCI: HCPCS | Performed by: EMERGENCY MEDICINE

## 2020-06-23 PROCEDURE — 93010 ELECTROCARDIOGRAM REPORT: CPT | Performed by: INTERNAL MEDICINE

## 2020-06-23 PROCEDURE — 96374 THER/PROPH/DIAG INJ IV PUSH: CPT

## 2020-06-23 PROCEDURE — A9540 TC99M MAA: HCPCS | Performed by: EMERGENCY MEDICINE

## 2020-06-23 PROCEDURE — 6360000002 HC RX W HCPCS: Performed by: EMERGENCY MEDICINE

## 2020-06-23 PROCEDURE — 78582 LUNG VENTILAT&PERFUS IMAGING: CPT

## 2020-06-23 PROCEDURE — 85610 PROTHROMBIN TIME: CPT

## 2020-06-23 PROCEDURE — 82728 ASSAY OF FERRITIN: CPT

## 2020-06-23 PROCEDURE — 1200000000 HC SEMI PRIVATE

## 2020-06-23 PROCEDURE — 2580000003 HC RX 258: Performed by: INTERNAL MEDICINE

## 2020-06-23 PROCEDURE — 85025 COMPLETE CBC W/AUTO DIFF WBC: CPT

## 2020-06-23 RX ORDER — ACETAMINOPHEN 650 MG/1
650 SUPPOSITORY RECTAL EVERY 6 HOURS PRN
Status: DISCONTINUED | OUTPATIENT
Start: 2020-06-23 | End: 2020-06-25 | Stop reason: HOSPADM

## 2020-06-23 RX ORDER — PROMETHAZINE HYDROCHLORIDE 25 MG/1
12.5 TABLET ORAL EVERY 6 HOURS PRN
Status: DISCONTINUED | OUTPATIENT
Start: 2020-06-23 | End: 2020-06-25 | Stop reason: HOSPADM

## 2020-06-23 RX ORDER — PREDNISONE 20 MG/1
40 TABLET ORAL DAILY
Status: DISCONTINUED | OUTPATIENT
Start: 2020-06-23 | End: 2020-06-24

## 2020-06-23 RX ORDER — SODIUM CHLORIDE 0.9 % (FLUSH) 0.9 %
10 SYRINGE (ML) INJECTION EVERY 12 HOURS SCHEDULED
Status: DISCONTINUED | OUTPATIENT
Start: 2020-06-23 | End: 2020-06-25 | Stop reason: HOSPADM

## 2020-06-23 RX ORDER — IPRATROPIUM BROMIDE AND ALBUTEROL SULFATE 2.5; .5 MG/3ML; MG/3ML
3 SOLUTION RESPIRATORY (INHALATION) ONCE
Status: COMPLETED | OUTPATIENT
Start: 2020-06-23 | End: 2020-06-23

## 2020-06-23 RX ORDER — METHYLPREDNISOLONE SODIUM SUCCINATE 125 MG/2ML
125 INJECTION, POWDER, LYOPHILIZED, FOR SOLUTION INTRAMUSCULAR; INTRAVENOUS ONCE
Status: COMPLETED | OUTPATIENT
Start: 2020-06-23 | End: 2020-06-23

## 2020-06-23 RX ORDER — SODIUM CHLORIDE 0.9 % (FLUSH) 0.9 %
10 SYRINGE (ML) INJECTION PRN
Status: DISCONTINUED | OUTPATIENT
Start: 2020-06-23 | End: 2020-06-25 | Stop reason: HOSPADM

## 2020-06-23 RX ORDER — ALBUTEROL SULFATE 90 UG/1
2 AEROSOL, METERED RESPIRATORY (INHALATION) EVERY 4 HOURS PRN
Status: DISCONTINUED | OUTPATIENT
Start: 2020-06-23 | End: 2020-06-25 | Stop reason: HOSPADM

## 2020-06-23 RX ORDER — ONDANSETRON 2 MG/ML
4 INJECTION INTRAMUSCULAR; INTRAVENOUS EVERY 6 HOURS PRN
Status: DISCONTINUED | OUTPATIENT
Start: 2020-06-23 | End: 2020-06-25 | Stop reason: HOSPADM

## 2020-06-23 RX ORDER — ACETAMINOPHEN 325 MG/1
650 TABLET ORAL EVERY 6 HOURS PRN
Status: DISCONTINUED | OUTPATIENT
Start: 2020-06-23 | End: 2020-06-25 | Stop reason: HOSPADM

## 2020-06-23 RX ORDER — POLYETHYLENE GLYCOL 3350 17 G/17G
17 POWDER, FOR SOLUTION ORAL DAILY PRN
Status: DISCONTINUED | OUTPATIENT
Start: 2020-06-23 | End: 2020-06-25 | Stop reason: HOSPADM

## 2020-06-23 RX ORDER — HYDRALAZINE HYDROCHLORIDE 20 MG/ML
INJECTION INTRAMUSCULAR; INTRAVENOUS
COMMUNITY
Start: 2020-01-19 | End: 2020-06-23 | Stop reason: ALTCHOICE

## 2020-06-23 RX ORDER — XENON XE-133 10 MCI/1
15.2 GAS RESPIRATORY (INHALATION)
Status: COMPLETED | OUTPATIENT
Start: 2020-06-23 | End: 2020-06-23

## 2020-06-23 RX ADMIN — XENON XE-133 15.2 MILLICURIE: 10 GAS RESPIRATORY (INHALATION) at 11:11

## 2020-06-23 RX ADMIN — PREDNISONE 40 MG: 20 TABLET ORAL at 17:44

## 2020-06-23 RX ADMIN — Medication 10 ML: at 20:27

## 2020-06-23 RX ADMIN — Medication 10 ML: at 11:12

## 2020-06-23 RX ADMIN — Medication 2 PUFF: at 20:22

## 2020-06-23 RX ADMIN — ENOXAPARIN SODIUM 30 MG: 30 INJECTION SUBCUTANEOUS at 20:26

## 2020-06-23 RX ADMIN — IPRATROPIUM BROMIDE AND ALBUTEROL SULFATE 3 AMPULE: .5; 3 SOLUTION RESPIRATORY (INHALATION) at 08:47

## 2020-06-23 RX ADMIN — METHYLPREDNISOLONE SODIUM SUCCINATE 125 MG: 125 INJECTION, POWDER, FOR SOLUTION INTRAMUSCULAR; INTRAVENOUS at 08:40

## 2020-06-23 RX ADMIN — Medication 4.72 MILLICURIE: at 11:12

## 2020-06-23 ASSESSMENT — PAIN SCALES - GENERAL
PAINLEVEL_OUTOF10: 0

## 2020-06-23 NOTE — H&P
4-7-10    fulgeration of bladder tumor    CYSTOSCOPY  7/14/10    Cytology    CYSTOSCOPY  12/15/10    CYSTOSCOPY  3/2/11    COLLAGEN INJECTION    CYSTOSCOPY  6-    cytology    CYSTOSCOPY  10/19/11    CYSTOSCOPY  4/11/12    CYSTOSCOPY  10/8/12    cytology    CYSTOSCOPY  4/7/14    cysto/cytology    CYSTOSCOPY  04/29/2015    Cytology study    CYSTOSCOPY Right 1/16/2020    CYSTOSCOPY RIGHT URETERAL STENT REMOVAL, RIGHT RETROGRADE PYELOGRAM performed by Chirag Lebron MD at [address]      cataract left eye    FEMUR FRACTURE SURGERY Left [id number]    GASTRIC BYPASS SURGERY      JOINT REPLACEMENT      left knee    OTHER SURGICAL HISTORY Left 10/24/2016    left nephroureterectomy    OTHER SURGICAL HISTORY  03/22/2017    RIGHT ureteral stent placement, RIGHT nephrostomy drain placement     TOTAL KNEE ARTHROPLASTY Left        Medications Prior to Admission:      Prior to Admission medications    Medication Sig Start Date End Date Taking? Authorizing Provider   hydrALAZINE (APRESOLINE) 20 MG/ML injection Take by mouth 1/19/20  Yes Historical Provider, MD   ipratropium-albuterol (DUONEB) 0.5-2.5 (3) MG/3ML SOLN nebulizer solution Inhale 3 mLs into the lungs every 4 hours as needed for Shortness of Breath 5/6/20  Yes Reggie Castillo MD   mometasone-formoterol (DULERA) 200-5 MCG/ACT inhaler Inhale 2 puffs into the lungs every 12 hours Note to pharmacist... May substitute for comparable drug if this is not on formulary. .. Call if questions  [phone] 3/5/20  Yes Guy Hong PA-C   albuterol sulfate  (90 Base) MCG/ACT inhaler Use 2 puffs 4 times daily for 7 days then as needed for wheezing. Dispense with Spacer and instruct in use. At patient's preference may use 60 dose MDI. May Sub Pro-Air or Proventil as needed per insurance.  3/5/20  Yes Guy Hong PA-C   venlafaxine (EFFEXOR) 75 MG tablet Take 1 tablet by mouth 2 times daily 1/19/20  Yes Zachary Buck MD equal, round, and reactive to light. Extra ocular muscles intact. Conjunctivae/corneas clear. Neck: Supple, with full range of motion. No jugular venous distention. Trachea midline. Respiratory: Bilateral rhonchi, posterior scattered expiratory wheezes. Cardiovascular:  Regular rate and rhythm with normal S1/S2 without murmurs, rubs or gallops. Abdomen: Soft, non-tender, non-distended with normal bowel sounds. Musculoskeletal:  No clubbing, cyanosis or edema bilaterally. Full range of motion without deformity. Skin: Skin color, texture, turgor normal.  No rashes or lesions. Neurologic:  Neurovascularly intact without any focal sensory/motor deficits. Cranial nerves: II-XII intact, grossly non-focal.  Psychiatric:  Alert and oriented, thought content appropriate, normal insight  Capillary Refill: Brisk,< 3 seconds   Peripheral Pulses: +2 palpable, equal bilaterally       Labs:     Recent Labs     06/23/20  0810   WBC 5.5   HGB 10.5*   HCT 33.5*        Recent Labs     06/23/20  0810      K 4.8      CO2 23   BUN 33*   CREATININE 1.9*   CALCIUM 8.4     Recent Labs     06/23/20  0810   AST 20   ALT 7*   BILITOT <0.2   ALKPHOS 136*     No results for input(s): INR in the last 72 hours. Recent Labs     06/23/20  0810 06/23/20  1134   TROPONINI 0.02* 0.06*       Urinalysis:      Lab Results   Component Value Date    NITRU Negative 01/14/2020    WBCUA 15 01/14/2020    BACTERIA none 08/12/2016    RBCUA 5-10 01/14/2020    BLOODU MODERATE 01/14/2020    SPECGRAV 1.016 01/14/2020    GLUCOSEU Negative 01/14/2020       Radiology:     CXR: I have reviewed the CXR with the following interpretation: No acute changes. VQ scan shows low probability for pulmonary embolism. EKG:  I have reviewed the EKG with the following interpretation: Normal sinus rhythm. Low quality EKG. NM LUNG VENT/PERFUSION (VQ)   Final Result   Low probability for pulmonary embolism. Obstructive lung disease.          XR

## 2020-06-23 NOTE — ED PROVIDER NOTES
sinus rhythmLateral infarct , age undeterminedAbnormal ECG     The 12 lead EKG was interpreted by me as follows:  Rate: normal with a rate of 67  Rhythm: sinus  Axis: normal  Intervals: normal UT, narrow QRS, normal QTc  ST segments: no ST elevations or depressions  T waves: inverted I/aVL but not precordial leads  Non-specific T wave changes: present  Prior EKG comparison: EKG dated 6/14/20 is not significantly different    RADIOLOGY    Nm Lung Vent/perfusion (vq)    Result Date: 6/23/2020  EXAMINATION: NUCLEAR MEDICINE VENTILATION PERFUSION SCAN. 6/23/2020 TECHNIQUE: 66.1 millicuries xenon 510 was administered via mask prior to planar imaging of the lungs in multiple projections. Then, 9.428 millicuries of Tc 88B MAA was administered intravenously prior to planar imaging of the lungs in similar projections. COMPARISON: Chest radiograph 06/23/2020. HISTORY: ORDERING SYSTEM PROVIDED HISTORY: +ddimer, SOB TECHNOLOGIST PROVIDED HISTORY: Reason for exam:->+ddimer, SOB Reason for Exam: Positive D-dimer Acuity: Unknown Type of Exam: Ongoing FINDINGS: VENTILATION: There is distribution of radiopharmaceutical bilaterally. Diminished activity is seen within the left mid lung, left base, right mid lung, right base. Moderate bilateral xenon retention on washout imaging. PERFUSION: Heterogeneity of perfusion is demonstrated. Diminished activity within the mid to lower lungs bilaterally is seen which appears relatively matched to the ventilation pattern. Low probability for pulmonary embolism. Obstructive lung disease. Xr Chest Portable    Result Date: 6/23/2020  EXAMINATION: ONE XRAY VIEW OF THE CHEST 6/23/2020 9:03 am COMPARISON: 06/04/2020 HISTORY: ORDERING SYSTEM PROVIDED HISTORY: sob copd TECHNOLOGIST PROVIDED HISTORY: Reason for exam:->sob copd Reason for Exam: sob copd Acuity: Acute Type of Exam: Initial FINDINGS: Single portable frontal view of the chest is submitted for review.   The cardiac silhouette is normal in size. Lungs are hyperinflated, suggestive of underlying COPD. Lung parenchyma without focal airspace consolidation, sizeable pleural effusion, or pneumothorax. Trachea is midline. Visualized osseous structures and soft tissues are grossly intact. No acute cardiopulmonary pathology. Xr Chest Portable    Result Date: 6/14/2020  EXAMINATION: ONE XRAY VIEW OF THE CHEST 6/14/2020 5:20 am COMPARISON: Chest radiographs 05/07/2020 HISTORY: ORDERING SYSTEM PROVIDED HISTORY: Eval for infiltrate TECHNOLOGIST PROVIDED HISTORY: Reason for exam:->Eval for infiltrate FINDINGS: Hyperinflated lungs. Stable cardiomediastinal contours. No pneumothorax, pleural effusion or focal airspace consolidation. No acute osseous abnormality. No focal pneumonia. Hyperinflated lungs suggests obstructive small airways disease such as COPD. ED COURSE/MDM  Patient seen and evaluated. Old records reviewed. Labs and imaging reviewed and results discussed with patient. After initial evaluation, differential diagnostic considerations included: acute coronary syndrome, pulmonary embolism, COPD/asthma, pneumonia, sepsis, pericardial tamponade, pneumothorax, CHF, thoracic aortic dissection, anxiety    The patient's ED workup was notable for acute COPD exacerbation. No signs of pneumonia. No signs of COVID.  +ddimer but V/Q negative, cannot do CTPA due to CKD. Creatinine stable. Initial trop 0.02 but repeat higher at 0.06 despite history a little atypical for ACS. Will admit, nebs/steroids, allergic to aspirin so this was avoided.     During the patient's ED course, the patient was given:  Medications   sodium chloride flush 0.9 % injection 10 mL (10 mLs Intravenous Given 6/23/20 1112)   methylPREDNISolone sodium (SOLU-MEDROL) injection 125 mg (125 mg Intravenous Given 6/23/20 0840)   ipratropium-albuterol (DUONEB) nebulizer solution 3 ampule (3 ampules Inhalation Given 6/23/20 0847)   technetium albumin aggregated (MAA) solution 8.85 millicurie (8.49 millicuries Intravenous Given 6/23/20 1112)   xenon xe 140 gas 30.8 millicurie (67.0 millicuries Inhalation Given 6/23/20 1111)        CLINICAL IMPRESSION  1. COPD with acute exacerbation ([address]    2. Dyspnea and respiratory abnormalities    3. Elevated troponin        Blood pressure (!) 132/48, pulse 74, temperature 97.9 °F (36.6 °C), temperature source Oral, resp. rate 19, height 5' 6\" (1.676 m), weight 138 lb (62.6 kg), SpO2 96 %. DISPOSITION  Geovanna Clark was admitted in fair condition. The plan is to admit to the hospital at this time under the hospitalist service. Dr. Bess Hodgson accepted the patient and will take over the patient's care. The total critical care time spent while evaluating and treating this patient was at least 31 minutes. This excludes time spent doing separately billable procedures. This includes time at the bedside, data interpretation, medication management, obtaining critical history from collateral sources if the patient is unable to provide it directly, and physician consultation. Specifics of interventions taken and potentially life-threatening diagnostic considerations are listed above in the medical decision making. DISCLAIMER: This chart was created using Dragon dictation software. Efforts were made by me to ensure accuracy, however some errors may be present due to limitations of this technology and occasionally words are not transcribed correctly.         Tanya Liang MD  06/23/20 [id number]

## 2020-06-23 NOTE — ED NOTES
Pharmacy Medication History Note      List of current medications patient is taking is complete. Source of information: 2C2P    Changes made to medication list:  Medications flagged for removal (include reason, ex. noncompliance):  N/A    Medications removed (include reason, ex. therapy complete or physician discontinued):  Hydralazine- discontinued  Metoprolol- therapy complete  Lisinopril- therapy complete  Dulera- therapy complete    Medications added/doses adjusted:  N/A    Other notes (ex. Recent course of antibiotics, Coumadin dosing):  Denies use of other OTC or herbal medications. Last dose times updated. [facility]    No current facility-administered medications on file prior to encounter. Current Outpatient Medications on File Prior to Encounter   Medication Sig Dispense Refill    ipratropium-albuterol (DUONEB) 0.5-2.5 (3) MG/3ML SOLN nebulizer solution Inhale 3 mLs into the lungs every 4 hours as needed for Shortness of Breath 360 mL 0    albuterol sulfate  (90 Base) MCG/ACT inhaler Use 2 puffs 4 times daily for 7 days then as needed for wheezing. Dispense with Spacer and instruct in use. At patient's preference may use 60 dose MDI. May Sub Pro-Air or Proventil as needed per insurance. 1 Inhaler 0    Cyanocobalamin (VITAMIN B-12) 1000 MCG extended release tablet Take 1 tablet by mouth daily 90 tablet 3    acetaminophen (TYLENOL) 500 MG tablet Take 500 mg by mouth every 6 hours as needed for Pain. [DISCONTINUED] LISINOPRIL PO Take 10 mg by mouth daily      [DISCONTINUED] hydrALAZINE (APRESOLINE) 20 MG/ML injection Take by mouth      [DISCONTINUED] metoprolol tartrate (LOPRESSOR) 25 MG tablet Take 25 mg by mouth daily      [DISCONTINUED] mometasone-formoterol (DULERA) 200-5 MCG/ACT inhaler Inhale 2 puffs into the lungs every 12 hours Note to pharmacist... May substitute for comparable drug if this is not on formulary. ..  Call if questions  [phone] 1 Inhaler 0 [DISCONTINUED] hydrALAZINE (APRESOLINE) 25 MG tablet Take 1 tablet by mouth 3 times daily 90 tablet 0    [DISCONTINUED] venlafaxine (EFFEXOR) 75 MG tablet Take 1 tablet by mouth 2 times daily 60 tablet 0    [DISCONTINUED] metoprolol succinate (TOPROL XL) 25 MG extended release tablet TAKE ONE TABLET BY MOUTH ONCE DAILY 90 tablet 0    [DISCONTINUED] nicotine (NICODERM CQ) 14 MG/24HR Place 1 patch onto the skin daily 30 patch 3

## 2020-06-23 NOTE — ED NOTES
Pt walked in room Oxygen 97 % and 85 HR. Pt says she was dizzy. Pt tolerated well.      Antelmo Choudhury, CORNELIUS  06/23/20 [id number]

## 2020-06-23 NOTE — CARE COORDINATION
Discharge Planning Assessment    SW discharge planner met with patient to discuss reason for admission, current living situation, and potential needs at the time of discharge. Pt in ED d/t COPD    Demographics/Insurance verified:  Yes    Current type of dwelling:  Mobile home    Living arrangements:  Alone    Level of function/Support:  Pt reported she is independent w/ADLs. Stated she [place]. Has 2 supportive children in the area. PCP:  None. Pt stated she has contacted the [address] [place] [address] and will be setting up an appointment with them soon. Last Visit to PCP:  Over 2 years ago    DME:  Bobby Amble, cane, nebulizer, shower chair    Active with any community  resources/agencies/skilled home care:  Yes, active with [facility] for meals, cleaning aide and pest control services. Pt's care manager for program is Charmaine Forrester. Called and left message w/her to inform of admission. Last admission note stated pt was active w/[facility]. Sent email to Sue Ramirez, supervisor for APS, to see if pt was still active. Medication compliance issues:  No    Financial issues that could impact healthcare:  No    Tentative discharge plan:  Home most likely. Active with [facility]. RN reported bed bugs found on pt. Pt reports she is active w/pest control services through [facility]. No other needs identified at this time. Discussed with patient and/or family that on the day of discharge home tentative time of discharge will be between 10 AM and noon. Transportation at the time of discharge:  Possibly one of her kids could pick her up but they work. Pt may need a Lyft.     Electronically signed by RIGOBERTO Ruiz, REGINA on 6/23/2020 at 2:33 PM

## 2020-06-24 LAB
A/G RATIO: 0.9 (ref 1.1–2.2)
ALBUMIN SERPL-MCNC: 3.2 G/DL (ref 3.4–5)
ALP BLD-CCNC: 126 U/L (ref 40–129)
ALT SERPL-CCNC: 10 U/L (ref 10–40)
ANION GAP SERPL CALCULATED.3IONS-SCNC: 10 MMOL/L (ref 3–16)
APTT: 27.4 SEC (ref 24.2–36.2)
AST SERPL-CCNC: 17 U/L (ref 15–37)
BASOPHILS ABSOLUTE: 0 K/UL (ref 0–0.2)
BASOPHILS RELATIVE PERCENT: 0.2 %
BILIRUB SERPL-MCNC: <0.2 MG/DL (ref 0–1)
BUN BLDV-MCNC: 41 MG/DL (ref 7–20)
CALCIUM SERPL-MCNC: 8.9 MG/DL (ref 8.3–10.6)
CHLORIDE BLD-SCNC: 107 MMOL/L (ref 99–110)
CO2: 20 MMOL/L (ref 21–32)
CREAT SERPL-MCNC: 1.7 MG/DL (ref 0.6–1.2)
D DIMER: 307 NG/ML DDU (ref 0–229)
EOSINOPHILS ABSOLUTE: 0 K/UL (ref 0–0.6)
EOSINOPHILS RELATIVE PERCENT: 0 %
FERRITIN: 12.1 NG/ML (ref 15–150)
FIBRINOGEN: 341 MG/DL (ref 200–397)
GFR AFRICAN AMERICAN: 35
GFR NON-AFRICAN AMERICAN: 29
GLOBULIN: 3.4 G/DL
GLUCOSE BLD-MCNC: 149 MG/DL (ref 70–99)
HCT VFR BLD CALC: 31.5 % (ref 36–48)
HEMOGLOBIN: 10.3 G/DL (ref 12–16)
INR BLD: 0.99 (ref 0.86–1.14)
LYMPHOCYTES ABSOLUTE: 0.7 K/UL (ref 1–5.1)
LYMPHOCYTES RELATIVE PERCENT: 6.8 %
MCH RBC QN AUTO: 29.9 PG (ref 26–34)
MCHC RBC AUTO-ENTMCNC: 32.6 G/DL (ref 31–36)
MCV RBC AUTO: 91.7 FL (ref 80–100)
MONOCYTES ABSOLUTE: 0.3 K/UL (ref 0–1.3)
MONOCYTES RELATIVE PERCENT: 2.9 %
NEUTROPHILS ABSOLUTE: 9 K/UL (ref 1.7–7.7)
NEUTROPHILS RELATIVE PERCENT: 90.1 %
PDW BLD-RTO: 14.3 % (ref 12.4–15.4)
PLATELET # BLD: 226 K/UL (ref 135–450)
PMV BLD AUTO: 9.9 FL (ref 5–10.5)
POTASSIUM REFLEX MAGNESIUM: 4.8 MMOL/L (ref 3.5–5.1)
PROTHROMBIN TIME: 11.5 SEC (ref 10–13.2)
RBC # BLD: 3.44 M/UL (ref 4–5.2)
SARS-COV-2, PCR: NOT DETECTED
SODIUM BLD-SCNC: 137 MMOL/L (ref 136–145)
TOTAL PROTEIN: 6.6 G/DL (ref 6.4–8.2)
WBC # BLD: 9.9 K/UL (ref 4–11)

## 2020-06-24 PROCEDURE — 85730 THROMBOPLASTIN TIME PARTIAL: CPT

## 2020-06-24 PROCEDURE — 80053 COMPREHEN METABOLIC PANEL: CPT

## 2020-06-24 PROCEDURE — 6370000000 HC RX 637 (ALT 250 FOR IP): Performed by: INTERNAL MEDICINE

## 2020-06-24 PROCEDURE — 36415 COLL VENOUS BLD VENIPUNCTURE: CPT

## 2020-06-24 PROCEDURE — 99223 1ST HOSP IP/OBS HIGH 75: CPT | Performed by: INTERNAL MEDICINE

## 2020-06-24 PROCEDURE — 6360000002 HC RX W HCPCS: Performed by: INTERNAL MEDICINE

## 2020-06-24 PROCEDURE — 94640 AIRWAY INHALATION TREATMENT: CPT

## 2020-06-24 PROCEDURE — 94761 N-INVAS EAR/PLS OXIMETRY MLT: CPT

## 2020-06-24 PROCEDURE — 85384 FIBRINOGEN ACTIVITY: CPT

## 2020-06-24 PROCEDURE — 85379 FIBRIN DEGRADATION QUANT: CPT

## 2020-06-24 PROCEDURE — 2580000003 HC RX 258: Performed by: INTERNAL MEDICINE

## 2020-06-24 PROCEDURE — 94760 N-INVAS EAR/PLS OXIMETRY 1: CPT

## 2020-06-24 PROCEDURE — 85025 COMPLETE CBC W/AUTO DIFF WBC: CPT

## 2020-06-24 PROCEDURE — 85610 PROTHROMBIN TIME: CPT

## 2020-06-24 PROCEDURE — 99222 1ST HOSP IP/OBS MODERATE 55: CPT | Performed by: INTERNAL MEDICINE

## 2020-06-24 PROCEDURE — 1200000000 HC SEMI PRIVATE

## 2020-06-24 RX ORDER — ALBUTEROL SULFATE 90 UG/1
2 AEROSOL, METERED RESPIRATORY (INHALATION) 4 TIMES DAILY
Status: DISCONTINUED | OUTPATIENT
Start: 2020-06-24 | End: 2020-06-25 | Stop reason: HOSPADM

## 2020-06-24 RX ADMIN — Medication 2 PUFF: at 20:32

## 2020-06-24 RX ADMIN — ENOXAPARIN SODIUM 30 MG: 30 INJECTION SUBCUTANEOUS at 19:46

## 2020-06-24 RX ADMIN — Medication 10 ML: at 16:01

## 2020-06-24 RX ADMIN — GLYCOPYRROLATE AND FORMOTEROL FUMARATE 2 PUFF: 9; 4.8 AEROSOL, METERED RESPIRATORY (INHALATION) at 20:32

## 2020-06-24 RX ADMIN — ACETAMINOPHEN 650 MG: 325 TABLET, FILM COATED ORAL at 18:12

## 2020-06-24 RX ADMIN — Medication 2 PUFF: at 15:37

## 2020-06-24 RX ADMIN — Medication 10 ML: at 08:32

## 2020-06-24 RX ADMIN — ACETAMINOPHEN 650 MG: 325 TABLET, FILM COATED ORAL at 01:15

## 2020-06-24 RX ADMIN — PREDNISONE 40 MG: 20 TABLET ORAL at 08:32

## 2020-06-24 ASSESSMENT — PAIN DESCRIPTION - ONSET: ONSET: GRADUAL

## 2020-06-24 ASSESSMENT — PAIN DESCRIPTION - FREQUENCY: FREQUENCY: INTERMITTENT

## 2020-06-24 ASSESSMENT — PAIN DESCRIPTION - PAIN TYPE
TYPE: ACUTE PAIN
TYPE: ACUTE PAIN

## 2020-06-24 ASSESSMENT — PAIN DESCRIPTION - ORIENTATION: ORIENTATION: RIGHT;LEFT

## 2020-06-24 ASSESSMENT — PAIN SCALES - GENERAL
PAINLEVEL_OUTOF10: 2
PAINLEVEL_OUTOF10: 0
PAINLEVEL_OUTOF10: 8
PAINLEVEL_OUTOF10: 0

## 2020-06-24 ASSESSMENT — PAIN DESCRIPTION - LOCATION
LOCATION: HEAD
LOCATION: HEAD

## 2020-06-24 ASSESSMENT — PAIN DESCRIPTION - PROGRESSION: CLINICAL_PROGRESSION: GRADUALLY WORSENING

## 2020-06-24 ASSESSMENT — PAIN DESCRIPTION - DESCRIPTORS: DESCRIPTORS: SHARP

## 2020-06-24 NOTE — PROGRESS NOTES
Per Armani Rojas in stress lab, patient will be unable to have stress test until COVID test has resulted. MD notified.

## 2020-06-24 NOTE — CONSULTS
P Pulmonary and Critical Care   Consult Note      Reason for Consult: COPD exacerbation  Requesting Physician: Dr Nikki Eli:   [facility]:                The patient is a 66 y.o. female with significant past medical history of:      Diagnosis Date    Arthritis     Asthma     B[facility] 1 disorder [facility]     Cancer ([facility]     bladder    COPD (chronic obstructive pulmonary disease) ([facility]     Depression     controlled w/meds    Diabetes mellitus ([facility]     diet control    Hypertension     Neuropathy     toes    Obesity, unspecified     Osteoarthrosis, unspecified whether generalized or localized, lower leg     PVD (peripheral vascular disease) ([facility]     Tobacco use disorder     Unspecified cataract      The patient presents to the emergency department with complaints of severe dyspnea of about 1 days duration. She also complained of associated wheezing and cough. COPD is likely a modifying factor. She has smoked most of her adult life. She did state that she quit about 2 months ago. She has been diagnosed with COPD in the past though she has never had pulmonary function testing. The patient has multiple emergency room presentations with a diagnosis of COPD exacerbation going back at least 2 years. In the ED VQ scan was performed and felt to be low probability for pulmonary embolism. This morning, the patient feels her breathing has improved. She is sitting up in the chair and is in no acute Respiratory distress.       Past Surgical History:        Procedure Laterality Date    ABDOMEN SURGERY      BLADDER REMOVAL      partial    CHOLECYSTECTOMY      COLONOSCOPY      CYSTOSCOPY  4-7-10    fulgeration of bladder tumor    CYSTOSCOPY  7/14/10    Cytology    CYSTOSCOPY  12/15/10    CYSTOSCOPY  3/2/11    COLLAGEN INJECTION    CYSTOSCOPY  6-    cytology    CYSTOSCOPY  10/19/11    CYSTOSCOPY  4/11/12    CYSTOSCOPY  10/8/12    cytology    CYSTOSCOPY  4/7/14 cysto/cytology    CYSTOSCOPY  04/29/2015    Cytology study    CYSTOSCOPY Right 1/16/2020    CYSTOSCOPY RIGHT URETERAL STENT REMOVAL, RIGHT RETROGRADE PYELOGRAM performed by Marge Cortez MD at [address]      cataract left eye    FEMUR FRACTURE SURGERY Left [id number]    GASTRIC BYPASS SURGERY      JOINT REPLACEMENT      left knee    OTHER SURGICAL HISTORY Left 10/24/2016    left nephroureterectomy    OTHER SURGICAL HISTORY  03/22/2017    RIGHT ureteral stent placement, RIGHT nephrostomy drain placement     TOTAL KNEE ARTHROPLASTY Left      Current Medications:    Current Facility-Administered Medications: sodium chloride flush 0.9 % injection 10 mL, 10 mL, Intravenous, PRN  albuterol sulfate  (90 Base) MCG/ACT inhaler 2 puff, 2 puff, Inhalation, Q4H PRN  sodium chloride flush 0.9 % injection 10 mL, 10 mL, Intravenous, 2 times per day  sodium chloride flush 0.9 % injection 10 mL, 10 mL, Intravenous, PRN  acetaminophen (TYLENOL) tablet 650 mg, 650 mg, Oral, Q6H PRN **OR** acetaminophen (TYLENOL) suppository 650 mg, 650 mg, Rectal, Q6H PRN  polyethylene glycol (GLYCOLAX) packet 17 g, 17 g, Oral, Daily PRN  promethazine (PHENERGAN) tablet 12.5 mg, 12.5 mg, Oral, Q6H PRN **OR** ondansetron (ZOFRAN) injection 4 mg, 4 mg, Intravenous, Q6H PRN  enoxaparin (LOVENOX) injection 30 mg, 30 mg, Subcutaneous, Nightly  predniSONE (DELTASONE) tablet 40 mg, 40 mg, Oral, Daily  regadenoson (LEXISCAN) injection 0.4 mg, 0.4 mg, Intravenous, ONCE PRN    Allergies   Allergen Reactions    Aspirin Shortness Of Breath    Ibuprofen Shortness Of Breath    Adhesive Tape Swelling     Only if it stays on a long time    Hydrochlorothiazide Other (See Comments)     Palpitations.  Microderm Base [Albolene]     Nsaids Other (See Comments)     Respiratory failure.  Voltaren [Diclofenac Sodium]      Wheezing, swelling.      Procardia [Nifedipine] Swelling and Rash       Social History:    TOBACCO: 24HR INTAKE/OUTPUT:      Intake/Output Summary (Last 24 hours) at 6/24/2020 1157  Last data filed at 6/24/2020 1046  Gross per 24 hour   Intake 240 ml   Output 800 ml   Net -560 ml     CONSTITUTIONAL:  awake, alert, cooperative, no apparent distress, and appears stated age  NECK:  Supple, symmetrical, trachea midline, no adenopathy, thyroid symmetric, not enlarged and no tenderness, skin normal  LUNGS:  no increased work of breathing and diminished to auscultation. No accessory muscle use  CARDIOVASCULAR: S1 and S2, no edema and no JVD  ABDOMEN:  normal bowel sounds, non-distended and no masses palpated, and no tenderness to palpation. No hepatospleenomegaly  LYMPHADENOPATHY:  no axillary or supraclavicular adenopathy. No cervical adnenopathy  PSYCHIATRIC: Oriented to person place and time. No obvious depression or anxiety. MUSCULOSKELETAL: No obvious misalignment or effusion of the joints. No clubbing, cyanosis of the digits. SKIN:  normal skin color, texture, turgor and no redness, warmth, or swelling. No palpable nodules    DATA:    Old records have been reviewed    CBC:  Recent Labs     06/23/20  0810 06/23/20  1522 06/24/20  0419   WBC 5.5 4.8 9.9   RBC 3.62* 3.54* 3.44*   HGB 10.5* 10.6* 10.3*   HCT 33.5* 32.9* 31.5*    231 226   MCV 92.4 92.8 91.7   MCH 29.1 29.9 29.9   MCHC 31.5 32.3 32.6   RDW 14.3 14.3 14.3      BMP:  Recent Labs     06/23/20  0810 06/23/20  1522 06/24/20  0419    135* 137   K 4.8 4.7 4.8    105 107   CO2 23 18* 20*   BUN 33* 36* 41*   CREATININE 1.9* 2.1* 1.7*   CALCIUM 8.4 8.5 8.9   GLUCOSE 101* 289* 149*      ABG:  No results for input(s): PHART, CTX2AVK, PO2ART, WXX0HSO, R1BWEFTP, BEART in the last 72 hours.     Lab Results   Component Value Date    BNP 37 02/09/2010     Lab Results   Component Value Date    CKTOTAL 332 (H) 02/09/2010    TROPONINI 0.03 (H) 06/23/2020       Cultures:     Abx:    Radiology Review:  All pertinent images / reports were reviewed as a

## 2020-06-24 NOTE — CONSULTS
MD Jay        Or    ondansetron (ZOFRAN) injection 4 mg  4 mg Intravenous Q6H PRN Omayra Roman MD        enoxaparin (LOVENOX) injection 30 mg  30 mg Subcutaneous Nightly Omayra Roman MD   30 mg at 06/23/20 2026    predniSONE (DELTASONE) tablet 40 mg  40 mg Oral Daily Omayra Roman MD   40 mg at 06/24/20 6688    regadenoson (LEXISCAN) injection 0.4 mg  0.4 mg Intravenous ONCE PRN John Hammonds MD            Allergies:  Aspirin; Ibuprofen; Adhesive tape; Hydrochlorothiazide; Microderm base [albolene]; Nsaids; Voltaren [diclofenac sodium]; and Procardia [nifedipine]     Review of Systems:     · Constitutional: there has been no unanticipated weight loss. There's been no change in energy level, sleep pattern, or activity level. · Eyes: No visual changes or diplopia. No scleral icterus. · ENT: No Headaches, hearing loss or vertigo. No mouth sores or sore throat. · Cardiovascular: Reviewed in HPI  · Respiratory: No cough or wheezing, no sputum production. No hematemesis. · Gastrointestinal: No abdominal pain, appetite loss, blood in stools. No change in bowel or bladder habits. · Genitourinary: No dysuria, trouble voiding, or hematuria. · Musculoskeletal:  No gait disturbance, weakness or joint complaints. · Integumentary: No rash or pruritis. · Neurological: No headache, diplopia, change in muscle strength, numbness or tingling. No change in gait, balance, coordination, mood, affect, memory, mentation, behavior. · Psychiatric: No anxiety, no depression. · Endocrine: No malaise, fatigue or temperature intolerance. No excessive thirst, fluid intake, or urination. No tremor. · Hematologic/Lymphatic: No abnormal bruising or bleeding, blood clots or swollen lymph nodes. · Allergic/Immunologic: No nasal congestion or hives.   ·     Physical Examination:    Vitals:    06/24/20 0830   BP: (!) 118/48   Pulse: 58   Resp: 16   Temp: 97.8 °F (36.6 °C)   SpO2: 95%    Weight: 138 lb (62.6 kg) General Appearance:  Alert, cooperative, no distress, appears stated age   Head:  Normocephalic, without obvious abnormality, atraumatic   Eyes:  PERRL, conjunctiva/corneas clear       Nose: Nares normal, no drainage or sinus tenderness   Throat: Lips, mucosa, and tongue normal   Neck: Supple, symmetrical, trachea midline, no adenopathy, thyroid: not enlarged, symmetric, no tenderness/mass/nodules, no carotid bruit or JVD       Lungs:   Wheeze to auscultation bilaterally, respirations unlabored   Chest Wall:  No tenderness or deformity   Heart:  Regular rate and rhythm, S1, S2 normal, no murmur, rub or gallop   Abdomen:   Soft, non-tender, bowel sounds active all four quadrants,  no masses, no organomegaly           Extremities: Extremities normal, atraumatic, no cyanosis or edema   Pulses: 2+ and symmetric   Skin: Skin color, texture, turgor normal, no rashes or lesions   Pysch: Normal mood and affect   Neurologic: Normal gross motor and sensory exam.         Labs  CBC:   Lab Results   Component Value Date    WBC 9.9 06/24/2020    RBC 3.44 06/24/2020    HGB 10.3 06/24/2020    HCT 31.5 06/24/2020    MCV 91.7 06/24/2020    RDW 14.3 06/24/2020     06/24/2020     CMP:    Lab Results   Component Value Date     06/24/2020    K 4.8 06/24/2020     06/24/2020    CO2 20 06/24/2020    BUN 41 06/24/2020    CREATININE 1.7 06/24/2020    GFRAA 35 06/24/2020    GFRAA 57 10/08/2012    AGRATIO 0.9 06/24/2020    LABGLOM 29 06/24/2020    GLUCOSE 149 06/24/2020    PROT 6.6 06/24/2020    PROT 7.5 02/10/2010    CALCIUM 8.9 06/24/2020    BILITOT <0.2 06/24/2020    ALKPHOS 126 06/24/2020    AST 17 06/24/2020    ALT 10 06/24/2020     PT/INR:  No results found for: PTINR  Lab Results   Component Value Date    CKTOTAL 332 (H) 02/09/2010    TROPONINI 0.03 (H) 06/23/2020       EKG:  I have reviewed EKG with the following interpretation:  Impression:  * Suspect arm lead reversal, interpretation assumes no reversalNormal sinus rhythmLateral infarct , age undeterminedAbnormal ECGConfirmed     Pt has CAD with following history:  CABG: (date/details),   Valve replacement (date/details)   GXT/Myoview/Lexiscan: (2016)  (results)   Summary    Small sized anteroapical fixed defect consistent with breast attenuation    artifact . No ischemia. Normal LV FXN. Stress/echo: (date) (result)   CATH/PCI:  (date)- (results)  - (# and type of stents) -   ECHO: (date) results EF    %. HAYLEY (date) (results)  EP procedures/studies/ablation:  (specific data). Device information:  Event monitor: (date/results)    All testing and labs listed below were personally reviewed. Assessment  Patient Active Problem List   Diagnosis    Asthma    [facility] I disorder, single manic episode [facility]    Chronic airway obstruction, not elsewhere classified    Recurrent major depressive disorder, in remission ([facility]    Diabetes mellitus type 2 in nonobese ([facility]    Tobacco use disorder    Essential hypertension    Osteoarthrosis involving lower leg    History of primary bladder cancer ( Dr Olman Anton)   [address] [place] [address] Venous insufficiency    Dermatosclerosis ([facility]    Impaired glucose tolerance    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 Assessment/Plan:  Active Problems:    COPD exacerbation (HCC)  Plan: Per Primary Team      Type 2 MI: elevated troponin and normal ECG without CP. NO PE per VQ scan. Elevated creatinine could cause elevated troponin. Probably demand ischemia, type 2 MI. Cont aspirin, statin. Recommend ischemic evaluation with nuc stress test.    I will address the patient's cardiac risk factors and adjusted pharmacologic treatment as needed. In addition, I have reinforced the need for patient directed risk factor modification.     Tobacco use was discussed with the patient and educated on the negative effects. I have asked the patient to not utilize these agents. Thank you for allowing to us to participate in the care or [facility] [place]. Further evaluation will be based upon the patient's clinical course and testing results. All questions and concerns were addressed to the patient/family. Alternatives to my treatment were discussed. The note was completed using EMR. Every effort was made to ensure accuracy; however, inadvertent computerized transcription errors may be present.     Hayley Clark MD 6/24/2020 8:39 AM

## 2020-06-24 NOTE — PROGRESS NOTES
and/or coordinating care with nurse. Problem List  Active Problems:    COPD exacerbation ([address]  Resolved Problems:    * No resolved hospital problems. *     Assessment & Plan:     1. COPD exacerbation:  Patient's respiratory status has improved since admission. Currently off of oxygen. Pulmonology on board. Recommends BEVESPI and discontinuing steroids. COVID-19 test ordered and result pending. On albuterol inhaler as needed and BEVESPI. Ordered PT/OT and evaluation for home oxygen since patient complains of chronic dyspnea on minimal exertion. 2.  Elevated troponins/NSTEMI:  Highest troponin level of 0.06 noted on labs. No major findings on EKG. Cardiology on board. Planning cardiac stress test if COVID-19 negative. 3.  Hypertension:  Blood pressures in normal range. Not on any meds. 4.  SANAM on CKD:  Serum creatinine down from 2.1 yesterday to 1.7 today. Patient reports good urine output. Not on any nephrotoxic meds. Will continue to monitor.     Suarez: None  Diet: DIET CARDIAC;  Code:Full Code  DVT PPX: Lovenox SQ  Disposition: Pending cardiac evaluation    Cecilia Luong MD   6/24/2020 4:33 PM

## 2020-06-25 VITALS
BODY MASS INDEX: 21.15 KG/M2 | DIASTOLIC BLOOD PRESSURE: 55 MMHG | OXYGEN SATURATION: 99 % | HEIGHT: 66 IN | HEART RATE: 64 BPM | WEIGHT: 131.6 LBS | SYSTOLIC BLOOD PRESSURE: 126 MMHG | TEMPERATURE: 98 F | RESPIRATION RATE: 18 BRPM

## 2020-06-25 PROBLEM — J44.1 COPD EXACERBATION (HCC): Status: RESOLVED | Noted: 2019-03-22 | Resolved: 2020-06-25

## 2020-06-25 LAB
A/G RATIO: 0.8 (ref 1.1–2.2)
ALBUMIN SERPL-MCNC: 3 G/DL (ref 3.4–5)
ALP BLD-CCNC: 104 U/L (ref 40–129)
ALT SERPL-CCNC: 9 U/L (ref 10–40)
ANION GAP SERPL CALCULATED.3IONS-SCNC: 11 MMOL/L (ref 3–16)
APTT: 26.3 SEC (ref 24.2–36.2)
AST SERPL-CCNC: 14 U/L (ref 15–37)
BASOPHILS ABSOLUTE: 0 K/UL (ref 0–0.2)
BASOPHILS RELATIVE PERCENT: 0.1 %
BILIRUB SERPL-MCNC: <0.2 MG/DL (ref 0–1)
BUN BLDV-MCNC: 54 MG/DL (ref 7–20)
CALCIUM SERPL-MCNC: 8.5 MG/DL (ref 8.3–10.6)
CHLORIDE BLD-SCNC: 108 MMOL/L (ref 99–110)
CO2: 21 MMOL/L (ref 21–32)
CREAT SERPL-MCNC: 1.8 MG/DL (ref 0.6–1.2)
D DIMER: 214 NG/ML DDU (ref 0–229)
EOSINOPHILS ABSOLUTE: 0 K/UL (ref 0–0.6)
EOSINOPHILS RELATIVE PERCENT: 0.2 %
FIBRINOGEN: 286 MG/DL (ref 200–397)
GFR AFRICAN AMERICAN: 33
GFR NON-AFRICAN AMERICAN: 27
GLOBULIN: 3.6 G/DL
GLUCOSE BLD-MCNC: 98 MG/DL (ref 70–99)
HCT VFR BLD CALC: 30.7 % (ref 36–48)
HEMOGLOBIN: 9.8 G/DL (ref 12–16)
INR BLD: 0.9 (ref 0.86–1.14)
LV EF: 72 %
LVEF MODALITY: NORMAL
LYMPHOCYTES ABSOLUTE: 1.7 K/UL (ref 1–5.1)
LYMPHOCYTES RELATIVE PERCENT: 15.3 %
MCH RBC QN AUTO: 29 PG (ref 26–34)
MCHC RBC AUTO-ENTMCNC: 31.8 G/DL (ref 31–36)
MCV RBC AUTO: 91.2 FL (ref 80–100)
MONOCYTES ABSOLUTE: 0.6 K/UL (ref 0–1.3)
MONOCYTES RELATIVE PERCENT: 5.6 %
NEUTROPHILS ABSOLUTE: 8.8 K/UL (ref 1.7–7.7)
NEUTROPHILS RELATIVE PERCENT: 78.8 %
PDW BLD-RTO: 14.3 % (ref 12.4–15.4)
PLATELET # BLD: 244 K/UL (ref 135–450)
PMV BLD AUTO: 10.3 FL (ref 5–10.5)
POTASSIUM REFLEX MAGNESIUM: 4.5 MMOL/L (ref 3.5–5.1)
PROTHROMBIN TIME: 10.4 SEC (ref 10–13.2)
RBC # BLD: 3.37 M/UL (ref 4–5.2)
SODIUM BLD-SCNC: 140 MMOL/L (ref 136–145)
TOTAL PROTEIN: 6.6 G/DL (ref 6.4–8.2)
WBC # BLD: 11.2 K/UL (ref 4–11)

## 2020-06-25 PROCEDURE — 85384 FIBRINOGEN ACTIVITY: CPT

## 2020-06-25 PROCEDURE — 3430000000 HC RX DIAGNOSTIC RADIOPHARMACEUTICAL: Performed by: INTERNAL MEDICINE

## 2020-06-25 PROCEDURE — 97165 OT EVAL LOW COMPLEX 30 MIN: CPT

## 2020-06-25 PROCEDURE — 94640 AIRWAY INHALATION TREATMENT: CPT

## 2020-06-25 PROCEDURE — 78452 HT MUSCLE IMAGE SPECT MULT: CPT | Performed by: INTERNAL MEDICINE

## 2020-06-25 PROCEDURE — 97530 THERAPEUTIC ACTIVITIES: CPT

## 2020-06-25 PROCEDURE — 80053 COMPREHEN METABOLIC PANEL: CPT

## 2020-06-25 PROCEDURE — 85025 COMPLETE CBC W/AUTO DIFF WBC: CPT

## 2020-06-25 PROCEDURE — A9502 TC99M TETROFOSMIN: HCPCS | Performed by: INTERNAL MEDICINE

## 2020-06-25 PROCEDURE — 97161 PT EVAL LOW COMPLEX 20 MIN: CPT

## 2020-06-25 PROCEDURE — 36415 COLL VENOUS BLD VENIPUNCTURE: CPT

## 2020-06-25 PROCEDURE — 85730 THROMBOPLASTIN TIME PARTIAL: CPT

## 2020-06-25 PROCEDURE — 6360000002 HC RX W HCPCS: Performed by: INTERNAL MEDICINE

## 2020-06-25 PROCEDURE — 93017 CV STRESS TEST TRACING ONLY: CPT | Performed by: INTERNAL MEDICINE

## 2020-06-25 PROCEDURE — 85379 FIBRIN DEGRADATION QUANT: CPT

## 2020-06-25 PROCEDURE — 85610 PROTHROMBIN TIME: CPT

## 2020-06-25 PROCEDURE — 99233 SBSQ HOSP IP/OBS HIGH 50: CPT | Performed by: INTERNAL MEDICINE

## 2020-06-25 PROCEDURE — 94761 N-INVAS EAR/PLS OXIMETRY MLT: CPT

## 2020-06-25 RX ORDER — AMINOPHYLLINE DIHYDRATE 25 MG/ML
100 INJECTION, SOLUTION INTRAVENOUS ONCE
Status: COMPLETED | OUTPATIENT
Start: 2020-06-25 | End: 2020-06-25

## 2020-06-25 RX ADMIN — AMINOPHYLLINE DIHYDRATE 100 MG: 25 INJECTION, SOLUTION INTRAVENOUS at 09:45

## 2020-06-25 RX ADMIN — GLYCOPYRROLATE AND FORMOTEROL FUMARATE 2 PUFF: 9; 4.8 AEROSOL, METERED RESPIRATORY (INHALATION) at 07:43

## 2020-06-25 RX ADMIN — REGADENOSON 0.4 MG: 0.08 INJECTION, SOLUTION INTRAVENOUS at 09:30

## 2020-06-25 RX ADMIN — TETROFOSMIN 30 MILLICURIE: 1.38 INJECTION, POWDER, LYOPHILIZED, FOR SOLUTION INTRAVENOUS at 09:40

## 2020-06-25 RX ADMIN — Medication 2 PUFF: at 07:44

## 2020-06-25 RX ADMIN — Medication 2 PUFF: at 15:33

## 2020-06-25 RX ADMIN — TETROFOSMIN 10 MILLICURIE: 1.38 INJECTION, POWDER, LYOPHILIZED, FOR SOLUTION INTRAVENOUS at 08:05

## 2020-06-25 RX ADMIN — Medication 2 PUFF: at 12:47

## 2020-06-25 ASSESSMENT — PAIN SCALES - GENERAL
PAINLEVEL_OUTOF10: 0
PAINLEVEL_OUTOF10: 0

## 2020-06-25 NOTE — PROGRESS NOTES
(04/29/2015); Colonoscopy; Abdomen surgery; other surgical history (Left, 10/24/2016); other surgical history (03/22/2017); and Cystoscopy (Right, 1/16/2020). Restrictions  Restrictions/Precautions  Restrictions/Precautions: Fall Risk(low fall risk)  Required Braces or Orthoses?: No  Position Activity Restriction  Other position/activity restrictions: 38-year-old white female with history of COPD, asthma, bipolar disease, depression, type II DM, hypertension, osteoarthritis, primary bladder Ca who was admitted on 6/23 for management of COPD exacerbation and elevated troponins.   Vision/Hearing  Vision: Impaired  Vision Exceptions: Wears glasses for reading(implants, has glasses for reading)  Hearing: Within functional limits     Subjective  General  Chart Reviewed: Yes  Response To Previous Treatment: Not applicable  Family / Caregiver Present: No  Diagnosis: COPD exacerbation  Follows Commands: Within Functional Limits  General Comment  Comments: Pt sitting up in chair upon arrival.  Subjective  Subjective: Pt agreeable to PT/OT eval.  Pain Screening  Patient Currently in Pain: Denies  Vital Signs  Patient Currently in Pain: Denies       Orientation  Orientation  Overall Orientation Status: Within Normal Limits  Social/Functional History  Social/Functional History  Lives With: Alone(with dog)  Type of Home: Mobile home  Home Layout: One level  Home Access: Ramped entrance  Bathroom Shower/Tub: Tub/Shower unit, Walk-in shower  Bathroom Toilet: Standard  Bathroom Equipment: Toilet raiser, Tub transfer bench  Home Equipment: Cane, Rolling walker  ADL Assistance: Independent  Homemaking Assistance: Independent  Ambulation Assistance: Independent  Transfer Assistance: Independent  Active : No  Patient's  Info: Pt's children drive pt to [employer]  Leisure & Hobbies: murali, sewing  Additional Comments: Pt denies recent falls  Cognition        Objective     Observation/Palpation  Posture: Good    AROM RLE (degrees)  RLE AROM: WNL  AROM LLE (degrees)  LLE AROM : WNL  Strength RLE  Strength RLE: WNL  Strength LLE  Strength LLE: WNL  Tone RLE  RLE Tone: Normotonic  Tone LLE  LLE Tone: Normotonic  Motor Control  Gross Motor?: WFL  Sensation  Overall Sensation Status: WFL  Bed mobility  Supine to Sit: Unable to assess  Comment: Pt sitting in chair at beginning and end of session, anticipate no difficulty given functional mobility observed during evaluation  Transfers  Sit to Stand: Independent  Stand to sit: Independent  Ambulation  Ambulation?: Yes  Ambulation 1  Surface: level tile  Device: No Device  Assistance: Independent  Quality of Gait: Pt ambulates with good step length, mild forward flexed posture, no LOB. Distance: ~40 ft in room  Stairs/Curb  Stairs?: No     Balance  Posture: Good  Sitting - Static: Good  Sitting - Dynamic: Good  Standing - Static: Good  Standing - Dynamic: Good        Plan   Plan  Times per week: D/C  Safety Devices  Type of devices:  All fall risk precautions in place, Call light within reach, Left in chair, Nurse notified  Restraints  Initially in place: No    G-Code       OutComes Score                                                  AM-PAC Score  AM-PAC Inpatient Mobility Raw Score : 24 (06/25/20 1310)  AM-PAC Inpatient T-Scale Score : 61.14 (06/25/20 1310)  Mobility Inpatient CMS 0-100% Score: 0 (06/25/20 1310)  Mobility Inpatient CMS G-Code Modifier : [address] (06/25/20 1310)          Goals  Short term goals  Time Frame for Short term goals: None identified       Therapy Time   Individual Concurrent Group Co-treatment   Time In 1157         Time Out 1227         Minutes 30         Timed Code Treatment Minutes: 300 Ayaan Georges, PT   Spenser Cobb, PT, DPT, [id number]

## 2020-06-25 NOTE — PROGRESS NOTES
Instructed on Lexiscan Stress Test Procedure including possible side effects/ adverse reactions. Patient verbalizes  understanding and denies having any questions. See [address] [place] [address] Rd Cardiology.   Afia Velazco RN

## 2020-06-25 NOTE — PROGRESS NOTES
Objective   Vision: Impaired  Vision Exceptions: Wears glasses for reading(implants, has glasses for reading)  Hearing: Within functional limits    Orientation  Overall Orientation Status: Within Functional Limits     Balance  Sitting Balance: Independent  Standing Balance: Independent  Functional Mobility  Functional - Mobility Device: No device  Activity: Other  Assist Level: Independent  Functional Mobility Comments: ~30' around room total - pt in environmental precautions, remained in room for mobility  ADL  Additional Comments: Declines ADL. Reports she showered with supervision of nurse, has had no difficulties managing socks and hospital pants  Tone RUE  RUE Tone: Normotonic  Tone LUE  LUE Tone: Normotonic  Coordination  Movements Are Fluid And Coordinated: Yes        Transfers  Sit to stand: Independent  Stand to sit:  Independent     Cognition  Overall Cognitive Status: WFL        Sensation  Overall Sensation Status: WFL        LUE AROM (degrees)  LUE AROM : WFL  RUE AROM (degrees)  RUE AROM : WFL  LUE Strength  Gross LUE Strength: WFL  RUE Strength  Gross RUE Strength: WFL                   Plan   Plan  Times per week: eval/dc    G-Code     OutComes Score                                                  AM-PAC Score        AM-[facility] Inpatient Daily Activity Raw Score: 24 (06/25/20 1249)  AM-PAC Inpatient ADL T-Scale Score : 57.54 (06/25/20 1249)  ADL Inpatient CMS 0-100% Score: 0 (06/25/20 1249)  ADL Inpatient CMS G-Code Modifier : CH (06/25/20 1249)    Goals  Short term goals  Time Frame for Short term goals: eval/dc       Therapy Time   Individual Concurrent Group Co-treatment   Time In 4796         Time Out 1227         Minutes 30            Timed Code Treatment Minutes:   15 minutes    Total Treatment Minutes:  30 minutes    LIAM Vital OTR/L [id number]    Claudy Morin OT

## 2020-06-25 NOTE — DISCHARGE SUMMARY
[facility]    Patient Demographics    Patient. Shalonda Oakes  Date of Birth. 1945  MRN. [id number]     Primary care provider. No primary care provider on file. (Tel: None)    Admit date: 6/23/2020    Discharge date (blank if same as Note Date): Note Date: 6/25/2020     Reason for Hospitalization. COPD exacerbation    Elevated troponins    Significant Findings. Elevated trponins    Problems and results from this hospitalization that need follow up. 1. COPD  2. Hypertension  3. Chronic kidney disease    Significant test results and incidental findings. Nuclear stress test  SPECT images demonstrate homogeneous tracer distribution throughout the    myocardium.    There is normal isotope uptake at stress and rest. There is no evidence of    myocardial ischemia or scar.    Normal LV size and systolic function.    Left ventricular ejection fraction of 72 %. NM Cardiac Stress Test Nuclear Imaging   Final Result      NM LUNG VENT/PERFUSION (VQ)   Final Result   Low probability for pulmonary embolism. Obstructive lung disease. XR CHEST PORTABLE   Final Result   No acute cardiopulmonary pathology. Invasive procedures and treatments. 1. None    Problem-based Hospital Course. Patient is a 68-year-old white female with history of COPD, asthma, bipolar disease, depression, type II DM, hypertension, osteoarthritis, primary bladder Ca who was admitted on 6/23 for management of COPD exacerbation and elevated troponins. Patient was started on steroids. COVID-19 test sent out. Pulmonology service consulted while inpatient. Patient was on albuterol inhaler and BEVESPI. Showed clinical improvement. Patient was educated on smoking cessation. For elevated troponins -highest to 0.06 (normal EKG findings ) cardiology was consulted.  Patient underwent nuclear stress test on 6/25 which did not show any evidence of myocardial ischemia or scarring. Patient was stable from cardiac standpoint for discharge home today. Consults. IP CONSULT TO PULMONOLOGY  IP CONSULT TO CARDIOLOGY    Physical examination on discharge day. BP (!) 126/55   Pulse 64   Temp 98 °F (36.7 °C) (Temporal)   Resp 18   Ht 5' 6\" (1.676 m)   Wt 131 lb 9.6 oz (59.7 kg)   SpO2 99%   BMI 21.24 kg/m²   General appearance. Alert. Looks comfortable. HEENT. Sclera clear. Moist mucus membranes. Cardiovascular. Regular rate and rhythm, normal S1, S2. No murmur. Respiratory. Not using accessory muscles. Clear to auscultation bilaterally, no wheeze. Gastrointestinal. Abdomen soft, non-tender, not distended, normal bowel sounds  Neurology. Facial symmetry. No speech deficits. Moving all extremities equally. Extremities. No edema in lower extremities. Skin. Warm, dry, normal turgor    Condition at time of discharge stable    Medication instructions provided to patient at discharge. Medication List      CONTINUE taking these medications    acetaminophen 500 MG tablet  Commonly known as:  TYLENOL     albuterol sulfate  (90 Base) MCG/ACT inhaler  Use 2 puffs 4 times daily for 7 days then as needed for wheezing. Dispense with Spacer and instruct in use. At patient's preference may use 60 dose MDI. May Sub Pro-Air or Proventil as needed per insurance.      ipratropium-albuterol 0.5-2.5 (3) MG/3ML Soln nebulizer solution  Commonly known as:  DUONEB  Inhale 3 mLs into the lungs every 4 hours as needed for Shortness of Breath     vitamin B-12 1000 MCG extended release tablet  Take 1 tablet by mouth daily        STOP taking these medications    hydrALAZINE 25 MG tablet  Commonly known as:  APRESOLINE     LISINOPRIL PO     metoprolol succinate 25 MG extended release tablet  Commonly known as:  TOPROL XL     mometasone-formoterol 200-5 MCG/ACT inhaler  Commonly known as:  Dulera     nicotine 14 MG/24HR  Commonly known as:  NICODERM CQ     venlafaxine 75 MG tablet  Commonly

## 2020-06-26 LAB
EKG ATRIAL RATE: 67 BPM
EKG DIAGNOSIS: NORMAL
EKG P AXIS: 119 DEGREES
EKG P-R INTERVAL: 176 MS
EKG Q-T INTERVAL: 414 MS
EKG QRS DURATION: 84 MS
EKG QTC CALCULATION (BAZETT): 437 MS
EKG R AXIS: 186 DEGREES
EKG T AXIS: 112 DEGREES
EKG VENTRICULAR RATE: 67 BPM

## 2020-07-22 RX ORDER — ALBUTEROL SULFATE 90 UG/1
AEROSOL, METERED RESPIRATORY (INHALATION)
Qty: 1 INHALER | Refills: 0 | Status: SHIPPED | OUTPATIENT
Start: 2020-07-22 | End: 2020-01-01 | Stop reason: SDUPTHER

## 2020-07-22 NOTE — TELEPHONE ENCOUNTER
albuterol sulfate  (90 Base) MCG/ACT inhaler 2 puff (Discontinued)  2 puff  4 TIMES DAILY  6/24/2020 6/25/2020      Pharmacy:  [facility] [address] [place] [address] [phone] Gabby Roman [phone]     Patient has an appointment on 7*30*20 with DORITA

## 2020-09-07 NOTE — ED PROVIDER NOTES
[facility] Emergency Department      Pt Name: Jamal Turner  MRN: [id number]  [place] 1945  Date of evaluation: 9/7/2020  Provider: Mayra Vitale MD  I independently performed a history and physical on Jamal Turner. All diagnostic, treatment, and disposition decisions were made by myself in conjunction with the advanced practice provider. HPI: Jamal Turner presented with   Chief Complaint   Patient presents with    Shortness of Breath     IN VIA FF MEDIC FROM HOME. HX OF COPD INCREASED SOB     Jamal Turner has a past medical history of Arthritis, Asthma, Bipolar 1 disorder ([address] Cancer ([facility] COPD (chronic obstructive pulmonary disease) ([facility] Depression, Diabetes mellitus ([facility] Hypertension, Neuropathy, Obesity, unspecified, Osteoarthrosis, unspecified whether generalized or localized, lower leg, PVD (peripheral vascular disease) ([facility] Tobacco use disorder, and Unspecified cataract. She has a past surgical history that includes Cholecystectomy; Gastric bypass surgery; Bladder removal; joint replacement; eye surgery; Cystoscopy (4-7-10); Cystoscopy (7/14/10); Cystoscopy (12/15/10); Cystoscopy (3/2/11); Cystoscopy (6-); Cystoscopy (10/19/11); Cystoscopy (4/11/12); Cystocopy (10/8/12); Cystocopy (4/7/14); Total knee arthroplasty (Left); Femur fracture surgery (Left, [id number]); Cystoscopy (04/29/2015); Colonoscopy; Abdomen surgery; other surgical history (Left, 10/24/2016); other surgical history (03/22/2017); and Cystoscopy (Right, 1/16/2020). No current facility-administered medications on file prior to encounter. Current Outpatient Medications on File Prior to Encounter   Medication Sig Dispense Refill    albuterol sulfate  (90 Base) MCG/ACT inhaler Use 2 puffs 4 times daily for 7 days then as needed for wheezing. Dispense with Spacer and instruct in use. At patient's preference may use 60 dose MDI. May Sub Pro-Air or Proventil as needed per insurance. appropriate discharge instructions. Referral to follow up provider. For further details of [facility] encounter, please see documentation by advanced practice provider DAMEON Emery.     Labs Reviewed   CBC WITH AUTO DIFFERENTIAL - Abnormal; Notable for the following components:       Result Value    RBC 3.41 (*)     Hemoglobin 9.7 (*)     Hematocrit 30.1 (*)     RDW 15.6 (*)     All other components within normal limits    Narrative:     Performed at:  [facility] [address], [facility], [address]   Phone [phone]   COMPREHENSIVE METABOLIC PANEL - Abnormal; Notable for the following components:    Sodium 135 (*)     BUN 34 (*)     CREATININE 2.0 (*)     GFR Non- 24 (*)     GFR African American 29 (*)     Albumin/Globulin Ratio 0.9 (*)     Alkaline Phosphatase 174 (*)     All other components within normal limits    Narrative:     Performed at:  [facility] [address], [facility], [facility] [address]   Phone [phone]  - Abnormal; Notable for the following components:    Pro-BNP 1,659 (*)     All other components within normal limits    Narrative:     Performed at:  [facility] [address], [facility], [address]   Phone [phone]   LACTATE DEHYDROGENASE - Abnormal; Notable for the following components:     (*)     All other components within normal limits    Narrative:     Performed at:  [facility] [address], [facility], [address]   Phone [phone]   TROPONIN    Narrative:     Performed at:  [facility] [address], [facility], [facility] [address]   Phone [phone]   MAGNESIUM    Narrative:     Performed at:  [facility] [address], [facility], [facility] [address]   Phone [phone]   LACTIC ACID, PLASMA Narrative:     Performed at:  [facility] [address], [place], [address]   Phone [phone]     Previous BUN/crea:    BUN   Date/Time Value Ref Range Status   09/07/2020 03:58 PM 34 (H) 7 - 20 mg/dL Final   06/25/2020 04:26 AM 54 (H) 7 - 20 mg/dL Final   06/24/2020 04:19 AM 41 (H) 7 - 20 mg/dL Final     CREATININE   Date/Time Value Ref Range Status   09/07/2020 03:58 PM 2.0 (H) 0.6 - 1.2 mg/dL Final   06/25/2020 04:26 AM 1.8 (H) 0.6 - 1.2 mg/dL Final   06/24/2020 04:19 AM 1.7 (H) 0.6 - 1.2 mg/dL Final     Previous HGB:    Hemoglobin   Date/Time Value Ref Range Status   09/07/2020 03:58 PM 9.7 (L) 12.0 - 16.0 g/dL Final   06/25/2020 04:26 AM 9.8 (L) 12.0 - 16.0 g/dL Final   06/24/2020 04:19 AM 10.3 (L) 12.0 - 16.0 g/dL Final     RADIOLOGY:     Plain x-rays were viewed by me:   XR CHEST PORTABLE   Final Result   1. No acute abnormality. EKG:  Read by me in the absence of a cardiologist shows: Sinus rhythm, rate 77, intervals normal, Axis XX degrees, no acute injury pattern, no major change from prior study from 23 June 2020    Medications administered:  Medications   ipratropium-albuterol (DUONEB) nebulizer solution 1 ampule (1 ampule Inhalation Given 9/7/20 1613)   dexamethasone (PF) (DECADRON) injection 6 mg (6 mg Intravenous Given 9/7/20 1823)     Vitals:    09/07/20 1700 09/07/20 1730 09/07/20 1800 09/07/20 1830   BP: (!) 135/47 (!) 143/51 (!) 142/53 (!) 139/44   Pulse: 86 84 85 81   Resp: 28 19 27 25   Temp:       TempSrc:       SpO2: 94% 96% 96% 94%   Weight:       Height:         FOLLOW UP:    [address] Pre-Services  [phone]  Schedule an appointment as soon as possible for a visit       [facility] Emergency Department  [address]  [address]  Go to   If symptoms worsen    FINAL IMPRESSION:    1. COPD exacerbation ([address]    2. Anxiety state    3. Chronic anemia    4.  Chronic kidney disease,

## 2020-09-07 NOTE — ED NOTES
Pt dressed and sitting up in bed. Pt with bed bugs. This RN spoke with pt's son and he is on his way to pick her up.      Gabriel Polanco RN  09/07/20 1900

## 2020-09-07 NOTE — ED NOTES
Bed: 20  Expected date:   Expected time:   Means of arrival:   Comments:  Jing Foster RN  09/07/20 1875

## 2020-09-07 NOTE — ED PROVIDER NOTES
[facility]        Pt Name: Chava Tenorio  MRN: [id number]  [place] 1945  Date of evaluation: 9/7/2020  Provider: Clemente Murphy PA-C  PCP: No primary care provider on file. I have seen and evaluated this patient with my supervising physician Sam Plummer, *. CHIEF COMPLAINT       Chief Complaint   Patient presents with    Shortness of Breath     IN VIA [facility] FROM HOME. HX OF COPD INCREASED SOB       HISTORY OF PRESENT ILLNESS   (Location, Timing/Onset, Context/Setting, Quality, Duration, Modifying Factors, Severity, Associated Signs and Symptoms)  Note limiting factors. Chava Tenorio is a 66 y.o. female presents to the emergency department with a chief complaint of shortness of breath. She states she has some chronic shortness of breath but that got worse today. She states she has been having a cough that is nonproductive. Had some increased wheezing. Was placed on oxygen and squad but satting in the mid 90s on room air here. Does not wear oxygen at home. Has history of COPD. She is a former smoker. Denies any leg swelling, history of CHF, nausea, vomiting, fevers, chest pain, abdominal pain or urinary or bowel symptoms. Denies any pain at all. Nursing Notes were all reviewed and agreed with or any disagreements were addressed in the HPI. REVIEW OF SYSTEMS    (2-9 systems for level 4, 10 or more for level 5)     Review of Systems    Positives and Pertinent negatives as per HPI. Except as noted above in the ROS, all other systems were reviewed and negative.        PAST MEDICAL HISTORY     Past Medical History:   Diagnosis Date    Arthritis     Asthma     [facility]     Cancer ([facility]     bladder    COPD (chronic obstructive pulmonary disease) ([facility]     Depression     controlled w/meds    Diabetes mellitus ([facility]     diet control    Hypertension     Neuropathy Aspirin; Ibuprofen; Adhesive tape; Hydrochlorothiazide; Microderm base [albolene]; Nsaids; Voltaren [diclofenac sodium]; and Procardia [nifedipine]    FAMILYHISTORY       Family History   Problem Relation Age of Onset    Heart Disease Brother     Anesth Problems Mother     Heart Disease Mother     Stroke Brother     Heart Disease Father           SOCIAL HISTORY       Social History     Tobacco Use    Smoking status: Former Smoker     Packs/day: 1.00     Years: 55.00     Pack years: 55.00     Types: Cigarettes    Smokeless tobacco: Never Used    Tobacco comment: QUIT 6 MONTHS AGO   Substance Use Topics    Alcohol use: Not Currently     Alcohol/week: 0.0 standard drinks    Drug use: No       SCREENINGS             PHYSICAL EXAM    (up to 7 for level 4, 8 or more for level 5)     ED Triage Vitals   BP Temp Temp Source Pulse Resp SpO2 Height Weight   09/07/20 1541 09/07/20 1544 09/07/20 1544 09/07/20 1541 09/07/20 1541 09/07/20 1541 09/07/20 1544 09/07/20 1544   (!) 145/53 98.2 °F (36.8 °C) Oral 84 (!) 36 96 % 5' 6\" (1.676 m) 131 lb (59.4 kg)       Physical Exam  Vitals signs and nursing note reviewed. Constitutional:       Appearance: She is well-developed. She is not diaphoretic. HENT:      Head: Atraumatic. Nose: Nose normal.   Eyes:      General:         Right eye: No discharge. Left eye: No discharge. Neck:      Musculoskeletal: Normal range of motion. Cardiovascular:      Rate and Rhythm: Normal rate and regular rhythm. Heart sounds: No murmur. No friction rub. No gallop. Pulmonary:      Effort: Pulmonary effort is normal. No respiratory distress. Breath sounds: No stridor. Rhonchi present. No wheezing or rales. Comments: Mild rhonchi without any retractions or accessory muscle use  Abdominal:      General: Bowel sounds are normal. There is no distension. Palpations: Abdomen is soft. There is no mass. Tenderness: There is no abdominal tenderness.  There is no guarding or rebound. Hernia: No hernia is present. Musculoskeletal: Normal range of motion. General: No swelling. Right lower leg: Edema present. Left lower leg: Edema present. Comments: 1+ pedal edema on the right and trace on the left. Skin:     General: Skin is warm and dry. Findings: No erythema or rash. Neurological:      Mental Status: She is alert and oriented to person, place, and time. Cranial Nerves: No cranial nerve deficit.    Psychiatric:         Behavior: Behavior normal.         DIAGNOSTIC RESULTS   LABS:    Labs Reviewed   CBC WITH AUTO DIFFERENTIAL - Abnormal; Notable for the following components:       Result Value    RBC 3.41 (*)     Hemoglobin 9.7 (*)     Hematocrit 30.1 (*)     RDW 15.6 (*)     All other components within normal limits    Narrative:     Performed at:  [facility] [address]   Phone [phone]   COMPREHENSIVE METABOLIC PANEL - Abnormal; Notable for the following components:    Sodium 135 (*)     BUN 34 (*)     CREATININE 2.0 (*)     GFR Non- 24 (*)     GFR African American 29 (*)     Albumin/Globulin Ratio 0.9 (*)     Alkaline Phosphatase 174 (*)     All other components within normal limits    Narrative:     Performed at:  [facility] [address]   Phone 21  - Abnormal; Notable for the following components:    Pro-BNP 1,659 (*)     All other components within normal limits    Narrative:     Performed at:  [facility] [address]   Phone [phone]   LACTATE DEHYDROGENASE - Abnormal; Notable for the following components:     (*)     All other components within normal limits    Narrative:     Performed at:  [facility] [address] suspicion for pneumothorax, aortic dissection, pulmonary embolus or other emergent etiology. Please see attending note for final disposition. Patient was stable time of turnover at 4:30 PM.    FINAL IMPRESSION    1. Shortness of breath  2. COPD    DISPOSITION/PLAN   DISPOSITION        PATIENT REFERREDTO:  No follow-up provider specified.     DISCHARGE MEDICATIONS:  New Prescriptions    No medications on file       DISCONTINUED MEDICATIONS:  Discontinued Medications    No medications on file              (Please note that portions of this note were completed with a voice recognition program.  Efforts were made to edit the dictations but occasionally words are mis-transcribed.)    Stephanie Muller PA-C (electronically signed)           Stephanie Muller PA-C  09/07/20 9311

## 2020-09-07 NOTE — ED NOTES
Pt lying in bed. Denies any needs at the present time. VSS. Bed in lowest position and locked, alarm engaged. Call light in reach.        Braxton Gamez RN  09/07/20 8086

## 2020-09-07 NOTE — ED NOTES
Discharge instructions given, patient acknowledged understanding, patient ambulated out of ed upon discharge      Yang Mensah RN  09/07/20 2464

## 2020-09-08 NOTE — CARE COORDINATION
Call from [facility] , they will see pt at office. Scheduled for thurs at 1130           Call to patient made aware of appt Thursday 9/10 at 1130 am.       Patient contacted regarding Radha Forbes. Discussed COVID-19 related testing which was available at this time. Test results were negative. Patient informed of results, if available?  Yes

## 2020-09-08 NOTE — CARE COORDINATION
appointment was previously scheduled, appointment scheduling offered: Yes-- see note above  [facility] follow up appointment(s): No future appointments. Non-[facility] follow up appointment(s):     Non-face-to-face services provided:  Scheduled appointment with PCP-called [facility] to see if they will see her- she was w DR Kristy Smith for over 12 years but he retired then she no showed for [place] Pt appt and was dismissed  Obtained and reviewed discharge summary and/or continuity of care documents  Reviewed and followed up on pending diagnostic tests and treatments-covid pending  Education of patient/family/caregiver/guardian to support self-management-Call to [facility] -  called in Duoneb medication for nebulizer  Assessment and support for treatment adherence and medication management-[facility] [address] in accessing community resources-COPD     Advance Care Planning:   Does patient have an Advance Directive:  not on file. Patient has following risk factors of: COPD. CTN/ACM reviewed discharge instructions, medical action plan and red flags such as increased shortness of breath, increasing fever and signs of decompensation with patient who verbalized understanding. Discussed exposure protocols and quarantine with CDC Guidelines What to do if you are sick with coronavirus disease 2019.  Patient was given an opportunity for questions and concerns. The patient agrees to contact the [facility] exposure line [phone], [facility] department [place] [facility]: ([phone]) and PCP office for questions related to their healthcare. CTN/ACM provided contact information for future needs.     Reviewed and educated patient on any new and changed medications related to discharge diagnosis     Patient/family/caregiver given information for [employer] and agrees to enroll no  Patient's preferred e-mail:    Patient's preferred phone number:   Based on Loop alert triggers, patient will be contacted by nurse

## 2020-09-08 NOTE — TELEPHONE ENCOUNTER
Spoke with Marcelino Petersen (care coordinator) of [facility]) [phone] . Patient is needing to establish with a provider for her COPD and medications but no showed to her appt w/ Jessica Gonzalez on 7/30 then received a dismissal letter from the practice. Patient missed the appt due to being in and out of hospital.     They want to know if Jessica Gonzalez would re-consider being her PCP b/c patient has been with practice for 15 years. She would like a call back and will inform patient if Jessica Gonzalez will see her or not.

## 2020-09-13 NOTE — ED PROVIDER NOTES
eMERGENCY dEPARTMENT eNCOUnter      [facility]    Chief Complaint   Patient presents with    Shortness of Breath     Pt presents to the ED from home by [facility] with SOB. PT 98% on RA. States she felt she was huffing and puffing while trying to sleep. Pt states she has been her to the ED 4 TIMES in the last 6 weeks Has not followed up woth PCP. Hx of COPD        HPI    Claudean Ohara is a 66 y.o. female who presents with shortness of breath. Patient has been to the emergency department 4 times in the last 6 weeks being worked up. She has known history of COPD. No exacerbating or relieving factors no other associated signs or symptoms. She is not having any chest pain. No pain or swelling lower extremities.   She has bipolar    PAST MEDICAL HISTORY    Past Medical History:   Diagnosis Date    Arthritis     Asthma     Bipolar 1 disorder ([address]     Cancer ([address]     bladder    COPD (chronic obstructive pulmonary disease) ([address]     Depression     controlled w/meds    Diabetes mellitus ([address]     diet control    Hypertension     Neuropathy     toes    Obesity, unspecified     Osteoarthrosis, unspecified whether generalized or localized, lower leg     PVD (peripheral vascular disease) ([address]     Tobacco use disorder     Unspecified cataract        SURGICAL HISTORY    Past Surgical History:   Procedure Laterality Date    ABDOMEN SURGERY      BLADDER REMOVAL      partial    CHOLECYSTECTOMY      COLONOSCOPY      CYSTOSCOPY  4-7-10    fulgeration of bladder tumor    CYSTOSCOPY  7/14/10    Cytology    CYSTOSCOPY  12/15/10    CYSTOSCOPY  3/2/11    COLLAGEN INJECTION    CYSTOSCOPY  6-    cytology    CYSTOSCOPY  10/19/11    CYSTOSCOPY  4/11/12    CYSTOSCOPY  10/8/12    cytology    CYSTOSCOPY  4/7/14    cysto/cytology    CYSTOSCOPY  04/29/2015    Cytology study    CYSTOSCOPY Right 1/16/2020    CYSTOSCOPY RIGHT URETERAL STENT REMOVAL, RIGHT RETROGRADE PYELOGRAM performed by Sincere Matthews MD [facility] at [address] [place] [address]      cataract left eye    FEMUR FRACTURE SURGERY Left [id number]    GASTRIC BYPASS SURGERY      JOINT REPLACEMENT      left knee    OTHER SURGICAL HISTORY Left 10/24/2016    left nephroureterectomy    OTHER SURGICAL HISTORY  03/22/2017    RIGHT ureteral stent placement, RIGHT nephrostomy drain placement     TOTAL KNEE ARTHROPLASTY Left        CURRENT MEDICATIONS    Current Outpatient Rx   Medication Sig Dispense Refill    albuterol sulfate  (90 Base) MCG/ACT inhaler Use 2 puffs 4 times daily for 7 days then as needed for wheezing. Dispense with Spacer and instruct in use. At patient's preference may use 60 dose MDI. May Sub Pro-Air or Proventil as needed per insurance. 1 Inhaler 0    ipratropium-albuterol (DUONEB) 0.5-2.5 (3) MG/3ML SOLN nebulizer solution Inhale 3 mLs into the lungs every 4 hours as needed for Shortness of Breath 360 mL 0    ipratropium-albuterol (DUONEB) 0.5-2.5 (3) MG/3ML SOLN nebulizer solution Inhale 3 mLs into the lungs every 4 hours 360 mL 0    Cyanocobalamin (VITAMIN B-12) 1000 MCG extended release tablet Take 1 tablet by mouth daily 90 tablet 3    acetaminophen (TYLENOL) 500 MG tablet Take 500 mg by mouth every 6 hours as needed for Pain. ALLERGIES    Allergies   Allergen Reactions    Aspirin Shortness Of Breath    Ibuprofen Shortness Of Breath    Adhesive Tape Swelling     Only if it stays on a long time    Hydrochlorothiazide Other (See Comments)     Palpitations.  Microderm Base [Albolene]     Nsaids Other (See Comments)     Respiratory failure.  Voltaren [Diclofenac Sodium]      Wheezing, swelling.      Procardia [Nifedipine] Swelling and Rash       FAMILY HISTORY    Family History   Problem Relation Age of Onset    Heart Disease Brother     Anesth Problems Mother     Heart Disease Mother     Stroke Brother     Heart Disease Father        SOCIAL HISTORY    Social History     Socioeconomic History   

## 2020-09-13 NOTE — ED NOTES
Bed: 06  Expected date:   Expected time:   Means of arrival: [facility]  Comments:  RN has [address] [place] [address]  09/13/20 9934

## 2020-09-13 NOTE — ED NOTES
Pt Discharged in stable condition, VSS, no signs of distress, discharge instructions and meds reviewed. Pt verbalizes understanding and states no further questions or concerns unaddressed.        Kalie Vaughn RN  09/13/20 9034

## 2020-09-14 NOTE — CARE COORDINATION
Ambulatory Care Coordination  ED Follow up Call    Reason for ED visit:  Copd exac   Status:     significantly improved    Did you call your PCP prior to going to the ED? Yes      Did you receive a discharge instructions from the Emergency Room? Yes  Review of Instructions:     Understands what to report/when to return?:  Yes   Understands discharge instructions?:  Yes   Following discharge instructions?:  Yes   If not why? Are there any new complaints of pain? No  New Pain Meds? No    Constipation prophylaxis needed? N/A    If you have a wound is the dressing clean, dry, and intact? N/A  Understands wound care regimen? N/A    Are there any other complaints/concerns that you wish to tell your provider? She had to reschedule appt w new provider due to transporation issues. She will call and reschedule and see if daughter can go with there  Feels great today, sitting outside    FU appts/Provider:    No future appointments. New Medications?:   No      Medication Reconciliation by phone - No  Understands Medications? No  Taking Medications? Uncertain, seems forgetful  Can you swallow your pills? Yes    Any further needs in the home i.e. Equipment?   No    Link to services in community?:  N/A   Which services:  Has COA

## 2020-09-18 NOTE — ED PROVIDER NOTES
[address] [facility]        Pt Name: Keo Mcdowell  MRN: [id number]  [place] 1945  Date of evaluation: 9/18/2020  Provider: WINDY Hernández CNP  PCP: No primary care provider on file. This patient was seen and evaluated by the attending physician Sahil Marte, [address] [place] [address]       Chief Complaint   Patient presents with    Shortness of Breath     Pt. comes in with complaint of SOB, 97% spo2 on room air. hx of COPD       HISTORY OF PRESENT ILLNESS   (Location/Symptom, Timing/Onset,Context/Setting, Quality, Duration, Modifying Factors, Severity)  Note limiting factors. Keo Mcdowell is a 66 y.o. female who presents emergency department with concern for shortness of breath. Symptoms began at 4 PM.  She reports history of COPD. She quit smoking a year ago. She does not wear oxygen at home. Sats 97% on room air. She is placed on 2 L nasal cannula for comfort. She is tachypneic on arrival.  She denies fever, rash, headaches, dizziness, chest pain, cough, congestion, abdominal pain, nausea, vomiting, diarrhea, constipation, blood in the stool, or painful urination. No family at bedside. Nursing Notes triage note reviewed and agreed with or any disagreements were addressed  in the HPI. REVIEW OF SYSTEMS    (2-9 systems for level 4, 10 or more for level 5)     Review of Systems   Constitutional: Negative for chills and fever. HENT: Negative for postnasal drip, rhinorrhea and sore throat. Eyes: Negative for visual disturbance. Respiratory: Positive for shortness of breath. Cardiovascular: Negative for chest pain. Gastrointestinal: Negative for abdominal pain, blood in stool, constipation, diarrhea, nausea and vomiting. Genitourinary: Negative for dysuria, flank pain and hematuria. Skin: Negative for rash. Neurological: Negative for weakness and headaches.    All other systems reviewed RELEASE TABLET    Take 1 tablet by mouth daily         ALLERGIES     Aspirin; Ibuprofen; Adhesive tape; Hydrochlorothiazide; Microderm base [albolene]; Nsaids; Voltaren [diclofenac sodium]; and Procardia [nifedipine]    FAMILY HISTORY       Family History   Problem Relation Age of Onset    Heart Disease Brother     Anesth Problems Mother     Heart Disease Mother     Stroke Brother     Heart Disease Father           SOCIAL HISTORY       Social History     Socioeconomic History    Marital status:       Spouse name: None    Number of children: 2    Years of education: None    Highest education level: None   Occupational History    None   Social Needs    Financial resource strain: None    Food insecurity     Worry: None     Inability: None    Transportation needs     Medical: None     Non-medical: None   Tobacco Use    Smoking status: Former Smoker     Packs/day: 1.00     Years: 55.00     Pack years: 55.00     Types: Cigarettes    Smokeless tobacco: Never Used    Tobacco comment: QUIT 6 MONTHS AGO   Substance and Sexual Activity    Alcohol use: Not Currently     Alcohol/week: 0.0 standard drinks    Drug use: No    Sexual activity: Not Currently   Lifestyle    Physical activity     Days per week: None     Minutes per session: None    Stress: None   Relationships    Social connections     Talks on phone: None     Gets together: None     Attends Worship service: None     Active member of club or organization: None     Attends meetings of clubs or organizations: None     Relationship status: None    Intimate partner violence     Fear of current or ex partner: None     Emotionally abused: None     Physically abused: None     Forced sexual activity: None   Other Topics Concern    None   Social History Narrative    None       SCREENINGS             PHYSICAL EXAM  (up to 7 for level 4, 8 or more for level 5)     ED Triage Vitals [09/18/20 1920]   BP Temp Temp Source Pulse Resp SpO2 Height Weight (!) 147/69 98.2 °F (36.8 °C) Oral 79 21 97 % 5' 6.25\" (1.683 m) 135 lb (61.2 kg)       Physical Exam  Vitals signs and nursing note reviewed. Constitutional:       Appearance: She is well-developed. She is not diaphoretic. HENT:      Head: Normocephalic and atraumatic. Eyes:      General: No scleral icterus. Right eye: No discharge. Left eye: No discharge. Neck:      Musculoskeletal: Normal range of motion and neck supple. Cardiovascular:      Rate and Rhythm: Normal rate and regular rhythm. Heart sounds: Normal heart sounds. No murmur. No friction rub. No gallop. Pulmonary:      Effort: Pulmonary effort is normal. Tachypnea present. No respiratory distress. Breath sounds: No stridor. Examination of the right-lower field reveals wheezing. Wheezing (scattered end-expiratory) present. No rales. Chest:      Chest wall: No tenderness. Abdominal:      General: Bowel sounds are normal. There is no distension. Palpations: Abdomen is soft. There is no mass. Tenderness: There is no abdominal tenderness. There is no guarding or rebound. Musculoskeletal: Normal range of motion. General: No tenderness. Skin:     General: Skin is warm and dry. Coloration: Skin is not pale. Neurological:      Mental Status: She is alert and oriented to person, place, and time.       Coordination: Coordination normal.   Psychiatric:         Behavior: Behavior normal.         DIAGNOSTIC RESULTS   LABS:    Labs Reviewed   CBC WITH AUTO DIFFERENTIAL - Abnormal; Notable for the following components:       Result Value    RBC 3.61 (*)     Hemoglobin 9.8 (*)     Hematocrit 31.2 (*)     All other components within normal limits    Narrative:     Performed at:  [facility] [address], [place], [facility] [address]   Phone [phone]   BASIC METABOLIC PANEL W/ REFLEX TO MG FOR LOW K - Abnormal; Notable for the following components:    CO2 20 (*) Glucose 111 (*)     BUN 41 (*)     CREATININE 1.8 (*)     GFR Non- 27 (*)     GFR  33 (*)     All other components within normal limits    Narrative:     Performed at:  [facility]  [address], [address]   Phone [phone]   BRAIN NATRIURETIC PEPTIDE - Abnormal; Notable for the following components:    Pro- (*)     All other components within normal limits    Narrative:     Performed at:  [facility]  [address], [address]   Phone [phone]   CULTURE, BLOOD 2   CULTURE, BLOOD 1   LACTATE, SEPSIS    Narrative:     Performed at:  [facility]  [address], [address]   Phone [phone]    Narrative:     Kieran Loyaford [id number],  Rejected Test Name bmp,bnp,trop/Called to:Rossy Tena, 09/18/2020 20:49, by  [facility]  Performed at:  [facility]  [address], [address]   Phone [phone]   TROPONIN    Narrative:     Performed at:  [facility]  [address], [place]   Phone [phone]   LACTATE, SEPSIS       All other labs werewithin normal range or not returned as of this dictation. EKG: All EKG's are interpreted by the Emergency Department Physician who either signs or Co-signs this chart in the absence of acardiologist.  Please see their note for interpretation of EKG. RADIOLOGY:   Interpretation per the Radiologist below, if available at the time of this note:    XR CHEST PORTABLE   Final Result   No acute process. Stable compared to prior study           No results found.       PROCEDURES   Unless otherwise noted below, none     Procedures    CRITICAL CARE TIME     There was a high probability of life-threatening clinical deterioration in the patient's condition requiring my urgent intervention. The total critical care time spent while evaluating and treating this patient was at least 32 minutes. This excludes time spent doing separately billable procedures. This includes time at the bedside, data interpretation, medication management, obtaining critical history from collateral sources if the patient is unable to provide it directly, and physician consultation. Specifics of interventions taken and potentially life-threatening diagnostic considerations are listed in the medical decision making. CONSULTS:  None      EMERGENCY DEPARTMENT COURSE and DIFFERENTIAL DIAGNOSIS/MDM:   Vitals:    Vitals:    09/18/20 1920 09/18/20 1930 09/18/20 2031   BP: (!) 147/69 (!) 147/60    Pulse: 79 73    Resp: 21 22 20   Temp: 98.2 °F (36.8 °C)     TempSrc: Oral     SpO2: 97% 100% 100%   Weight: 135 lb (61.2 kg)     Height: 5' 6.25\" (1.683 m)         Tapan Guzman was given the following medications:  Medications   methylPREDNISolone sodium (SOLU-MEDROL) injection 125 mg (125 mg Intravenous Given 9/18/20 2208)   ipratropium-albuterol (DUONEB) nebulizer solution 1 ampule (1 ampule Inhalation Given 9/18/20 2029)   albuterol (PROVENTIL) nebulizer solution 5 mg (5 mg Nebulization Given 9/18/20 2029)       Tapan Guzman was evaluated in the emergency department with concern for COPD exacerbation. Treated with 3 backtracks respiratory treatments and IV steroids in the ER. Her tachypnea improved during her ER stay. She was never hypoxic on room air. Laboratory evaluation is unremarkable. Chest x-ray is negative for acute abnormality. This lowers my suspicion for pneumonia. I feel outpatient management is reasonable. I did refill her home medications and start her on oral steroids.   I estimate there is LOW risk for acute coronary syndrome, respiratory failure/arrest, acute congestive heart failure, cardiac tamponade, pneumonia, pneumothorax, pericarditis, PE, aortic dissection, and ARDS,  thus I consider the discharge disposition reasonable. Shola Fuchs is stable in the ER and safe to follow as an outpatient. The patient is discharged on the following medications. They were counseled on how to take the newly prescribed medications:  New Prescriptions    PREDNISONE (DELTASONE) 10 MG TABLET    Take 6 tablets by mouth daily for 5 doses    . Instructed to follow-up with:  [facility]  Call in 1 day  to schedule an appointment with a new primary care provider    Return to the ER for new or worsening symptoms. The patient tolerated their visit well. They were seen and evaluated by the ED attending physician listed on this chart who agreed with the assessment and plan. The patient and / or the family were informed of the results of any tests, a time was given to answer questions, a plan was proposed and they agreed with plan.         FINAL IMPRESSION      1. COPD exacerbation ([address]          DISPOSITION/PLAN   DISPOSITION Discharge - Pending Orders Complete 09/18/2020 10:51:36 PM        DISCONTINUED MEDICATIONS:  Discontinued Medications    IPRATROPIUM-ALBUTEROL (DUONEB) 0.5-2.5 (3) MG/3ML SOLN NEBULIZER SOLUTION    Inhale 3 mLs into the lungs every 4 hours                (Please note that portions of this note were completed with a voice recognition program.  Efforts were made to edit the dictations but occasionally words are mis-transcribed.)    WINDY Benz CNP (electronically signed)        WINDY Benz CNP  09/18/20 2659

## 2020-09-19 NOTE — ED PROVIDER NOTES
I independently performed a history and physical on Carmen Billingsley. All diagnostic, treatment, and disposition decisions were made by myself in conjunction with the advanced practice provider. Briefly, this is a 66 y.o. female here for shortness of breath worsening over the past 1-2 days. Feels similar to past exacerbations of her COPD. Has been out of her albuterol ampules for her nebulizer. Does not wear oxygen at home. Denies any chest pain or productive cough. On exam, Patient afebrile and nontoxic. No distress. No increased WOB, oxygen saturations % on room air during my evaluation. Heart RRR. Lungs with scattered wheezes. . Abdomen soft, nondistended, nontender to palpation in all quadrants. Screenings            MDM    Patient afebrile and nontoxic. No hypoxia on room air. Felt improved with breathing treatments and was able to ambulate without distress or desaturation. CXR without evidence of pneumonia, pneumothorax or other acute process. Pulmonary embolism considered, patient symptoms consistent with COPD exacerbation and this is not most likely diagnosis. Lab workup reassuring, BNP is modestly elevated however actually improved from patient's past results, clinically she has no evidence of CHF exacerbation. [place] safe for discharge to self care with close PCP follow up and patient is agreeable. Strict return precautions were discussed. Patient Referrals:  Keiry Rodriguez  [phone]  Call in 1 day  to schedule an appointment with a new primary care provider      Discharge Medications:  Discharge Medication List as of 9/19/2020 12:21 AM      START taking these medications    Details   predniSONE (DELTASONE) 10 MG tablet Take 6 tablets by mouth daily for 5 doses, Disp-30 tablet,R-0Print             FINAL IMPRESSION  1. COPD exacerbation (HCC)        Blood pressure (!) 139/56, pulse 74, temperature 98.2 °F (36.8 °C), temperature source Oral, resp.  rate 20, height 5' 6.25\" (1.683 m), weight 135 lb (61.2 kg), SpO2 96 %. For further details of [facility] emergency department encounter, please see documentation by advanced practice provider, Wilman Brown NP.     Lore Elder DO (electronically signed)  Attending Emergency Physician       Lore Elder DO  09/19/20 7323

## 2020-09-21 NOTE — CARE COORDINATION
Patient contacted regarding recent discharge and COVID-19 risk. Discussed COVID-19 related testing which was not done at this time. Test results were not done. Patient informed of results, if available? N/A     Care Transition Nurse/ Ambulatory Care Manager contacted the patient by telephone to perform post discharge assessment. Verified name and  with patient as identifiers. Patient has following risk factors of: Asthma, Ca, DM, and HTN. CTN/ACM reviewed discharge instructions, medical action plan and red flags related to discharge diagnosis. Reviewed and educated them on any new and changed medications related to discharge diagnosis. Advised obtaining a 90-day supply of all daily and as-needed medications. Patient seen in the ED for SOB. Patient says she is doing better today. Patient says she had an episode last night, but she was able to relax, and her breathing was better. Patient says she is going to have her PCP refer her to a Pulmonologist at her next OV. Education provided regarding infection prevention, and signs and symptoms of COVID-19 and when to seek medical attention with patient who verbalized understanding. Discussed exposure protocols and [facility] severe illness 2019 and given an opportunity for questions and concerns. The patient agrees to contact the COVID-19 hotline [phone] or PCP office for questions related to their healthcare. CTN/ACM provided contact information for future reference. From CDC: Are you at higher risk for severe illness?  Wash your hands often.  Avoid close contact (6 feet, which is about two arm lengths) with people who are sick.  Put distance between yourself and other people if COVID-19 is spreading in your community.  Clean and disinfect frequently touched surfaces.  Avoid all cruise travel and non-essential air travel.    Call your healthcare professional if you have concerns about COVID-19 and your underlying condition or if you are sick. For more information on steps you can take to protect yourself, see CDC's How to [address] [facility] for follow-up call in 7-14 days based on severity of symptoms and risk factors.

## 2021-01-01 ENCOUNTER — HOSPITAL ENCOUNTER (EMERGENCY)
Age: 76
Discharge: HOME | End: 2021-07-24
Attending: EMERGENCY MEDICINE
Payer: MEDICARE

## 2021-01-01 ENCOUNTER — TELEPHONE (OUTPATIENT)
Dept: OTHER | Facility: CLINIC | Age: 76
End: 2021-01-01

## 2021-01-01 ENCOUNTER — APPOINTMENT (OUTPATIENT)
Dept: GENERAL RADIOLOGY | Age: 76
DRG: 191 | End: 2021-01-01
Payer: MEDICARE

## 2021-01-01 ENCOUNTER — APPOINTMENT (OUTPATIENT)
Dept: CT IMAGING | Age: 76
DRG: 191 | End: 2021-01-01
Payer: MEDICARE

## 2021-01-01 ENCOUNTER — HOSPITAL ENCOUNTER (INPATIENT)
Age: 76
LOS: 3 days | Discharge: HOME | DRG: 191 | End: 2021-07-22
Attending: FAMILY MEDICINE | Admitting: FAMILY MEDICINE
Payer: MEDICARE

## 2021-01-01 ENCOUNTER — APPOINTMENT (OUTPATIENT)
Dept: GENERAL RADIOLOGY | Age: 76
End: 2021-01-01
Payer: MEDICARE

## 2021-01-01 VITALS
TEMPERATURE: 98.8 F | RESPIRATION RATE: 24 BRPM | SYSTOLIC BLOOD PRESSURE: 138 MMHG | DIASTOLIC BLOOD PRESSURE: 73 MMHG | HEART RATE: 88 BPM | OXYGEN SATURATION: 96 %

## 2021-01-01 VITALS
HEIGHT: 62 IN | OXYGEN SATURATION: 97 % | BODY MASS INDEX: 26.37 KG/M2 | RESPIRATION RATE: 20 BRPM | SYSTOLIC BLOOD PRESSURE: 109 MMHG | WEIGHT: 143.3 LBS | HEART RATE: 89 BPM | DIASTOLIC BLOOD PRESSURE: 55 MMHG | TEMPERATURE: 97.7 F

## 2021-01-01 DIAGNOSIS — R06.02 SHORTNESS OF BREATH: Primary | ICD-10-CM

## 2021-01-01 DIAGNOSIS — J44.1 COPD EXACERBATION (HCC): Primary | ICD-10-CM

## 2021-01-01 DIAGNOSIS — F41.1 ANXIETY STATE: ICD-10-CM

## 2021-01-01 LAB
A/G RATIO: 0.6 (ref 1.1–2.2)
A/G RATIO: 0.8 (ref 1.1–2.2)
ALBUMIN SERPL-MCNC: 2.7 G/DL (ref 3.4–5)
ALBUMIN SERPL-MCNC: 2.8 G/DL (ref 3.1–4.9)
ALBUMIN SERPL-MCNC: 3 G/DL (ref 3.4–5)
ALBUMIN SERPL-MCNC: 3 G/DL (ref 3.4–5)
ALBUMIN SERPL-MCNC: 3.1 G/DL (ref 3.4–5)
ALP BLD-CCNC: 140 U/L (ref 40–129)
ALP BLD-CCNC: 179 U/L (ref 40–129)
ALPHA-1-GLOBULIN: 0.3 G/DL (ref 0.2–0.4)
ALPHA-2-GLOBULIN: 0.8 G/DL (ref 0.4–1.1)
ALT SERPL-CCNC: 10 U/L (ref 10–40)
ALT SERPL-CCNC: 9 U/L (ref 10–40)
ANION GAP SERPL CALCULATED.3IONS-SCNC: 10 MMOL/L (ref 3–16)
ANION GAP SERPL CALCULATED.3IONS-SCNC: 10 MMOL/L (ref 3–16)
ANION GAP SERPL CALCULATED.3IONS-SCNC: 7 MMOL/L (ref 3–16)
ANION GAP SERPL CALCULATED.3IONS-SCNC: 7 MMOL/L (ref 3–16)
ANION GAP SERPL CALCULATED.3IONS-SCNC: 9 MMOL/L (ref 3–16)
APTT: 23.4 SEC (ref 26.2–38.6)
AST SERPL-CCNC: 22 U/L (ref 15–37)
AST SERPL-CCNC: 25 U/L (ref 15–37)
BACTERIA: ABNORMAL /HPF
BASE EXCESS VENOUS: -4 MMOL/L (ref -3–3)
BASOPHILS ABSOLUTE: 0 K/UL (ref 0–0.2)
BASOPHILS ABSOLUTE: 0 K/UL (ref 0–0.2)
BASOPHILS RELATIVE PERCENT: 0.4 %
BASOPHILS RELATIVE PERCENT: 0.5 %
BETA GLOBULIN: 1.4 G/DL (ref 0.9–1.6)
BILIRUB SERPL-MCNC: <0.2 MG/DL (ref 0–1)
BILIRUB SERPL-MCNC: <0.2 MG/DL (ref 0–1)
BILIRUBIN URINE: NEGATIVE
BLOOD, URINE: NEGATIVE
BUN BLDV-MCNC: 29 MG/DL (ref 7–20)
BUN BLDV-MCNC: 32 MG/DL (ref 7–20)
BUN BLDV-MCNC: 35 MG/DL (ref 7–20)
BUN BLDV-MCNC: 38 MG/DL (ref 7–20)
BUN BLDV-MCNC: 46 MG/DL (ref 7–20)
CALCIUM SERPL-MCNC: 8 MG/DL (ref 8.3–10.6)
CALCIUM SERPL-MCNC: 8.3 MG/DL (ref 8.3–10.6)
CALCIUM SERPL-MCNC: 8.3 MG/DL (ref 8.3–10.6)
CALCIUM SERPL-MCNC: 8.4 MG/DL (ref 8.3–10.6)
CALCIUM SERPL-MCNC: 8.5 MG/DL (ref 8.3–10.6)
CARBOXYHEMOGLOBIN: 3.7 % (ref 0–1.5)
CHLORIDE BLD-SCNC: 104 MMOL/L (ref 99–110)
CHLORIDE BLD-SCNC: 106 MMOL/L (ref 99–110)
CHLORIDE BLD-SCNC: 106 MMOL/L (ref 99–110)
CHLORIDE BLD-SCNC: 107 MMOL/L (ref 99–110)
CHLORIDE BLD-SCNC: 108 MMOL/L (ref 99–110)
CHLORIDE URINE RANDOM: 87 MMOL/L
CLARITY: ABNORMAL
CO2: 20 MMOL/L (ref 21–32)
CO2: 21 MMOL/L (ref 21–32)
CO2: 21 MMOL/L (ref 21–32)
CO2: 22 MMOL/L (ref 21–32)
CO2: 23 MMOL/L (ref 21–32)
COLOR: YELLOW
CREAT SERPL-MCNC: 1.8 MG/DL (ref 0.6–1.2)
CREAT SERPL-MCNC: 1.9 MG/DL (ref 0.6–1.2)
CREAT SERPL-MCNC: 1.9 MG/DL (ref 0.6–1.2)
CREAT SERPL-MCNC: 2 MG/DL (ref 0.6–1.2)
CREAT SERPL-MCNC: 2.1 MG/DL (ref 0.6–1.2)
CREATININE URINE: 54.1 MG/DL (ref 28–259)
EKG ATRIAL RATE: 75 BPM
EKG ATRIAL RATE: 84 BPM
EKG ATRIAL RATE: 87 BPM
EKG DIAGNOSIS: NORMAL
EKG P AXIS: 38 DEGREES
EKG P AXIS: 52 DEGREES
EKG P AXIS: 55 DEGREES
EKG P-R INTERVAL: 156 MS
EKG P-R INTERVAL: 166 MS
EKG P-R INTERVAL: 178 MS
EKG Q-T INTERVAL: 382 MS
EKG Q-T INTERVAL: 382 MS
EKG Q-T INTERVAL: 388 MS
EKG QRS DURATION: 88 MS
EKG QRS DURATION: 90 MS
EKG QRS DURATION: 92 MS
EKG QTC CALCULATION (BAZETT): 433 MS
EKG QTC CALCULATION (BAZETT): 451 MS
EKG QTC CALCULATION (BAZETT): 459 MS
EKG R AXIS: 20 DEGREES
EKG R AXIS: 23 DEGREES
EKG R AXIS: 43 DEGREES
EKG T AXIS: 57 DEGREES
EKG T AXIS: 59 DEGREES
EKG T AXIS: 61 DEGREES
EKG VENTRICULAR RATE: 75 BPM
EKG VENTRICULAR RATE: 84 BPM
EKG VENTRICULAR RATE: 87 BPM
EOSINOPHILS ABSOLUTE: 1.1 K/UL (ref 0–0.6)
EOSINOPHILS ABSOLUTE: 2 K/UL (ref 0–0.6)
EOSINOPHILS RELATIVE PERCENT: 18.5 %
EOSINOPHILS RELATIVE PERCENT: 29.6 %
EPITHELIAL CELLS, UA: ABNORMAL /HPF (ref 0–5)
FERRITIN: 15.2 NG/ML (ref 15–150)
FOLATE: 7.36 NG/ML (ref 4.78–24.2)
GAMMA GLOBULIN: 2.3 G/DL (ref 0.6–1.8)
GFR AFRICAN AMERICAN: 28
GFR AFRICAN AMERICAN: 29
GFR AFRICAN AMERICAN: 31
GFR AFRICAN AMERICAN: 31
GFR AFRICAN AMERICAN: 33
GFR NON-AFRICAN AMERICAN: 23
GFR NON-AFRICAN AMERICAN: 24
GFR NON-AFRICAN AMERICAN: 26
GFR NON-AFRICAN AMERICAN: 26
GFR NON-AFRICAN AMERICAN: 27
GLOBULIN: 4.1 G/DL
GLOBULIN: 4.9 G/DL
GLUCOSE BLD-MCNC: 124 MG/DL (ref 70–99)
GLUCOSE BLD-MCNC: 137 MG/DL (ref 70–99)
GLUCOSE BLD-MCNC: 143 MG/DL (ref 70–99)
GLUCOSE BLD-MCNC: 82 MG/DL (ref 70–99)
GLUCOSE BLD-MCNC: 83 MG/DL (ref 70–99)
GLUCOSE URINE: NEGATIVE MG/DL
HCO3 VENOUS: 21.3 MMOL/L (ref 23–29)
HCT VFR BLD CALC: 21.9 % (ref 36–48)
HCT VFR BLD CALC: 22.7 % (ref 36–48)
HCT VFR BLD CALC: 23.1 % (ref 36–48)
HCT VFR BLD CALC: 23.2 % (ref 36–48)
HCT VFR BLD CALC: 26 % (ref 36–48)
HEMOGLOBIN: 7 G/DL (ref 12–16)
HEMOGLOBIN: 7 G/DL (ref 12–16)
HEMOGLOBIN: 7.1 G/DL (ref 12–16)
HEMOGLOBIN: 7.2 G/DL (ref 12–16)
HEMOGLOBIN: 7.9 G/DL (ref 12–16)
INR BLD: 0.97 (ref 0.88–1.12)
IRON SATURATION: 6 % (ref 15–50)
IRON: 16 UG/DL (ref 37–145)
KAPPA, FREE LIGHT CHAINS, SERUM: 135.35 MG/L (ref 3.3–19.4)
KAPPA/LAMBDA RATIO: 5.51 (ref 0.26–1.65)
KAPPA/LAMBDA TEST COMMENT: ABNORMAL
KETONES, URINE: NEGATIVE MG/DL
LACTIC ACID, SEPSIS: 1.6 MMOL/L (ref 0.4–1.9)
LAMBDA, FREE LIGHT CHAINS, SERUM: 24.58 MG/L (ref 5.71–26.3)
LEUKOCYTE ESTERASE, URINE: NEGATIVE
LYMPHOCYTES ABSOLUTE: 0.8 K/UL (ref 1–5.1)
LYMPHOCYTES ABSOLUTE: 1 K/UL (ref 1–5.1)
LYMPHOCYTES RELATIVE PERCENT: 14.3 %
LYMPHOCYTES RELATIVE PERCENT: 14.7 %
M SPIKE 1 SERUM PROTEIN ELEC: 1.4 G/DL
MCH RBC QN AUTO: 25.7 PG (ref 26–34)
MCH RBC QN AUTO: 25.7 PG (ref 26–34)
MCH RBC QN AUTO: 26.2 PG (ref 26–34)
MCH RBC QN AUTO: 26.5 PG (ref 26–34)
MCH RBC QN AUTO: 26.7 PG (ref 26–34)
MCHC RBC AUTO-ENTMCNC: 30.3 G/DL (ref 31–36)
MCHC RBC AUTO-ENTMCNC: 30.4 G/DL (ref 31–36)
MCHC RBC AUTO-ENTMCNC: 30.9 G/DL (ref 31–36)
MCHC RBC AUTO-ENTMCNC: 31.8 G/DL (ref 31–36)
MCHC RBC AUTO-ENTMCNC: 32 G/DL (ref 31–36)
MCV RBC AUTO: 83.2 FL (ref 80–100)
MCV RBC AUTO: 83.4 FL (ref 80–100)
MCV RBC AUTO: 83.5 FL (ref 80–100)
MCV RBC AUTO: 85 FL (ref 80–100)
MCV RBC AUTO: 86.4 FL (ref 80–100)
METHEMOGLOBIN VENOUS: 0.4 %
MICROSCOPIC EXAMINATION: YES
MONOCYTES ABSOLUTE: 0.5 K/UL (ref 0–1.3)
MONOCYTES ABSOLUTE: 0.6 K/UL (ref 0–1.3)
MONOCYTES RELATIVE PERCENT: 8.6 %
MONOCYTES RELATIVE PERCENT: 9.6 %
NEUTROPHILS ABSOLUTE: 3.1 K/UL (ref 1.7–7.7)
NEUTROPHILS ABSOLUTE: 3.2 K/UL (ref 1.7–7.7)
NEUTROPHILS RELATIVE PERCENT: 47.1 %
NEUTROPHILS RELATIVE PERCENT: 56.7 %
NITRITE, URINE: POSITIVE
O2 CONTENT, VEN: 11 VOL %
O2 SAT, VEN: 100 %
O2 THERAPY: ABNORMAL
OCCULT BLOOD DIAGNOSTIC: NORMAL
PCO2, VEN: 38.8 MMHG (ref 40–50)
PDW BLD-RTO: 16.8 % (ref 12.4–15.4)
PDW BLD-RTO: 16.9 % (ref 12.4–15.4)
PDW BLD-RTO: 17 % (ref 12.4–15.4)
PDW BLD-RTO: 17.3 % (ref 12.4–15.4)
PDW BLD-RTO: 17.3 % (ref 12.4–15.4)
PH UA: 5.5 (ref 5–8)
PH VENOUS: 7.35 (ref 7.35–7.45)
PHOSPHORUS: 3.7 MG/DL (ref 2.5–4.9)
PHOSPHORUS: 4.4 MG/DL (ref 2.5–4.9)
PLATELET # BLD: 250 K/UL (ref 135–450)
PLATELET # BLD: 255 K/UL (ref 135–450)
PLATELET # BLD: 277 K/UL (ref 135–450)
PLATELET # BLD: 277 K/UL (ref 135–450)
PLATELET # BLD: 284 K/UL (ref 135–450)
PMV BLD AUTO: 9.1 FL (ref 5–10.5)
PMV BLD AUTO: 9.3 FL (ref 5–10.5)
PMV BLD AUTO: 9.4 FL (ref 5–10.5)
PMV BLD AUTO: 9.6 FL (ref 5–10.5)
PMV BLD AUTO: 9.7 FL (ref 5–10.5)
PO2, VEN: 155 MMHG (ref 25–40)
POTASSIUM SERPL-SCNC: 4.1 MMOL/L (ref 3.5–5.1)
POTASSIUM SERPL-SCNC: 4.2 MMOL/L (ref 3.5–5.1)
POTASSIUM SERPL-SCNC: 4.6 MMOL/L (ref 3.5–5.1)
POTASSIUM SERPL-SCNC: 4.7 MMOL/L (ref 3.5–5.1)
POTASSIUM SERPL-SCNC: 4.9 MMOL/L (ref 3.5–5.1)
POTASSIUM, UR: 40.3 MMOL/L
PRO-BNP: 4374 PG/ML (ref 0–449)
PRO-BNP: 4637 PG/ML (ref 0–449)
PROTEIN PROTEIN: 0.02 G/DL
PROTEIN PROTEIN: 23 MG/DL
PROTEIN UA: NEGATIVE MG/DL
PROTHROMBIN TIME: 10.9 SEC (ref 9.9–12.7)
RBC # BLD: 2.62 M/UL (ref 4–5.2)
RBC # BLD: 2.71 M/UL (ref 4–5.2)
RBC # BLD: 2.73 M/UL (ref 4–5.2)
RBC # BLD: 2.78 M/UL (ref 4–5.2)
RBC # BLD: 3.01 M/UL (ref 4–5.2)
RBC UA: ABNORMAL /HPF (ref 0–4)
REPORT: NORMAL
SODIUM BLD-SCNC: 134 MMOL/L (ref 136–145)
SODIUM BLD-SCNC: 135 MMOL/L (ref 136–145)
SODIUM BLD-SCNC: 137 MMOL/L (ref 136–145)
SODIUM BLD-SCNC: 137 MMOL/L (ref 136–145)
SODIUM BLD-SCNC: 138 MMOL/L (ref 136–145)
SODIUM URINE: 85 MMOL/L
SPE/IFE INTERPRETATION: NORMAL
SPECIFIC GRAVITY UA: 1.02 (ref 1–1.03)
TCO2 CALC VENOUS: 50 MMOL/L
TOTAL CK: 66 U/L (ref 26–192)
TOTAL IRON BINDING CAPACITY: 287 UG/DL (ref 260–445)
TOTAL PROTEIN: 7.2 G/DL (ref 6.4–8.2)
TOTAL PROTEIN: 7.6 G/DL (ref 6.4–8.2)
TOTAL PROTEIN: 7.9 G/DL (ref 6.4–8.2)
TROPONIN: 0.01 NG/ML
TROPONIN: 0.02 NG/ML
TROPONIN: 0.02 NG/ML
UREA NITROGEN, UR: 456 MG/DL (ref 800–1666)
URIC ACID, SERUM: 7.3 MG/DL (ref 2.6–6)
URINE ELECTROPHORESIS INTERP: NORMAL
URINE TYPE: ABNORMAL
UROBILINOGEN, URINE: 0.2 E.U./DL
VITAMIN B-12: 202 PG/ML (ref 211–911)
WBC # BLD: 3.5 K/UL (ref 4–11)
WBC # BLD: 5.7 K/UL (ref 4–11)
WBC # BLD: 6.6 K/UL (ref 4–11)
WBC # BLD: 6.7 K/UL (ref 4–11)
WBC # BLD: 9 K/UL (ref 4–11)
WBC UA: ABNORMAL /HPF (ref 0–5)

## 2021-01-01 PROCEDURE — 6370000000 HC RX 637 (ALT 250 FOR IP): Performed by: FAMILY MEDICINE

## 2021-01-01 PROCEDURE — 2580000003 HC RX 258: Performed by: NURSE PRACTITIONER

## 2021-01-01 PROCEDURE — 94761 N-INVAS EAR/PLS OXIMETRY MLT: CPT

## 2021-01-01 PROCEDURE — 87040 BLOOD CULTURE FOR BACTERIA: CPT

## 2021-01-01 PROCEDURE — 99283 EMERGENCY DEPT VISIT LOW MDM: CPT

## 2021-01-01 PROCEDURE — 2580000003 HC RX 258: Performed by: INTERNAL MEDICINE

## 2021-01-01 PROCEDURE — 6360000002 HC RX W HCPCS: Performed by: FAMILY MEDICINE

## 2021-01-01 PROCEDURE — 93005 ELECTROCARDIOGRAM TRACING: CPT | Performed by: EMERGENCY MEDICINE

## 2021-01-01 PROCEDURE — 83550 IRON BINDING TEST: CPT

## 2021-01-01 PROCEDURE — 93010 ELECTROCARDIOGRAM REPORT: CPT | Performed by: INTERNAL MEDICINE

## 2021-01-01 PROCEDURE — 83883 ASSAY NEPHELOMETRY NOT SPEC: CPT

## 2021-01-01 PROCEDURE — 82803 BLOOD GASES ANY COMBINATION: CPT

## 2021-01-01 PROCEDURE — 84300 ASSAY OF URINE SODIUM: CPT

## 2021-01-01 PROCEDURE — 82570 ASSAY OF URINE CREATININE: CPT

## 2021-01-01 PROCEDURE — 83605 ASSAY OF LACTIC ACID: CPT

## 2021-01-01 PROCEDURE — 2580000003 HC RX 258: Performed by: FAMILY MEDICINE

## 2021-01-01 PROCEDURE — 81001 URINALYSIS AUTO W/SCOPE: CPT

## 2021-01-01 PROCEDURE — 6370000000 HC RX 637 (ALT 250 FOR IP): Performed by: PHYSICIAN ASSISTANT

## 2021-01-01 PROCEDURE — 80069 RENAL FUNCTION PANEL: CPT

## 2021-01-01 PROCEDURE — 99285 EMERGENCY DEPT VISIT HI MDM: CPT

## 2021-01-01 PROCEDURE — 71045 X-RAY EXAM CHEST 1 VIEW: CPT

## 2021-01-01 PROCEDURE — 85027 COMPLETE CBC AUTOMATED: CPT

## 2021-01-01 PROCEDURE — 94640 AIRWAY INHALATION TREATMENT: CPT

## 2021-01-01 PROCEDURE — 84550 ASSAY OF BLOOD/URIC ACID: CPT

## 2021-01-01 PROCEDURE — 84156 ASSAY OF PROTEIN URINE: CPT

## 2021-01-01 PROCEDURE — 84484 ASSAY OF TROPONIN QUANT: CPT

## 2021-01-01 PROCEDURE — 2700000000 HC OXYGEN THERAPY PER DAY

## 2021-01-01 PROCEDURE — 1200000000 HC SEMI PRIVATE

## 2021-01-01 PROCEDURE — 83880 ASSAY OF NATRIURETIC PEPTIDE: CPT

## 2021-01-01 PROCEDURE — 87150 DNA/RNA AMPLIFIED PROBE: CPT

## 2021-01-01 PROCEDURE — 83540 ASSAY OF IRON: CPT

## 2021-01-01 PROCEDURE — 71250 CT THORAX DX C-: CPT

## 2021-01-01 PROCEDURE — 84540 ASSAY OF URINE/UREA-N: CPT

## 2021-01-01 PROCEDURE — 85610 PROTHROMBIN TIME: CPT

## 2021-01-01 PROCEDURE — 96374 THER/PROPH/DIAG INJ IV PUSH: CPT

## 2021-01-01 PROCEDURE — 84166 PROTEIN E-PHORESIS/URINE/CSF: CPT

## 2021-01-01 PROCEDURE — 85025 COMPLETE CBC W/AUTO DIFF WBC: CPT

## 2021-01-01 PROCEDURE — 93005 ELECTROCARDIOGRAM TRACING: CPT | Performed by: PHYSICIAN ASSISTANT

## 2021-01-01 PROCEDURE — 99223 1ST HOSP IP/OBS HIGH 75: CPT | Performed by: INTERNAL MEDICINE

## 2021-01-01 PROCEDURE — 36415 COLL VENOUS BLD VENIPUNCTURE: CPT

## 2021-01-01 PROCEDURE — 82436 ASSAY OF URINE CHLORIDE: CPT

## 2021-01-01 PROCEDURE — 6370000000 HC RX 637 (ALT 250 FOR IP)

## 2021-01-01 PROCEDURE — 6370000000 HC RX 637 (ALT 250 FOR IP): Performed by: NURSE PRACTITIONER

## 2021-01-01 PROCEDURE — 6360000002 HC RX W HCPCS: Performed by: NURSE PRACTITIONER

## 2021-01-01 PROCEDURE — 80053 COMPREHEN METABOLIC PANEL: CPT

## 2021-01-01 PROCEDURE — 82607 VITAMIN B-12: CPT

## 2021-01-01 PROCEDURE — 84165 PROTEIN E-PHORESIS SERUM: CPT

## 2021-01-01 PROCEDURE — 84133 ASSAY OF URINE POTASSIUM: CPT

## 2021-01-01 PROCEDURE — 80048 BASIC METABOLIC PNL TOTAL CA: CPT

## 2021-01-01 PROCEDURE — 82728 ASSAY OF FERRITIN: CPT

## 2021-01-01 PROCEDURE — 99233 SBSQ HOSP IP/OBS HIGH 50: CPT | Performed by: INTERNAL MEDICINE

## 2021-01-01 PROCEDURE — 82746 ASSAY OF FOLIC ACID SERUM: CPT

## 2021-01-01 PROCEDURE — 6360000002 HC RX W HCPCS: Performed by: PHYSICIAN ASSISTANT

## 2021-01-01 PROCEDURE — 85730 THROMBOPLASTIN TIME PARTIAL: CPT

## 2021-01-01 PROCEDURE — 86335 IMMUNFIX E-PHORSIS/URINE/CSF: CPT

## 2021-01-01 PROCEDURE — 84155 ASSAY OF PROTEIN SERUM: CPT

## 2021-01-01 PROCEDURE — 82550 ASSAY OF CK (CPK): CPT

## 2021-01-01 PROCEDURE — 6370000000 HC RX 637 (ALT 250 FOR IP): Performed by: INTERNAL MEDICINE

## 2021-01-01 PROCEDURE — G0328 FECAL BLOOD SCRN IMMUNOASSAY: HCPCS

## 2021-01-01 RX ORDER — ACETAMINOPHEN 650 MG/1
650 SUPPOSITORY RECTAL EVERY 6 HOURS PRN
Status: DISCONTINUED | OUTPATIENT
Start: 2021-01-01 | End: 2021-01-01 | Stop reason: HOSPADM

## 2021-01-01 RX ORDER — HYDRALAZINE HYDROCHLORIDE 20 MG/ML
10 INJECTION INTRAMUSCULAR; INTRAVENOUS EVERY 4 HOURS PRN
Status: DISCONTINUED | OUTPATIENT
Start: 2021-01-01 | End: 2021-01-01 | Stop reason: HOSPADM

## 2021-01-01 RX ORDER — FERROUS SULFATE 325(65) MG
325 TABLET ORAL 2 TIMES DAILY WITH MEALS
Qty: 30 TABLET | Refills: 3 | Status: SHIPPED | OUTPATIENT
Start: 2021-01-01

## 2021-01-01 RX ORDER — SODIUM CHLORIDE 9 MG/ML
25 INJECTION, SOLUTION INTRAVENOUS PRN
Status: DISCONTINUED | OUTPATIENT
Start: 2021-01-01 | End: 2021-01-01 | Stop reason: HOSPADM

## 2021-01-01 RX ORDER — ONDANSETRON 2 MG/ML
4 INJECTION INTRAMUSCULAR; INTRAVENOUS EVERY 6 HOURS PRN
Status: DISCONTINUED | OUTPATIENT
Start: 2021-01-01 | End: 2021-01-01 | Stop reason: HOSPADM

## 2021-01-01 RX ORDER — IPRATROPIUM BROMIDE AND ALBUTEROL SULFATE 2.5; .5 MG/3ML; MG/3ML
1 SOLUTION RESPIRATORY (INHALATION)
Status: DISCONTINUED | OUTPATIENT
Start: 2021-01-01 | End: 2021-01-01 | Stop reason: HOSPADM

## 2021-01-01 RX ORDER — METHYLPREDNISOLONE SODIUM SUCCINATE 125 MG/2ML
125 INJECTION, POWDER, LYOPHILIZED, FOR SOLUTION INTRAMUSCULAR; INTRAVENOUS ONCE
Status: COMPLETED | OUTPATIENT
Start: 2021-01-01 | End: 2021-01-01

## 2021-01-01 RX ORDER — IPRATROPIUM BROMIDE AND ALBUTEROL SULFATE 2.5; .5 MG/3ML; MG/3ML
1 SOLUTION RESPIRATORY (INHALATION) EVERY 4 HOURS PRN
Qty: 360 ML | Refills: 5 | Status: SHIPPED | OUTPATIENT
Start: 2021-01-01

## 2021-01-01 RX ORDER — SODIUM CHLORIDE 0.9 % (FLUSH) 0.9 %
5-40 SYRINGE (ML) INJECTION PRN
Status: DISCONTINUED | OUTPATIENT
Start: 2021-01-01 | End: 2021-01-01 | Stop reason: HOSPADM

## 2021-01-01 RX ORDER — SODIUM CHLORIDE 9 MG/ML
INJECTION, SOLUTION INTRAVENOUS CONTINUOUS
Status: DISCONTINUED | OUTPATIENT
Start: 2021-01-01 | End: 2021-01-01

## 2021-01-01 RX ORDER — IPRATROPIUM BROMIDE AND ALBUTEROL SULFATE 2.5; .5 MG/3ML; MG/3ML
1 SOLUTION RESPIRATORY (INHALATION) ONCE
Status: COMPLETED | OUTPATIENT
Start: 2021-01-01 | End: 2021-01-01

## 2021-01-01 RX ORDER — METHYLPREDNISOLONE SODIUM SUCCINATE 40 MG/ML
40 INJECTION, POWDER, LYOPHILIZED, FOR SOLUTION INTRAMUSCULAR; INTRAVENOUS EVERY 12 HOURS
Status: DISCONTINUED | OUTPATIENT
Start: 2021-01-01 | End: 2021-01-01

## 2021-01-01 RX ORDER — ONDANSETRON 4 MG/1
4 TABLET, ORALLY DISINTEGRATING ORAL EVERY 8 HOURS PRN
Status: DISCONTINUED | OUTPATIENT
Start: 2021-01-01 | End: 2021-01-01 | Stop reason: HOSPADM

## 2021-01-01 RX ORDER — POLYETHYLENE GLYCOL 3350 17 G/17G
17 POWDER, FOR SOLUTION ORAL DAILY PRN
Status: DISCONTINUED | OUTPATIENT
Start: 2021-01-01 | End: 2021-01-01 | Stop reason: HOSPADM

## 2021-01-01 RX ORDER — IPRATROPIUM BROMIDE AND ALBUTEROL SULFATE 2.5; .5 MG/3ML; MG/3ML
SOLUTION RESPIRATORY (INHALATION)
Status: COMPLETED
Start: 2021-01-01 | End: 2021-01-01

## 2021-01-01 RX ORDER — ACETAMINOPHEN 325 MG/1
650 TABLET ORAL EVERY 6 HOURS PRN
Status: DISCONTINUED | OUTPATIENT
Start: 2021-01-01 | End: 2021-01-01 | Stop reason: HOSPADM

## 2021-01-01 RX ORDER — FERROUS SULFATE 325(65) MG
325 TABLET ORAL 2 TIMES DAILY WITH MEALS
Status: DISCONTINUED | OUTPATIENT
Start: 2021-01-01 | End: 2021-01-01 | Stop reason: HOSPADM

## 2021-01-01 RX ORDER — PREDNISONE 20 MG/1
40 TABLET ORAL DAILY
Qty: 10 TABLET | Refills: 0 | Status: SHIPPED | OUTPATIENT
Start: 2021-01-01 | End: 2021-07-29

## 2021-01-01 RX ORDER — HYDRALAZINE HYDROCHLORIDE 25 MG/1
25 TABLET, FILM COATED ORAL EVERY 8 HOURS SCHEDULED
Status: DISCONTINUED | OUTPATIENT
Start: 2021-01-01 | End: 2021-01-01 | Stop reason: HOSPADM

## 2021-01-01 RX ORDER — BUDESONIDE AND FORMOTEROL FUMARATE DIHYDRATE 160; 4.5 UG/1; UG/1
2 AEROSOL RESPIRATORY (INHALATION) 2 TIMES DAILY
Status: DISCONTINUED | OUTPATIENT
Start: 2021-01-01 | End: 2021-01-01

## 2021-01-01 RX ORDER — LISINOPRIL 10 MG/1
5 TABLET ORAL DAILY
Status: DISCONTINUED | OUTPATIENT
Start: 2021-01-01 | End: 2021-01-01

## 2021-01-01 RX ORDER — HYDRALAZINE HYDROCHLORIDE 25 MG/1
25 TABLET, FILM COATED ORAL EVERY 8 HOURS SCHEDULED
Qty: 90 TABLET | Refills: 3 | Status: SHIPPED | OUTPATIENT
Start: 2021-01-01

## 2021-01-01 RX ORDER — SODIUM CHLORIDE 0.9 % (FLUSH) 0.9 %
5-40 SYRINGE (ML) INJECTION EVERY 12 HOURS SCHEDULED
Status: DISCONTINUED | OUTPATIENT
Start: 2021-01-01 | End: 2021-01-01 | Stop reason: HOSPADM

## 2021-01-01 RX ORDER — ALBUTEROL SULFATE 2.5 MG/3ML
2.5 SOLUTION RESPIRATORY (INHALATION) EVERY 6 HOURS PRN
Status: DISCONTINUED | OUTPATIENT
Start: 2021-01-01 | End: 2021-01-01 | Stop reason: HOSPADM

## 2021-01-01 RX ADMIN — IPRATROPIUM BROMIDE AND ALBUTEROL SULFATE 1 AMPULE: .5; 3 SOLUTION RESPIRATORY (INHALATION) at 12:06

## 2021-01-01 RX ADMIN — IPRATROPIUM BROMIDE AND ALBUTEROL SULFATE 1 AMPULE: .5; 3 SOLUTION RESPIRATORY (INHALATION) at 14:44

## 2021-01-01 RX ADMIN — Medication 10 ML: at 09:00

## 2021-01-01 RX ADMIN — IPRATROPIUM BROMIDE AND ALBUTEROL SULFATE 1 AMPULE: .5; 3 SOLUTION RESPIRATORY (INHALATION) at 15:47

## 2021-01-01 RX ADMIN — HYDRALAZINE HYDROCHLORIDE 25 MG: 25 TABLET, FILM COATED ORAL at 21:31

## 2021-01-01 RX ADMIN — IPRATROPIUM BROMIDE AND ALBUTEROL SULFATE 1 AMPULE: .5; 3 SOLUTION RESPIRATORY (INHALATION) at 19:50

## 2021-01-01 RX ADMIN — Medication 10 ML: at 21:31

## 2021-01-01 RX ADMIN — ACETAMINOPHEN 650 MG: 325 TABLET ORAL at 17:36

## 2021-01-01 RX ADMIN — IPRATROPIUM BROMIDE AND ALBUTEROL SULFATE 1 AMPULE: .5; 3 SOLUTION RESPIRATORY (INHALATION) at 10:07

## 2021-01-01 RX ADMIN — SODIUM CHLORIDE: 9 INJECTION, SOLUTION INTRAVENOUS at 19:30

## 2021-01-01 RX ADMIN — HYDRALAZINE HYDROCHLORIDE 25 MG: 25 TABLET, FILM COATED ORAL at 05:59

## 2021-01-01 RX ADMIN — IPRATROPIUM BROMIDE AND ALBUTEROL SULFATE 1 AMPULE: .5; 3 SOLUTION RESPIRATORY (INHALATION) at 13:38

## 2021-01-01 RX ADMIN — ACETAMINOPHEN 650 MG: 325 TABLET ORAL at 05:50

## 2021-01-01 RX ADMIN — METHYLPREDNISOLONE SODIUM SUCCINATE 125 MG: 125 INJECTION, POWDER, FOR SOLUTION INTRAMUSCULAR; INTRAVENOUS at 17:46

## 2021-01-01 RX ADMIN — ENOXAPARIN SODIUM 30 MG: 30 INJECTION SUBCUTANEOUS at 09:11

## 2021-01-01 RX ADMIN — IPRATROPIUM BROMIDE AND ALBUTEROL SULFATE 1 AMPULE: .5; 3 SOLUTION RESPIRATORY (INHALATION) at 15:51

## 2021-01-01 RX ADMIN — HYDRALAZINE HYDROCHLORIDE 25 MG: 25 TABLET, FILM COATED ORAL at 14:00

## 2021-01-01 RX ADMIN — Medication 10 ML: at 22:44

## 2021-01-01 RX ADMIN — HYDRALAZINE HYDROCHLORIDE 25 MG: 25 TABLET, FILM COATED ORAL at 05:50

## 2021-01-01 RX ADMIN — IRON SUCROSE 200 MG: 20 INJECTION, SOLUTION INTRAVENOUS at 14:40

## 2021-01-01 RX ADMIN — HYDRALAZINE HYDROCHLORIDE 25 MG: 25 TABLET, FILM COATED ORAL at 05:49

## 2021-01-01 RX ADMIN — ENOXAPARIN SODIUM 30 MG: 30 INJECTION SUBCUTANEOUS at 09:20

## 2021-01-01 RX ADMIN — FERROUS SULFATE TAB 325 MG (65 MG ELEMENTAL FE) 325 MG: 325 (65 FE) TAB at 10:53

## 2021-01-01 RX ADMIN — METHYLPREDNISOLONE SODIUM SUCCINATE 40 MG: 40 INJECTION, POWDER, FOR SOLUTION INTRAMUSCULAR; INTRAVENOUS at 18:00

## 2021-01-01 RX ADMIN — GLYCOPYRROLATE AND FORMOTEROL FUMARATE 2 PUFF: 9; 4.8 AEROSOL, METERED RESPIRATORY (INHALATION) at 10:07

## 2021-01-01 RX ADMIN — IPRATROPIUM BROMIDE AND ALBUTEROL SULFATE 1 AMPULE: .5; 3 SOLUTION RESPIRATORY (INHALATION) at 09:40

## 2021-01-01 RX ADMIN — METHYLPREDNISOLONE SODIUM SUCCINATE 40 MG: 40 INJECTION, POWDER, FOR SOLUTION INTRAMUSCULAR; INTRAVENOUS at 05:49

## 2021-01-01 RX ADMIN — GLYCOPYRROLATE AND FORMOTEROL FUMARATE 2 PUFF: 9; 4.8 AEROSOL, METERED RESPIRATORY (INHALATION) at 19:50

## 2021-01-01 RX ADMIN — Medication 2 PUFF: at 07:49

## 2021-01-01 RX ADMIN — HYDRALAZINE HYDROCHLORIDE 25 MG: 25 TABLET, FILM COATED ORAL at 14:45

## 2021-01-01 RX ADMIN — METHYLPREDNISOLONE SODIUM SUCCINATE 125 MG: 125 INJECTION, POWDER, FOR SOLUTION INTRAMUSCULAR; INTRAVENOUS at 14:41

## 2021-01-01 RX ADMIN — ACETAMINOPHEN 650 MG: 325 TABLET ORAL at 23:24

## 2021-01-01 RX ADMIN — HYDRALAZINE HYDROCHLORIDE 25 MG: 25 TABLET, FILM COATED ORAL at 22:39

## 2021-01-01 RX ADMIN — HYDRALAZINE HYDROCHLORIDE 25 MG: 25 TABLET, FILM COATED ORAL at 22:44

## 2021-01-01 RX ADMIN — IPRATROPIUM BROMIDE AND ALBUTEROL SULFATE 3 ML: .5; 3 SOLUTION RESPIRATORY (INHALATION) at 07:49

## 2021-01-01 RX ADMIN — IPRATROPIUM BROMIDE AND ALBUTEROL SULFATE 1 AMPULE: .5; 3 SOLUTION RESPIRATORY (INHALATION) at 07:51

## 2021-01-01 ASSESSMENT — ENCOUNTER SYMPTOMS
ABDOMINAL PAIN: 0
WHEEZING: 0
DIARRHEA: 0
DIARRHEA: 0
VOMITING: 0
RHINORRHEA: 0
WHEEZING: 0
NAUSEA: 0
COUGH: 0
SHORTNESS OF BREATH: 1
RHINORRHEA: 0
ABDOMINAL PAIN: 0
VOMITING: 0
SHORTNESS OF BREATH: 1
NAUSEA: 0
COUGH: 0

## 2021-01-01 ASSESSMENT — PAIN SCALES - GENERAL
PAINLEVEL_OUTOF10: 3
PAINLEVEL_OUTOF10: 0
PAINLEVEL_OUTOF10: 0
PAINLEVEL_OUTOF10: 7
PAINLEVEL_OUTOF10: 0
PAINLEVEL_OUTOF10: 7
PAINLEVEL_OUTOF10: 0
PAINLEVEL_OUTOF10: 7
PAINLEVEL_OUTOF10: 0

## 2021-01-01 ASSESSMENT — PAIN DESCRIPTION - DESCRIPTORS: DESCRIPTORS: ACHING

## 2021-01-01 ASSESSMENT — PAIN DESCRIPTION - LOCATION
LOCATION: SHOULDER;NECK
LOCATION: NECK
LOCATION: SHOULDER

## 2021-01-01 ASSESSMENT — PAIN DESCRIPTION - PAIN TYPE
TYPE: ACUTE PAIN

## 2021-01-01 ASSESSMENT — PAIN DESCRIPTION - ORIENTATION: ORIENTATION: POSTERIOR

## 2021-07-19 PROBLEM — J44.1 COPD EXACERBATION (HCC): Status: ACTIVE | Noted: 2021-01-01

## 2021-07-19 NOTE — ED PROVIDER NOTES
[facility]        Pt Name: Valerie Sher  MRN: [id number]  [place] 1945  Date of evaluation: 7/19/2021  Provider: Kendy Shay PA-C  PCP: No primary care provider on file. Note Started: 3:07 PM EDT       SHANTANU. I have evaluated this patient. My supervising physician was available for consultation. CHIEF COMPLAINT       Chief Complaint   Patient presents with    Shortness of Breath     pt arrived via ems with shortness of breath and productive cough. Has hx of emphysema. Pt has several bedbugs on clothes and body. Several bites noted on body. HISTORY OF PRESENT ILLNESS   (Location, Timing/Onset, Context/Setting, Quality, Duration, Modifying Factors, Severity, Associated Signs and Symptoms)  Note limiting factors. Chief Complaint: RON Sher is a 64 y.o. female who presents for evaluation of increasing shortness of breath that started around noon. She has no prior oxygen requirement. Does report history of COPD. She denies any cough, despite triage note. No congestion. She has not had any recent steroid use. She denies any recent hospitalizations for COPD exacerbations. She denies any leg pain or swelling. No fevers or chills. No abdominal pain nausea vomiting or diarrhea. No chest pain. She has no other complaints or concerns at this time. Nursing Notes were all reviewed and agreed with or any disagreements were addressed in the HPI. REVIEW OF SYSTEMS    (2-9 systems for level 4, 10 or more for level 5)     Review of Systems   Constitutional: Negative for appetite change, chills and fever. HENT: Negative for congestion and rhinorrhea. Respiratory: Positive for shortness of breath. Negative for cough and wheezing. Cardiovascular: Negative for chest pain. Gastrointestinal: Negative for abdominal pain, diarrhea, nausea and vomiting.    Genitourinary: Negative for difficulty urinating, dysuria and hematuria. Musculoskeletal: Negative for neck pain and neck stiffness. Skin: Negative for rash. Neurological: Negative for headaches. Positives and Pertinent negatives as per HPI. Except as noted above in the ROS, all other systems were reviewed and negative.        PAST MEDICAL HISTORY     Past Medical History:   Diagnosis Date    Arthritis     Asthma     [facility]     Cancer ([facility]     bladder    [facility]     Depression     controlled w/meds    Diabetes mellitus [facility]     diet control    Hypertension     Neuropathy     toes    Obesity, unspecified     Osteoarthrosis, unspecified whether generalized or localized, lower leg     PVD [facility]peripheral vascular disease) [facility]     Tobacco use disorder     Unspecified cataract          SURGICAL HISTORY     Past Surgical History:   Procedure Laterality Date    ABDOMEN SURGERY      BLADDER REMOVAL      partial    CHOLECYSTECTOMY      COLONOSCOPY      CYSTOSCOPY  4-7-10    fulgeration of bladder tumor    CYSTOSCOPY  7/14/10    Cytology    CYSTOSCOPY  12/15/10    CYSTOSCOPY  3/2/11    COLLAGEN INJECTION    CYSTOSCOPY  6-    cytology    CYSTOSCOPY  10/19/11    CYSTOSCOPY  4/11/12    CYSTOSCOPY  10/8/12    cytology    CYSTOSCOPY  4/7/14    cysto/cytology    CYSTOSCOPY  04/29/2015    Cytology study    CYSTOSCOPY Right 1/16/2020    CYSTOSCOPY RIGHT URETERAL STENT REMOVAL, RIGHT RETROGRADE PYELOGRAM performed by Jessica Garcia MD at [address] [place] [address]      cataract left eye    FEMUR FRACTURE SURGERY Left [id number]    GASTRIC BYPASS SURGERY      JOINT REPLACEMENT      left knee    OTHER SURGICAL HISTORY Left 10/24/2016    left nephroureterectomy    OTHER SURGICAL HISTORY  03/22/2017    RIGHT ureteral stent placement, RIGHT nephrostomy drain placement     TOTAL KNEE ARTHROPLASTY Left          CURRENTMEDICATIONS       Previous Medications ACETAMINOPHEN (TYLENOL) 500 MG TABLET    Take 500 mg by mouth every 6 hours as needed for Pain. ALBUTEROL SULFATE  (90 BASE) MCG/ACT INHALER    Use 2 puffs 4 times daily for 7 days then as needed for wheezing. Dispense with Spacer and instruct in use. At patient's preference may use 60 dose MDI. May Sub Pro-Air or Proventil as needed per insurance. CYANOCOBALAMIN (VITAMIN B-12) 1000 MCG EXTENDED RELEASE TABLET    Take 1 tablet by mouth daily    IPRATROPIUM-ALBUTEROL (DUONEB) 0.5-2.5 (3) MG/3ML SOLN NEBULIZER SOLUTION    Inhale 3 mLs into the lungs every 4 hours as needed for Shortness of Breath         ALLERGIES     Aspirin, Ibuprofen, Adhesive tape, Hydrochlorothiazide, Microderm base [albolene], Nsaids, Voltaren [diclofenac sodium], and Procardia [nifedipine]    FAMILYHISTORY       Family History   Problem Relation Age of Onset    Heart Disease Brother     Anesth Problems Mother     Heart Disease Mother     Stroke Brother     Heart Disease Father           SOCIAL HISTORY       Social History     Tobacco Use    Smoking status: Former Smoker     Packs/day: 1.00     Years: 55.00     Pack years: 55.00     Types: Cigarettes    Smokeless tobacco: Never Used    Tobacco comment: QUIT 6 MONTHS AGO   Vaping Use    Vaping Use: Never used   Substance Use Topics    Alcohol use: Not Currently     Alcohol/week: 0.0 standard drinks    Drug use: No       SCREENINGS             PHYSICAL EXAM    (up to 7 for level 4, 8 or more for level 5)     ED Triage Vitals [07/19/21 1457]   BP Temp Temp Source Pulse Resp SpO2 Height Weight   (!) 154/53 98.5 °F (36.9 °C) Oral 70 18 100 % 5' 2\" (1.575 m) 135 lb (61.2 kg)       Physical Exam  Vitals and nursing note reviewed. Constitutional:       General: She is not in acute distress. Appearance: She is well-developed. She is not ill-appearing, toxic-appearing or diaphoretic. HENT:      Head: Normocephalic and atraumatic.       Right Ear: External ear normal. Left Ear: External ear normal.      Nose: Nose normal.      Mouth/Throat:      Mouth: Mucous membranes are moist.      Pharynx: Oropharynx is clear. No oropharyngeal exudate or posterior oropharyngeal erythema. Eyes:      General:         Right eye: No discharge. Left eye: No discharge. Cardiovascular:      Rate and Rhythm: Normal rate and regular rhythm. Heart sounds: Normal heart sounds. Pulmonary:      Effort: Pulmonary effort is normal. No respiratory distress. Breath sounds: Wheezing present. Abdominal:      General: There is no distension. Palpations: Abdomen is soft. Tenderness: There is no abdominal tenderness. Musculoskeletal:         General: Normal range of motion. Cervical back: Normal range of motion and neck supple. Skin:     General: Skin is warm and dry. Neurological:      Mental Status: She is alert and oriented to person, place, and time.    Psychiatric:         Behavior: Behavior normal.         DIAGNOSTIC RESULTS   LABS:    Labs Reviewed   CBC WITH AUTO DIFFERENTIAL - Abnormal; Notable for the following components:       Result Value    RBC 3.01 (*)     Hemoglobin 7.9 (*)     Hematocrit 26.0 (*)     MCHC 30.4 (*)     RDW 17.3 (*)     Eosinophils Absolute 2.0 (*)     All other components within normal limits    Narrative:     Performed at:  [facility] [address], [place], [facility]   Phone [phone]   COMPREHENSIVE METABOLIC PANEL - Abnormal; Notable for the following components:    Sodium 135 (*)     BUN 29 (*)     CREATININE 1.9 (*)     GFR Non- 26 (*)     GFR  31 (*)     Albumin 3.0 (*)     Albumin/Globulin Ratio 0.6 (*)     Alkaline Phosphatase 179 (*)     ALT 9 (*)     All other components within normal limits    Narrative:     Performed at:  [facility]  [address] [facility], [facility]   Phone [phone] PEPTIDE - Abnormal; Notable for the following components:    Pro-BNP 4,637 (*)     All other components within normal limits    Narrative:     Performed at:  [facility]  [address] [facility], [address]   Phone [phone]   TROPONIN    Narrative:     Performed at:  [facility]  [address] [facility], [address]   Phone [phone]       When ordered only abnormal lab results are displayed. All other labs were within normal range or not returned as of this dictation. EKG: When ordered, EKG's are interpreted by the Emergency Department Physician in the absence of a cardiologist.  Please see their note for interpretation of EKG. RADIOLOGY:   Non-plain film images such as CT, Ultrasound and MRI are read by the radiologist. Plain radiographic images are visualized and preliminarily interpreted by the ED Provider with the below findings:        Interpretation per the Radiologist below, if available at the time of this note:    XR CHEST PORTABLE   Final Result   No radiographic evidence of acute pulmonary disease. No results found. PROCEDURES   Unless otherwise noted below, none     Procedures    CRITICAL CARE TIME   N/A    CONSULTS:  None      EMERGENCY DEPARTMENT COURSE and DIFFERENTIAL DIAGNOSIS/MDM:   Vitals:    Vitals:    07/19/21 1530 07/19/21 1551 07/19/21 1600 07/19/21 1630   BP: (!) 153/61  (!) 162/65 (!) 144/48   Pulse: 77  86 80   Resp: 25 18 13 19   Temp:       TempSrc:       SpO2: 100% 100% (!) 77% 93%   Weight:       Height:           Patient was given the following medications:  Medications   methylPREDNISolone sodium (SOLU-MEDROL) injection 125 mg (has no administration in time range)   ipratropium-albuterol (DUONEB) nebulizer solution 1 ampule (1 ampule Inhalation Given 7/19/21 1551)           Patient presents for evaluation of shortness of breath that started today.   On exam, she appears slightly short of breath, slightly tachypneic with audible wheezing. Vitals otherwise stable and she is afebrile. No obvious rales or rhonchi. Chest is nontender and abdomen is benign. She was given DuoNeb breathing treatment and IV Solu-Medrol for symptomatic relief and will be reevaluated. Please see attending note for EKG interpretation. CBC and CMP are unremarkable. She has chronic kidney disease with a creatinine of 1.9. Troponin is negative. BNP elevated at 4637, however, there is no radiographic evidence of acute cardiopulmonary disease. Regardless, patient will be admitted for further evaluation management of suspected COPD exacerbation as source of hypoxia with increasing oxygen requirement. Hospitalist resume care the patient at this time. Patient was informed and agreeable. She is stable for admission. Critical Care  There was a high probability of life-threatening clinical deterioration in the patient's condition requiring my urgent intervention. Total critical care time with the patient was 31 minutes excluding separately reportable procedures. Critical care required due to patients presentation, comorbidities and concern for acute life-threatening disease process, pneumonia, CHF, COPD exacerbation and respiratory failure with hypoxia and new oxygen requirement. FINAL IMPRESSION      1. COPD exacerbation ([facility]          DISPOSITION/PLAN   DISPOSITION Decision To Admit 07/19/2021 05:12:32 PM      PATIENT REFERRED TO:  No follow-up provider specified.     DISCHARGE MEDICATIONS:  New Prescriptions    No medications on file       DISCONTINUED MEDICATIONS:  Discontinued Medications    No medications on file              (Please note that portions of this note were completed with a voice recognition program.  Efforts were made to edit the dictations but occasionally words are mis-transcribed.)    Orville Hyde PA-C (electronically signed)           Jeremiah Cha PA-C  07/19/21 1044 [address],[place] [address]

## 2021-07-19 NOTE — ED PROVIDER NOTES
EKG:  Read by me in the absence of a cardiologist shows:  Sinus rhythm, normal rate, normal conduction intervals, normal axis, no acute injury pattern, no major change from prior study      I was available for consultation. I did not perform face-to-face evaluation. Please see DAMEON Lares note for further information on this visit.          Iliana Musa MD  07/19/21 3228

## 2021-07-20 NOTE — PROGRESS NOTES
4 Eyes Skin Assessment     The patient is being assess for  Admission    I agree that 2 RN's have performed a thorough Head to Toe Skin Assessment on the patient. ALL assessment sites listed below have been assessed. Areas assessed by both nurses:   [x]   Head, Face, and Ears   [x]   Shoulders, Back, and Chest  [x]   Arms, Elbows, and Hands   [x]   Coccyx, Sacrum, and IschIum  [x]   Legs, Feet, and Heels        Does the Patient have Skin Breakdown?   No         Jesse Prevention initiated:  Yes   Wound Care Orders initiated:  No      [facility] nurse consulted for Pressure Injury (Stage 3,4, Unstageable, DTI, NWPT, and Complex wounds), New and Established Ostomies:  NA      Nurse 1 eSignature: Electronically signed by Haim Harrell RN on 7/19/21 at 8:37 PM EDT    **SHARE this note so that the co-signing nurse is able to place an eSignature**    Nurse 2 eSignature: Electronically signed by Placido Bauer RN on 7/19/21 at 11:23 PM EDT

## 2021-07-20 NOTE — CONSULTS
[facility]   Consult Note      Reason for Consult: COPD exacerbation  Requesting Physician: Dr. Chun Matt:   [address] [facility]":                The patient is a 64 y.o. female with significant past medical history of:      Diagnosis Date    Arthritis     Asthma     [facility]     Cancer ([facility]     bladder    COPD (chronic obstructive pulmonary disease) ([facility]     Depression     controlled w/meds    Diabetes mellitus ([facility]     diet control    Hypertension     Neuropathy     toes    Obesity, unspecified     Osteoarthrosis, unspecified whether generalized or localized, lower leg     PVD (peripheral vascular disease) ([facility]     Tobacco use disorder     Unspecified cataract      Patient presented to the hospital with chief complaint of shortness of breath. She has also associated cough with minimal sputum. There is no complaint of chest pain. She admits to baseline dyspnea that has been active for years. She has a significant tobacco exposure history and quit smoking about 1 year ago. She has had multiple ER and hospital admissions related to COPD. She has never seen pulmonology. She treats normally with albuterol HFA/HHN at home. She is not using any supplemental oxygen. She is never had a PFT or CT chest.  She admits to baseline dyspnea including shortness of breath with minimal activity such as walking from chair to bathroom. She has difficulty accomplishing daily activities without dyspnea.       Past Surgical History:        Procedure Laterality Date    ABDOMEN SURGERY      BLADDER REMOVAL      partial    CHOLECYSTECTOMY      COLONOSCOPY      CYSTOSCOPY  4-7-10    fulgeration of bladder tumor    CYSTOSCOPY  7/14/10    Cytology    CYSTOSCOPY  12/15/10    CYSTOSCOPY  3/2/11    COLLAGEN INJECTION    CYSTOSCOPY  6-    cytology    CYSTOSCOPY  10/19/11    CYSTOSCOPY  4/11/12    CYSTOSCOPY  10/8/12    cytology    CYSTOSCOPY  4/7/14 cysto/cytology    CYSTOSCOPY  04/29/2015    Cytology study    CYSTOSCOPY Right 1/16/2020    CYSTOSCOPY RIGHT URETERAL STENT REMOVAL, RIGHT RETROGRADE PYELOGRAM performed by Lester Figueroa MD at [address] [place] [address]      cataract left eye    FEMUR FRACTURE SURGERY Left [id number]    GASTRIC BYPASS SURGERY      JOINT REPLACEMENT      left knee    OTHER SURGICAL HISTORY Left 10/24/2016    left nephroureterectomy    OTHER SURGICAL HISTORY  03/22/2017    RIGHT ureteral stent placement, RIGHT nephrostomy drain placement     TOTAL KNEE ARTHROPLASTY Left      Current Medications:    Current Facility-Administered Medications: ipratropium-albuterol (DUONEB) nebulizer solution 1 ampule, 1 ampule, Inhalation, Q4H WA  budesonide-formoterol (SYMBICORT) 160-4.5 MCG/ACT inhaler 2 puff, 2 puff, Inhalation, BID  methylPREDNISolone sodium (SOLU-MEDROL) injection 40 mg, 40 mg, Intravenous, Q12H  sodium chloride flush 0.9 % injection 5-40 mL, 5-40 mL, Intravenous, 2 times per day  sodium chloride flush 0.9 % injection 5-40 mL, 5-40 mL, Intravenous, PRN  0.9 % sodium chloride infusion, 25 mL, Intravenous, PRN  enoxaparin (LOVENOX) injection 30 mg, 30 mg, Subcutaneous, Daily  ondansetron (ZOFRAN-ODT) disintegrating tablet 4 mg, 4 mg, Oral, Q8H PRN **OR** ondansetron (ZOFRAN) injection 4 mg, 4 mg, Intravenous, Q6H PRN  polyethylene glycol (GLYCOLAX) packet 17 g, 17 g, Oral, Daily PRN  acetaminophen (TYLENOL) tablet 650 mg, 650 mg, Oral, Q6H PRN **OR** acetaminophen (TYLENOL) suppository 650 mg, 650 mg, Rectal, Q6H PRN  hydrALAZINE (APRESOLINE) injection 10 mg, 10 mg, Intravenous, Q4H PRN  hydrALAZINE (APRESOLINE) tablet 25 mg, 25 mg, Oral, 3 times per day    Allergies   Allergen Reactions    Aspirin Shortness Of Breath    Ibuprofen Shortness Of Breath    Adhesive Tape Swelling     Only if it stays on a long time    Hydrochlorothiazide Other (See Comments)     Palpitations.      Microderm Base [Albolene]     Nsaids Other (See Comments)     Respiratory failure.  Voltaren [Diclofenac Sodium]      Wheezing, swelling.  Procardia [Nifedipine] Swelling and Rash       Social History:    TOBACCO:   reports that she has quit smoking. Her smoking use included cigarettes. She has a 55.00 pack-year smoking history. She has never used smokeless tobacco.  ETOH:   reports previous alcohol use. Patient currently lives independently  Environmental/chemical exposure: None known    Family History:       Problem Relation Age of Onset    Heart Disease Brother     Anesth Problems Mother     Heart Disease Mother     Stroke Brother     Heart Disease Father      REVIEW OF SYSTEMS:    CONSTITUTIONAL:  negative for fevers, chills, diaphoresis, activity change, appetite change, fatigue, night sweats and unexpected weight change. EYES:  negative for blurred vision, eye discharge, visual disturbance and icterus  HEENT:  negative for hearing loss, tinnitus, ear drainage, sinus pressure, nasal congestion, epistaxis and snoring  RESPIRATORY:  See HPI  CARDIOVASCULAR:  negative for chest pain, palpitations, exertional chest pressure/discomfort, edema, syncope  GASTROINTESTINAL:  negative for nausea, vomiting, diarrhea, constipation, blood in stool and abdominal pain  GENITOURINARY:  negative for frequency, dysuria, urinary incontinence, decreased urine volume, and hematuria  HEMATOLOGIC/LYMPHATIC:  negative for easy bruising, bleeding and lymphadenopathy  ALLERGIC/IMMUNOLOGIC:  negative for recurrent infections, angioedema, anaphylaxis and drug reactions  ENDOCRINE:  negative for weight changes and diabetic symptoms including polyuria, polydipsia and polyphagia  MUSCULOSKELETAL:  negative for  pain, joint swelling, decreased range of motion and muscle weakness  NEUROLOGICAL:  negative for headaches, slurred speech, unilateral weakness  PSYCHIATRIC/BEHAVIORAL: negative for hallucinations, behavioral problems, confusion and agitation.      Objective: PHYSICAL EXAM:      VITALS:  /62   Pulse 95   Temp 97.7 °F (36.5 °C) (Oral)   Resp 18   Ht 5' 2\" (1.575 m)   Wt 143 lb 4.8 oz (65 kg)   SpO2 99%   BMI 26.21 kg/m²      24HR INTAKE/OUTPUT:  No intake or output data in the 24 hours ending 07/20/21 1154  CONSTITUTIONAL:  awake, alert, cooperative, no apparent distress, and appears stated age  NECK:  Supple, symmetrical, trachea midline, no adenopathy, thyroid symmetric, not enlarged and no tenderness, skin normal  LUNGS:  no increased work of breathing and diminished to auscultation. No accessory muscle use  CARDIOVASCULAR: S1 and S2, no edema and no JVD  ABDOMEN:  normal bowel sounds, non-distended and no masses palpated, and no tenderness to palpation. No hepatospleenomegaly  LYMPHADENOPATHY:  no axillary or supraclavicular adenopathy. No cervical adnenopathy  PSYCHIATRIC: Oriented to person place and time. No obvious depression or anxiety. MUSCULOSKELETAL: No obvious misalignment or effusion of the joints. No clubbing, cyanosis of the digits. SKIN:  normal skin color, texture, turgor and no redness, warmth, or swelling. No palpable nodules    DATA:    Old records have been reviewed    CBC:  Recent Labs     07/19/21  1600 07/20/21  0537   WBC 6.6 3.5*   RBC 3.01* 2.73*   HGB 7.9* 7.2*   HCT 26.0* 22.7*    250   MCV 86.4 83.2   MCH 26.2 26.5   MCHC 30.4* 31.8   RDW 17.3* 16.8*      BMP:  Recent Labs     07/19/21  1600 07/20/21  0537   * 137   K 4.9 4.7    108   CO2 21 22   BUN 29* 32*   CREATININE 1.9* 2.0*   CALCIUM 8.3 8.5   GLUCOSE 82 143*      ABG:  No results for input(s): PHART, TQV3SJI, PO2ART, DBL9EED, E1YDMAOO, BEART in the last 72 hours. Procalcitonin  No results for input(s): PROCAL in the last 72 hours.     Lab Results   Component Value Date    BNP 37 02/09/2010     Lab Results   Component Value Date    CKTOTAL 66 07/20/2021    TROPONINI 0.01 07/19/2021           Radiology Review:  All pertinent images / reports were reviewed as a part of this visit. Assessment:     1. COPD exacerbation      Plan:     I have reviewed laboratories, medical records and images for this visit  Chest x-ray reveals hyperinflation and cardiomegaly. No acute infiltrate is noted. I suspect she has at least moderate to severe COPD/emphysema. Change Symbicort to Bevespi  Continue DuoNeb  Continue Solu-Medrol. Probably can change to prednisone tomorrow.   Obtain CT chest  Recommend outpatient PFT and pulmonary follow-up

## 2021-07-20 NOTE — PROGRESS NOTES
Pt has arrived to the unit. Vital signs stable. Pt is Alert and oriented x4. Head-to-toe and 4 Eyes skin assessment completed. Pt oriented to room and use of call light. Awaiting med verification from pharmacy. Pt ambulates independently in room. Pt expresses no further needs at this time.

## 2021-07-20 NOTE — CONSULTS
MD Vonda Arrington MD Jaynee Nieves, MD               Office: [phone]                      Fax: [phone]             [facility]                   NEPHROLOGY INITIAL CONSULT NOTE:     PATIENT NAME: Sanjay Rogers  : 1945  MRN: [id number]  REASON FOR CONSULT: For evaluation and management of Acute Kidney Injury . (My recommendations will be communicated by way of shared medical record.)      RECOMMENDATIONS:   - NS w/ IVF  - r/o paraproteinemia -due to uncontrolled anemia and renal failure  - check iron stores, FOBT   - check UA w/ microscopy, urine lytes,  -If no improvement, will need renal imaging  - monitor PVR w/ bladder scan for thrasher insertion need.     -No need for tight BP control, but okay with hydralazine significantly  -Monitor BP with Solu-Medrol, COPD treatment  -Smoking cessation    - consult, with bedbug issues  - at higher risk for decompensation, needing closer monitoring.     D/C plan from renal stand point:  -Unclear, new SANAM, follow, might need 2-3 days      IMPRESSION:       Admitted for:  COPD exacerbation ([facility] [J44.1]      SANAM (on CKD - 3 advancing):  Severe: Oligouric  - BL CKD (but no previous renal out pt f/up reported) --> Scr ~ 1.4-1.5 --> to 1.8 in 2019 ---> 2.4 on admission in  --> improved to 1.8 --> 1.9->2.0 this time   - so SANAM vs advancing CKD   : Etiology of SANAM - presumed pre-renal for now, + recent history of hydronephrosis    History of bladder cancer,   Unclear if she had any treatment,    Associated problems:   - Volume status: eu-volemic  CXR - No radiographic evidence of acute pulmonary disease.     : BP: HTN -needs better control  : Na: Hyponatremia-due to SANAM    - Azotemia: Prerenal likely   - Electrolytes: K: WNL  - Acid-Base: WNL  - Anemia: Mild, worsening, needs follow-up      Other major problems: Management per primary and other consulting teams.   //Bedbugs issue  //COPD exacerbation  //Smoker    Hospital Problems         Last Modified POA    COPD exacerbation ([facility] 7/19/2021 Yes        : other supportive care :   - Check daily renal function panel with electrolytes-phosphorus  - Strict monitoring of I/Os, daily weight  - Renal feeds/diet  - Current medications reviewed. - Nephrotoxic medications have been discontinued. - Dose adjusted and appropriate. - Dose meds for eGFR <15 mL/min/1.73m2 during SANAM    - Avoid heavy opioids due to renal failure - may use very low dose dilaudid / fentanyl with close monitoring of CNS and respiratory depression. Please refer to the orders. High Complexity. Multiple complex problems. Discussed with patient and treatment team-referring WINDY Garcia - CNP  Time spent > 30 (~35) minutes. Thank you for allowing me to participate in this patient's care. Please do not hesitate to contact me anytime. We will follow along with you. Cal Kim MD,  [facility] [address] [place] [address] [place]: [phone] or [employer]  Fax: [phone]        ========================================================   ========================================================       CHIEF COMPLAINT:   Chief Complaint   Patient presents with    Shortness of Breath     pt arrived via ems with shortness of breath and productive cough. Has hx of emphysema. Pt has several bedbugs on clothes and body. Several bites noted on body.      History Obtained From:  patient + treatment team + Electronic Medical Records    HPI: Ms. Lupe Block is a 64 y.o. female with significant past medical history as below:   Past Medical History:   Diagnosis Date    Arthritis     Asthma     [facility]     Cancer ([facility]     bladder    COPD (chronic obstructive pulmonary disease) ([facility]     Depression     controlled w/meds    Diabetes mellitus ([facility]     diet control    Hypertension     Neuropathy     toes    Obesity, unspecified  Osteoarthrosis, unspecified whether generalized or localized, lower leg     PVD (peripheral vascular disease) ([facility]     Tobacco use disorder     Unspecified cataract       Presents with Shortness of Breath (pt arrived via ems with shortness of breath and productive cough. Has hx of emphysema. Pt has several bedbugs on clothes and body. Several bites noted on body.)    Admitted with COPD exacerbation ([facility] [J44.1]    Found to have SANAM, so we are called for that. No current active complaints. Patient denied chest pain / dizziness/lightheadedness/syncope/ SOB / leg edema. Regarding: SANAM on CKD  · Duration: acute on chronic  · Location: kidneys  · Severity: Severe   · Timing: continous  · Context (ex: related to condition):  , refer to my assessment / impression. · Modifying factors (ex: medications, interventions):  , refer to my plan / recommendation. · Associated signs & symptoms (ex: edema, SOB): As mentioned above in CC and HPI      Past medical, Surgical, Social, Family medical history reviewed by me.      PAST MEDICAL HISTORY:   Past Medical History:   Diagnosis Date    Arthritis     Asthma     Bipolar 1 disorder ([facility]     Cancer ([facility]     bladder    COPD (chronic obstructive pulmonary disease) ([facility]     Depression     controlled w/meds    Diabetes mellitus (HCC)     diet control    Hypertension     Neuropathy     toes    Obesity, unspecified     Osteoarthrosis, unspecified whether generalized or localized, lower leg     PVD (peripheral vascular disease) ([facility]     Tobacco use disorder     Unspecified cataract      PAST SURGICAL HISTORY:   Past Surgical History:   Procedure Laterality Date    ABDOMEN SURGERY      BLADDER REMOVAL      partial    CHOLECYSTECTOMY      COLONOSCOPY      CYSTOSCOPY  4-7-10    fulgeration of bladder tumor    CYSTOSCOPY  7/14/10    Cytology    CYSTOSCOPY  12/15/10    CYSTOSCOPY  3/2/11    COLLAGEN INJECTION    CYSTOSCOPY  6-    cytology    CYSTOSCOPY  10/19/11    CYSTOSCOPY  12    CYSTOSCOPY  10/8/12    cytology    CYSTOSCOPY  14    cysto/cytology    CYSTOSCOPY  2015    Cytology study    CYSTOSCOPY Right 2020    CYSTOSCOPY RIGHT URETERAL STENT REMOVAL, RIGHT RETROGRADE PYELOGRAM performed by Mary Benz MD at [address] [place] [address]      cataract left eye    FEMUR FRACTURE SURGERY Left [id number]    GASTRIC BYPASS SURGERY      JOINT REPLACEMENT      left knee    OTHER SURGICAL HISTORY Left 10/24/2016    left nephroureterectomy    OTHER SURGICAL HISTORY  2017    RIGHT ureteral stent placement, RIGHT nephrostomy drain placement     TOTAL KNEE ARTHROPLASTY Left      FAMILY HISTORY:   Family History   Problem Relation Age of Onset    Heart Disease Brother     Anesth Problems Mother     Heart Disease Mother     Stroke Brother     Heart Disease Father      SOCIAL HISTORY:   Social History     Socioeconomic History    Marital status:      Spouse name: None    Number of children: 2    Years of education: None    Highest education level: None   Occupational History    None   Tobacco Use    Smoking status: Former Smoker     Packs/day: 1.00     Years: 55.00     Pack years: 55.00     Types: Cigarettes    Smokeless tobacco: Never Used    Tobacco comment: QUIT 6 MONTHS AGO   Vaping Use    Vaping Use: Never used   Substance and Sexual Activity    Alcohol use: Not Currently     Alcohol/week: 0.0 standard drinks    Drug use: No    Sexual activity: Not Currently   Other Topics Concern    None   Social History Narrative    None     Social Determinants of Health     Financial Resource Strain:     Difficulty of Paying Living Expenses:    Food Insecurity:     Worried About Running Out of Food in the Last Year:     Ran Out of Food in the Last Year:    Transportation Needs:     Lack of Transportation (Medical):      Lack of Transportation (Non-Medical):    Physical Activity:     Days of Exercise per MD, 10 mL at 07/19/21 2244    sodium chloride flush 0.9 % injection 5-40 mL, 5-40 mL, Intravenous, PRN, Adelina Willoughby MD    0.9 % sodium chloride infusion, 25 mL, Intravenous, PRN, Adelina Willoughby MD    enoxaparin (LOVENOX) injection 30 mg, 30 mg, Subcutaneous, Daily, Adelina Willoughby MD    ondansetron (ZOFRAN-ODT) disintegrating tablet 4 mg, 4 mg, Oral, Q8H PRN **OR** ondansetron (ZOFRAN) injection 4 mg, 4 mg, Intravenous, Q6H PRN, Adelina Willoughby MD    polyethylene glycol (GLYCOLAX) packet 17 g, 17 g, Oral, Daily PRN, Adelina Willoughby MD    acetaminophen (TYLENOL) tablet 650 mg, 650 mg, Oral, Q6H PRN **OR** acetaminophen (TYLENOL) suppository 650 mg, 650 mg, Rectal, Q6H PRN, Adelina Willoughby MD    hydrALAZINE (APRESOLINE) injection 10 mg, 10 mg, Intravenous, Q4H PRN, Adelina Willoughby MD    hydrALAZINE (APRESOLINE) tablet 25 mg, 25 mg, Oral, 3 times per day, Adelina Willoughby MD, 25 mg at 07/20/21 1406      Allergies reviewed by me: Aspirin, Ibuprofen, Adhesive tape, Hydrochlorothiazide, Microderm base [albolene], Nsaids, Voltaren [diclofenac sodium], and Procardia [nifedipine]    REVIEW OF SYSTEMS:  As mentioned in chief complaint and HPI , Subjective   All other 10-point review of systems: negative. PHYSICAL EXAM:  Recent vital signs and recent I/Os reviewed by me.      Wt Readings from Last 3 Encounters:   07/20/21 143 lb 4.8 oz (65 kg)   09/18/20 135 lb (61.2 kg)   09/13/20 140 lb (63.5 kg)     BP Readings from Last 3 Encounters:   07/20/21 (!) 157/80   09/19/20 (!) 139/56   09/13/20 (!) 156/47     Patient Vitals for the past 24 hrs:   BP Temp Temp src Pulse Resp SpO2 Height Weight   07/20/21 0749 -- -- -- -- 18 100 % -- --   07/20/21 0646 -- -- -- -- -- 97 % -- --   07/20/21 0424 (!) 157/80 98.3 °F (36.8 °C) Oral 71 18 97 % -- 143 lb 4.8 oz (65 kg)   07/20/21 0057 (!) 165/64 98.1 °F (36.7 °C) Oral 80 18 95 % -- --   07/19/21 2012 (!) 161/74 98.4 °F (36.9 °C) Oral 86 20 96 % 5' 2\" (1.575 m) 142 lb 10.2 oz (64.7 kg) 07/19/21 1930 (!) 145/65 -- -- 82 22 98 % -- --   07/19/21 1900 (!) 185/60 -- -- 91 22 98 % -- --   07/19/21 1830 (!) 155/60 -- -- 85 21 99 % -- --   07/19/21 1800 (!) 146/62 -- -- 81 22 99 % -- --   07/19/21 1730 (!) 150/62 -- -- 85 22 99 % -- --   07/19/21 1700 (!) 135/58 -- -- 78 23 98 % -- --   07/19/21 1630 (!) 144/48 -- -- 80 19 93 % -- --   07/19/21 1600 (!) 162/65 -- -- 86 13 (!) 77 % -- --   07/19/21 1551 -- -- -- -- 18 100 % -- --   07/19/21 1530 (!) 153/61 -- -- 77 25 100 % -- --   07/19/21 1457 (!) 154/53 98.5 °F (36.9 °C) Oral 70 18 100 % 5' 2\" (1.575 m) 135 lb (61.2 kg)     No intake or output data in the 24 hours ending 07/20/21 6305       Physical Exam  Vitals reviewed. Constitutional:       General: She is not in acute distress. Appearance: Normal appearance. She is ill-appearing. HENT:      Head: Normocephalic and atraumatic. Right Ear: External ear normal.      Left Ear: External ear normal.      Nose: Nose normal.      Mouth/Throat:      Mouth: Mucous membranes are moist. Mucous membranes are not dry. Eyes:      General: No scleral icterus. Conjunctiva/sclera: Conjunctivae normal.   Neck:      Vascular: No JVD. Cardiovascular:      Rate and Rhythm: Normal rate and regular rhythm. Heart sounds: S1 normal and S2 normal.   Pulmonary:      Effort: Pulmonary effort is normal. No respiratory distress. Breath sounds: Normal breath sounds. Abdominal:      General: Bowel sounds are normal. There is no distension. Musculoskeletal:         General: No swelling or deformity. Cervical back: Normal range of motion and neck supple. Skin:     General: Skin is dry. Coloration: Skin is not jaundiced. Neurological:      Mental Status: She is alert and oriented to person, place, and time. Mental status is at baseline.    Psychiatric:         Mood and Affect: Mood normal.         Behavior: Behavior normal.           DATA:  Diagnostic tests reviewed by me for today's

## 2021-07-20 NOTE — PROGRESS NOTES
[facility]    7/20/2021 7:49 AM        Name: June Boothe . Admitted: 7/19/2021  Primary Care Provider: No primary care provider on file. (Tel: None)      Subjective:  . Admitted with shortness of breath  Smoked for 50 years  Reports breathing is currently \"better\"     Reports chronic constipation  Never had a C scope   [place]  Has help from [address] for housekeeping,  However she reports + bed bugs and roaches. Previous weighed 300 lbs 2 years ago. Syl Eddy Has lost 150 lbs. ..      Reviewed interval ancillary notes    Current Medications  ipratropium-albuterol (DUONEB) nebulizer solution 1 ampule, Q4H WA  budesonide-formoterol (SYMBICORT) 160-4.5 MCG/ACT inhaler 2 puff, BID  methylPREDNISolone sodium (SOLU-MEDROL) injection 40 mg, Q12H  sodium chloride flush 0.9 % injection 5-40 mL, 2 times per day  sodium chloride flush 0.9 % injection 5-40 mL, PRN  0.9 % sodium chloride infusion, PRN  enoxaparin (LOVENOX) injection 30 mg, Daily  ondansetron (ZOFRAN-ODT) disintegrating tablet 4 mg, Q8H PRN   Or  ondansetron (ZOFRAN) injection 4 mg, Q6H PRN  polyethylene glycol (GLYCOLAX) packet 17 g, Daily PRN  acetaminophen (TYLENOL) tablet 650 mg, Q6H PRN   Or  acetaminophen (TYLENOL) suppository 650 mg, Q6H PRN  hydrALAZINE (APRESOLINE) injection 10 mg, Q4H PRN  hydrALAZINE (APRESOLINE) tablet 25 mg, 3 times per day  ipratropium-albuterol (DUONEB) 0.5-2.5 (3) MG/3ML nebulizer solution,         Objective:  BP (!) 157/80   Pulse 71   Temp 98.3 °F (36.8 °C) (Oral)   Resp 18   Ht 5' 2\" (1.575 m)   Wt 143 lb 4.8 oz (65 kg)   SpO2 97%   BMI 26.21 kg/m²   No intake or output data in the 24 hours ending 07/20/21 0749   Wt Readings from Last 3 Encounters:   07/20/21 143 lb 4.8 oz (65 kg)   09/18/20 135 lb (61.2 kg)   09/13/20 140 lb (63.5 kg)       General appearance:  Appears comfortable in the bed. She is alert and conversant. She is oriented time 3. Eyes: Sclera clear. Pupils equal.  ENT: Moist oral mucosa. Trachea midline, no adenopathy. Cardiovascular: Regular rhythm, normal S1, S2. No murmur. No edema in lower extremities  Respiratory: Not using accessory muscles. Good inspiratory effort. Few scattered wheezes noted. GI: Abdomen soft, no tenderness, not distended, normal bowel sounds  Musculoskeletal: No cyanosis in digits, neck supple  Neurology: CN 2-12 grossly intact. No speech or motor deficits  Psych: Normal affect. Alert and oriented in time, place and person  Skin: Warm, dry, normal turgor    Labs and Tests:  CBC:   Recent Labs     07/19/21  1600 07/20/21  0537   WBC 6.6 3.5*   HGB 7.9* 7.2*    250     BMP:    Recent Labs     07/19/21  1600 07/20/21  0537   * 137   K 4.9 4.7    108   CO2 21 22   BUN 29* 32*   CREATININE 1.9* 2.0*   GLUCOSE 82 143*     Hepatic:   Recent Labs     07/19/21  1600   AST 25   ALT 9*   BILITOT <0.2   ALKPHOS 179*           Problem List  Active Problems:    COPD exacerbation (HCC)  Resolved Problems:    * No resolved hospital problems. *       Assessment & Plan:   1. COPD:  She has been seen by pulmonary and CT of chest has been ordered. Will continue with nebs, IV solu medrol and current inhaled therapy . Will need outpatient PFT's  2. CKD with hx of bladder cancer:  Nephrology to follow. She is not on any nephrotoxic agents  3. Anemia with 150 lb weight loss: Will ask GI to evaluate,  Iron studies are pending  4. Hx of bipolar depression per chart review, however pt is not currently on any medications. 5. SW to follow       Diet: ADULT DIET;  Regular  Code:Full Code  DVT PPX      WINDY Sanchez - CNP   7/20/2021 7:49 AM

## 2021-07-20 NOTE — H&P
HOSPITALISTS HISTORY AND PHYSICAL    7/19/2021 8:10 PM    Patient Information:  Eddi Valles is a 64 y.o. female [id number]  PCP:  No primary care provider on file. (Tel: None )    Chief complaint:    Chief Complaint   Patient presents with    Shortness of Breath     pt arrived via ems with shortness of breath and productive cough. Has hx of emphysema. Pt has several bedbugs on clothes and body. Several bites noted on body. History of Present Illness:  Cayden Chase is a 64 y.o. female with h/o COPD , Stage 2 CKD, nicotine use, bladder CA presents with c/o dyspnea and productive cough. She is found to be in COPD exacerbation  , received breathing treatment and dose of solumedrol in the ED     REVIEW OF SYSTEMS:   Constitutional: Negative for fever,chills or night sweats  ENT: Negative for rhinorrhea, epistaxis, hoarseness, sore throat. Respiratory: +ve for shortness of breath,wheezing  Cardiovascular: Negative for chest pain, palpitations   Gastrointestinal: Negative for nausea, vomiting, diarrhea  Genitourinary: Negative for polyuria, dysuria   Hematologic/Lymphatic: Negative for bleeding tendency, easy bruising  Musculoskeletal: Negative for myalgias and arthralgias  Neurologic: Negative for confusion,dysarthria. Skin: Negative for itching,rash  Psychiatric: Negative for depression,anxiety, agitation. Endocrine: Negative for polydipsia,polyuria,heat /cold intolerance. Past Medical History:   has a past medical history of Arthritis, Asthma, Bipolar 1 disorder ([address] Cancer ([address] COPD (chronic obstructive pulmonary disease) ([address] Depression, Diabetes mellitus ([address] Hypertension, Neuropathy, Obesity, unspecified, Osteoarthrosis, unspecified whether generalized or localized, lower leg, PVD (peripheral vascular disease) ([address] Tobacco use disorder, and Unspecified cataract.      Past Surgical History:   has a past surgical history that includes Cholecystectomy; Gastric bypass surgery; Bladder removal; joint replacement; eye surgery; Cystoscopy (4-7-10); Cystoscopy (7/14/10); Cystoscopy (12/15/10); Cystoscopy (3/2/11); Cystoscopy (6-); Cystoscopy (10/19/11); Cystoscopy (4/11/12); Cystocopy (10/8/12); Cystocopy (4/7/14); Total knee arthroplasty (Left); Femur fracture surgery (Left, [id number]); Cystoscopy (04/29/2015); Colonoscopy; Abdomen surgery; other surgical history (Left, 10/24/2016); other surgical history (03/22/2017); and Cystoscopy (Right, 1/16/2020). Medications:  No current facility-administered medications on file prior to encounter. Current Outpatient Medications on File Prior to Encounter   Medication Sig Dispense Refill    albuterol sulfate  (90 Base) MCG/ACT inhaler Use 2 puffs 4 times daily for 7 days then as needed for wheezing. Dispense with Spacer and instruct in use. At patient's preference may use 60 dose MDI. May Sub Pro-Air or Proventil as needed per insurance. 1 Inhaler 0    ipratropium-albuterol (DUONEB) 0.5-2.5 (3) MG/3ML SOLN nebulizer solution Inhale 3 mLs into the lungs every 4 hours as needed for Shortness of Breath 360 mL 0    Cyanocobalamin (VITAMIN B-12) 1000 MCG extended release tablet Take 1 tablet by mouth daily 90 tablet 3    acetaminophen (TYLENOL) 500 MG tablet Take 500 mg by mouth every 6 hours as needed for Pain.        Current Facility-Administered Medications   Medication Dose Route Frequency Provider Last Rate Last Admin    [START ON 7/20/2021] ipratropium-albuterol (DUONEB) nebulizer solution 1 ampule  1 ampule Inhalation Q4H WA Mer Salguero MD        mometasone-formoterol (DULERA) 200-5 MCG/ACT inhaler 2 puff  2 puff Inhalation BID Mer Salguero MD       [facility] methylPREDNISolone sodium (SOLU-MEDROL) injection 40 mg  40 mg Intravenous Q12H Mer Salguero MD        sodium chloride flush 0.9 % injection 5-40 mL  5-40 mL Intravenous 2 times per day Ne Briseida Colmenares MD        sodium chloride flush 0.9 % injection 5-40 mL  5-40 mL Intravenous PRN Radames Nieves MD        0.9 % sodium chloride infusion  25 mL Intravenous PRN Radames Nieves MD        enoxaparin (LOVENOX) injection 30 mg  30 mg Subcutaneous Daily Radames Nieves MD        ondansetron (ZOFRAN-ODT) disintegrating tablet 4 mg  4 mg Oral Q8H PRN Radames Nieves MD        Or    ondansetron (ZOFRAN) injection 4 mg  4 mg Intravenous Q6H PRN Radames Nieves MD        polyethylene glycol (GLYCOLAX) packet 17 g  17 g Oral Daily PRN Radames Nieves MD        acetaminophen (TYLENOL) tablet 650 mg  650 mg Oral Q6H PRN Radames Nieves MD        Or   [address] [place] [address] acetaminophen (TYLENOL) suppository 650 mg  650 mg Rectal Q6H PRN Radames Nieves MD        hydrALAZINE (APRESOLINE) injection 10 mg  10 mg Intravenous Q4H PRN Radames Nieves MD        lisinopril (PRINIVIL;ZESTRIL) tablet 5 mg  5 mg Oral Daily Radames Nieves MD         Current Outpatient Medications   Medication Sig Dispense Refill    albuterol sulfate  (90 Base) MCG/ACT inhaler Use 2 puffs 4 times daily for 7 days then as needed for wheezing. Dispense with Spacer and instruct in use. At patient's preference may use 60 dose MDI. May Sub Pro-Air or Proventil as needed per insurance. 1 Inhaler 0    ipratropium-albuterol (DUONEB) 0.5-2.5 (3) MG/3ML SOLN nebulizer solution Inhale 3 mLs into the lungs every 4 hours as needed for Shortness of Breath 360 mL 0    Cyanocobalamin (VITAMIN B-12) 1000 MCG extended release tablet Take 1 tablet by mouth daily 90 tablet 3    acetaminophen (TYLENOL) 500 MG tablet Take 500 mg by mouth every 6 hours as needed for Pain. Allergies: Allergies   Allergen Reactions    Aspirin Shortness Of Breath    Ibuprofen Shortness Of Breath    Adhesive Tape Swelling     Only if it stays on a long time    Hydrochlorothiazide Other (See Comments)     Palpitations.      Microderm Base [Albolene]     Nsaids Other (See Comments)     Respiratory failure.  Voltaren [Diclofenac Sodium]      Wheezing, swelling.  Procardia [Nifedipine] Swelling and Rash        Social History:   reports that she has quit smoking. Her smoking use included cigarettes. She has a 55.00 pack-year smoking history. She has never used smokeless tobacco. She reports previous alcohol use. She reports that she does not use drugs. Family History:  family history includes Anesth Problems in her mother; Heart Disease in her brother, father, and mother; Stroke in her brother. ,     Physical Exam:  BP (!) 145/65   Pulse 82   Temp 98.5 °F (36.9 °C) (Oral)   Resp 22   Ht 5' 2\" (1.575 m)   Wt 135 lb (61.2 kg)   SpO2 98%   BMI 24.69 kg/m²     General appearance:  Appears comfortable. Well nourished  Eyes: Sclera clear, pupils equal  ENT: Moist mucus membranes, no thrush. Trachea midline. Cardiovascular: Regular rhythm, normal S1, S2. No murmur, gallop, rub. No edema in lower extremities  Respiratory: Clear to auscultation bilaterally, +Ve wheeze, good inspiratory effort  Gastrointestinal: Abdomen soft, non-tender, not distended, normal bowel sounds  Musculoskeletal: No cyanosis in digits, neck supple  Neurology: Cranial nerves grossly intact. Alert and oriented in time, place and person. No speech or motor deficits  Psychiatry: Appropriate affect.  Not agitated  Skin: Warm, dry, normal turgor, no rash    Labs:  CBC:   Lab Results   Component Value Date    WBC 6.6 07/19/2021    RBC 3.01 07/19/2021    HGB 7.9 07/19/2021    HCT 26.0 07/19/2021    MCV 86.4 07/19/2021    MCH 26.2 07/19/2021    MCHC 30.4 07/19/2021    RDW 17.3 07/19/2021     07/19/2021    MPV 9.1 07/19/2021     BMP:    Lab Results   Component Value Date     07/19/2021    K 4.9 07/19/2021    K 4.2 09/18/2020     07/19/2021    CO2 21 07/19/2021    BUN 29 07/19/2021    CREATININE 1.9 07/19/2021    CALCIUM 8.3 07/19/2021    GFRAA 31 07/19/2021    GFRAA 57 10/08/2012    LABGLOM 26 07/19/2021    GLUCOSE 82 07/19/2021       Chest Xray:   EKG:        Problem List  Active Problems:    COPD exacerbation ([address]  Resolved Problems:    * No resolved hospital problems. *        Assessment/Plan:       COPD (acute exacerbation)   -  continue Nebulizer treatment  - IV  Solumedrol ,   taper as the patient improves. Chest xray is neg for pneumonia    HTN uncontrolled  Started on hydralazine  Prn medications added     CKD stage 3       Bed bug issues  SW on board for PR planning    Admit as inpatie. I anticipate hospitalization spanning more than two midnights for investigation and treatment of the above medically necessary diagnoses.       Irene Gunn MD    7/19/2021 8:10 PM

## 2021-07-20 NOTE — PROGRESS NOTES
Pt continent of yellow clear urine collected and sent to lab. Pt independent in room up in chair call light in reach. Pleasant affect. Environmental precautions. No activity in room noted.

## 2021-07-21 NOTE — PROGRESS NOTES
Chart and recent labs reviewed. Patient will required Outpatient EGD and Colonoscopy once medically stable. This can be done outpatient. Will sign off, call if needed.        Maximo Amado, [address] [place] [address] Street

## 2021-07-21 NOTE — PLAN OF CARE
Problem: Breathing Pattern - Ineffective:  Goal: Ability to achieve and maintain a regular respiratory rate will improve  Description: Ability to achieve and maintain a regular respiratory rate will improve  Outcome: Ongoing     Problem: Skin Integrity:  Goal: Will show no infection signs and symptoms  Description: Will show no infection signs and symptoms  Outcome: Ongoing  Goal: Absence of new skin breakdown  Description: Absence of new skin breakdown  Outcome: Ongoing

## 2021-07-21 NOTE — PROGRESS NOTES
Kailey Sánchez MD                       Primus Officer, MD Tiffany Castillo MD               Office: [phone]                      Fax: [phone]             [facility]                   NEPHROLOGY INPATIENT PROGRESS NOTE:     PATIENT NAME: Callie Nguyễn  : 1945  MRN: [id number]       RECOMMENDATIONS:   - continue NS w/ IVF for 1 more day   - r/o paraproteinemia -due to uncontrolled anemia and renal failure  - reviewed iron stores - very low: RX IV Iron   - FOBT (-) : but might need Endoscopy d/to severe anemia + weight loss  -If no improvement, will need renal imaging    - can be done outpt  - monitoring PVR w/ bladder scan for thrasher insertion need.     -No need for tight BP control, but okay with hydralazine    - Lisinopril - stopped    -Monitor BP with Solu-Medrol, COPD treatment    -Smoking cessation  -'s assistance with bedbug issues    D/C plan from renal stand point:  -in 1-2 days       IMPRESSION:       Admitted for:  COPD exacerbation ([facility] [address] [J44.1]      SANAM (on CKD - 3 advancing):  Severe:   - Oligouric as incontinence   - BL CKD (but no previous renal out pt f/up reported) --> Scr ~ 1.4-1.5 --> to 1.8 in 2019 ---> 2.4 on admission in  --> improved to 1.8 --> 1.9->2.0 this time   - so SANAM vs advancing CKD   : Etiology of SANAM - presumed pre-renal for now, + recent history of hydronephrosis  : UA results reviewed = (+) Nitrates, bacte; but (-) LE, WBC minimal 3-5    = so unlikely UTI - monitor for now       History of bladder cancer,   Unclear if she had any treatment,      Associated problems:   - Volume status: eu-volemic, although BNP 4000s  CXR - No radiographic evidence of acute pulmonary disease.     : BP: HTN -needs better control  : Na: Hyponatremia-due to SANAM    - Azotemia: Prerenal likely   - Electrolytes: K: WNL  - Acid-Base: WNL  - Anemia: Mild, worsening, needs follow-up      Other major problems: Management per primary and other consulting teams.   //Bedbugs issue  //COPD exacerbation  //Smoker    Hospital Problems         Last Modified POA    COPD exacerbation ([facility] [address] 7/19/2021 Yes        : other supportive care :   - Check daily renal function panel with electrolytes-phosphorus  - Strict monitoring of I/Os, daily weight  - Renal feeds/diet  - Current medications reviewed. - Nephrotoxic medications have been discontinued. - Dose adjusted and appropriate. - Dose meds for eGFR <15 mL/min/1.73m2 during SANAM    - Avoid heavy opioids due to renal failure - may use very low dose dilaudid / fentanyl with close monitoring of CNS and respiratory depression. Please refer to the orders. High Complexity. Multiple complex problems. Discussed with patient and treatment team-referring WINDY Garcia - CNP  Time spent > 30 (~35) minutes. Thank you for allowing me to participate in this patient's care. Please do not hesitate to contact me anytime. We will follow along with you. Adrianne Salazar MD,  [facility] [address] [place] [address] [place]: [phone] or Via [employer]  Fax: [phone]        ========================================================   ========================================================   Subjective:  Patient was seen comfortably sitting up in the chair,   Reported no active complaints,   Renal function stable, but not improving     Past medical, Surgical, Social, Family medical history reviewed by me. MEDICATIONS: reviewed by me. Medications Prior to Admission:  No current facility-administered medications on file prior to encounter. Current Outpatient Medications on File Prior to Encounter   Medication Sig Dispense Refill    albuterol sulfate  (90 Base) MCG/ACT inhaler Use 2 puffs 4 times daily for 7 days then as needed for wheezing. Dispense with Spacer and instruct in use. At patient's preference may use 60 dose MDI. May Sub Pro-Air or Proventil as needed per insurance.  1 Inhaler 0    ipratropium-albuterol (DUONEB) 0.5-2.5 (3) MG/3ML SOLN nebulizer solution Inhale 3 mLs into the lungs every 4 hours as needed for Shortness of Breath 360 mL 0    Cyanocobalamin (VITAMIN B-12) 1000 MCG extended release tablet Take 1 tablet by mouth daily 90 tablet 3    acetaminophen (TYLENOL) 500 MG tablet Take 500 mg by mouth every 6 hours as needed for Pain.            Current Facility-Administered Medications:     iron sucrose (VENOFER) 200 mg in sodium chloride 0.9 % 100 mL IVPB, 200 mg, Intravenous, Q24H, Violeta Montejo, WINDY - CNP    glycopyrrolate-formoterol (BEVESPI) 9-4.8 MCG/ACT inhaler 2 puff, 2 puff, Inhalation, BID, Aleshia Jain MD, 2 puff at 07/21/21 1007    0.9 % sodium chloride infusion, , Intravenous, Continuous, Cal Kim MD, Last Rate: 100 mL/hr at 07/20/21 1930, New Bag at 07/20/21 1930    ipratropium-albuterol (DUONEB) nebulizer solution 1 ampule, 1 ampule, Inhalation, Q4H WA, Joy Betts MD, 1 ampule at 07/21/21 1007    methylPREDNISolone sodium (SOLU-MEDROL) injection 40 mg, 40 mg, Intravenous, Q12H, Joy Betts MD, 40 mg at 07/20/21 1800    sodium chloride flush 0.9 % injection 5-40 mL, 5-40 mL, Intravenous, 2 times per day, Joy Betts MD, 10 mL at 07/20/21 2131    sodium chloride flush 0.9 % injection 5-40 mL, 5-40 mL, Intravenous, PRN, Joy Betts MD    0.9 % sodium chloride infusion, 25 mL, Intravenous, PRN, Joy Betts MD    enoxaparin (LOVENOX) injection 30 mg, 30 mg, Subcutaneous, Daily, Ne Ruiz MD, 30 mg at 07/21/21 0911    ondansetron (ZOFRAN-ODT) disintegrating tablet 4 mg, 4 mg, Oral, Q8H PRN **OR** ondansetron (ZOFRAN) injection 4 mg, 4 mg, Intravenous, Q6H PRN, Joy Betts MD    polyethylene glycol (GLYCOLAX) packet 17 g, 17 g, Oral, Daily PRN, Joy Betts MD    acetaminophen (TYLENOL) tablet 650 mg, 650 mg, Oral, Q6H PRN **OR** acetaminophen (TYLENOL) suppository 650 mg, 650 mg, Rectal, Q6H PRN, MD Tami Morrison hydrALAZINE (APRESOLINE) injection 10 mg, 10 mg, Intravenous, Q4H PRN, Ulises Galo MD    hydrALAZINE (APRESOLINE) tablet 25 mg, 25 mg, Oral, 3 times per day, Ulises Galo MD, 25 mg at 07/21/21 0559    REVIEW OF SYSTEMS:  As mentioned in chief complaint and HPI , Subjective     PHYSICAL EXAM:  Recent vital signs and recent I/Os reviewed by me. Wt Readings from Last 3 Encounters:   07/20/21 143 lb 4.8 oz (65 kg)   09/18/20 135 lb (61.2 kg)   09/13/20 140 lb (63.5 kg)     BP Readings from Last 3 Encounters:   07/21/21 (!) 110/57   09/19/20 (!) 139/56   09/13/20 (!) 156/47     Patient Vitals for the past 24 hrs:   BP Temp Temp src Pulse Resp SpO2   07/21/21 1048 (!) 110/57 97.5 °F (36.4 °C) Axillary 95 16 98 %   07/21/21 1030 110/65 98.6 °F (37 °C) Oral 80 16 97 %   07/21/21 0456 129/66 98.5 °F (36.9 °C) Oral 83 18 96 %   07/21/21 0030 105/61 98 °F (36.7 °C) Oral 84 18 97 %   07/20/21 2122 (!) 158/63 98.2 °F (36.8 °C) Oral 82 18 97 %   07/20/21 1547 -- -- -- -- 18 99 %       Intake/Output Summary (Last 24 hours) at 7/21/2021 1213  Last data filed at 7/21/2021 1038  Gross per 24 hour   Intake 960 ml   Output --   Net 960 ml          Physical Exam  Vitals reviewed. Constitutional:       General: She is not in acute distress. Appearance: Normal appearance. She is ill-appearing. HENT:      Head: Normocephalic and atraumatic. Right Ear: External ear normal.      Left Ear: External ear normal.      Nose: Nose normal.      Mouth/Throat:      Mouth: Mucous membranes are moist. Mucous membranes are not dry. Eyes:      General: No scleral icterus. Conjunctiva/sclera: Conjunctivae normal.   Neck:      Vascular: No JVD. Cardiovascular:      Rate and Rhythm: Normal rate and regular rhythm. Heart sounds: S1 normal and S2 normal.   Pulmonary:      Effort: Pulmonary effort is normal. No respiratory distress. Breath sounds: Normal breath sounds.    Abdominal:      General: Bowel sounds are normal. There is no distension. Musculoskeletal:         General: No swelling or deformity. Cervical back: Normal range of motion and neck supple. Skin:     General: Skin is dry. Coloration: Skin is not jaundiced. Neurological:      Mental Status: She is alert and oriented to person, place, and time. Mental status is at baseline. Psychiatric:         Mood and Affect: Mood normal.         Behavior: Behavior normal.           DATA:  Diagnostic tests reviewed by me for today's visit:   (AS NEEDED FOR MY EVALUATION AND MANAGEMENT). Recent Labs     07/19/21  1600 07/20/21  0537 07/21/21  0557   WBC 6.6 3.5* 9.0   HCT 26.0* 22.7* 23.2*    250 277     Iron Saturation:  No components found for: PERCENTFE  FERRITIN:    Lab Results   Component Value Date    FERRITIN 15.2 07/20/2021     IRON:    Lab Results   Component Value Date    IRON 16 07/20/2021     TIBC:    Lab Results   Component Value Date    TIBC 287 07/20/2021       Recent Labs     07/19/21  1600 07/20/21  0537 07/21/21  0557   * 137 138   K 4.9 4.7 4.6    108 106   CO2 21 22 23   BUN 29* 32* 38*   CREATININE 1.9* 2.0* 2.1*     Recent Labs     07/19/21  1600 07/20/21  0537 07/21/21  0557   CALCIUM 8.3 8.5 8.4   PHOS  --   --  4.4     No results for input(s): PH, PCO2, PO2 in the last 72 hours.     Invalid input(s): Pasqual Splinter    ABG:  No results found for: PH, PCO2, PO2, HCO3, BE, THGB, TCO2, O2SAT  VBG:    Lab Results   Component Value Date    PHVEN 7.411 06/14/2020    PWT2CFC 37.6 06/14/2020    BEVEN -0.6 06/14/2020    [id number] 98 06/14/2020       LDH:    Lab Results   Component Value Date     09/07/2020     Uric Acid:    Lab Results   Component Value Date    LABURIC 7.3 07/20/2021       PT/INR:    Lab Results   Component Value Date    PROTIME 10.4 06/25/2020    INR 0.90 06/25/2020     Warfarin PT/INR:  No components found for: PTPATWAR, PTINRWAR  PTT:    Lab Results   Component Value Date    APTT 26.3 06/25/2020   [APTT}  Last 3 Troponin:    Lab Results   Component Value Date    TROPONINI 0.01 07/19/2021    TROPONINI <0.01 09/18/2020    TROPONINI <0.01 09/07/2020       U/A:    Lab Results   Component Value Date    COLORU Yellow 07/20/2021    PROTEINU Negative 07/20/2021    PHUR 5.5 07/20/2021    WBCUA 3-5 07/20/2021    RBCUA None seen 07/20/2021    YEAST none 08/12/2016    BACTERIA 4+ 07/20/2021    CLARITYU SLCLOUDY 07/20/2021    SPECGRAV 1.020 07/20/2021    LEUKOCYTESUR Negative 07/20/2021    UROBILINOGEN 0.2 07/20/2021    BILIRUBINUR Negative 07/20/2021    BLOODU Negative 07/20/2021    GLUCOSEU Negative 07/20/2021     Microalbumen/Creatinine ratio:  No components found for: RUCREAT  24 Hour Urine for Protein:  No components found for: RAWUPRO, UHRS3, QWAN03VQ, UTV3  24 Hour Urine for Creatinine Clearance:  No components found for: CREAT4, UHRS10, UTV10  Urine Toxicology:  No components found for: Kaylan Ades, IBENZO, ICOCAINE, IMARTHC, IOPIATES, IPHENCYC    HgBA1c:    Lab Results   Component Value Date    LABA1C 5.1 03/07/2018     RPR:  No results found for: RPR  HIV:  No results found for: HIV  MEHUL:    Lab Results   Component Value Date    MEHUL Negative 08/10/2011     RF:  No results found for: RF  DSDNA:  No components found for: DNA  AMYLASE:    Lab Results   Component Value Date    AMYLASE 50 06/14/2020     LIPASE:    Lab Results   Component Value Date    LIPASE 46.0 06/14/2020     Fibrinogen Level:  No components found for: FIB       BELOW MENTIONED RADIOLOGY STUDY RESULTS BY ME (AS NEEDED FOR MY EVALUATION AND MANAGEMENT). XR CHEST PORTABLE    Result Date: 7/19/2021  EXAMINATION: ONE XRAY VIEW OF THE CHEST 7/19/2021 3:28 pm COMPARISON: Chest x-ray dated 09/18/2020 HISTORY: ORDERING SYSTEM PROVIDED HISTORY: SOB TECHNOLOGIST PROVIDED HISTORY: Reason for exam:->SOB Reason for Exam: SOB Acuity: Acute Type of Exam: Initial FINDINGS: HEART/MEDIASTINUM: The cardiomediastinal silhouette is stable. PLEURA/LUNGS: There are no focal consolidations or pleural effusions. There is no appreciable pneumothorax. BONES/SOFT TISSUE: No acute abnormality. No radiographic evidence of acute pulmonary disease.

## 2021-07-21 NOTE — PROGRESS NOTES
[facility] PROGRESS NOTE    7/21/2021 8:08 AM        Name: Sreedhar Aaron . Admitted: 7/19/2021  Primary Care Provider: No primary care provider on file. (Tel: None)      Subjective:  . Admitted with shortness of breath  Smoked for 50 years  Reports breathing is currently \"better\"   Seen this am while up walking  States she is bored. Reports a stiff neck and back today         Reviewed interval ancillary notes    Current Medications  glycopyrrolate-formoterol (BEVESPI) 9-4.8 MCG/ACT inhaler 2 puff, BID  0.9 % sodium chloride infusion, Continuous  ipratropium-albuterol (DUONEB) nebulizer solution 1 ampule, Q4H WA  methylPREDNISolone sodium (SOLU-MEDROL) injection 40 mg, Q12H  sodium chloride flush 0.9 % injection 5-40 mL, 2 times per day  sodium chloride flush 0.9 % injection 5-40 mL, PRN  0.9 % sodium chloride infusion, PRN  enoxaparin (LOVENOX) injection 30 mg, Daily  ondansetron (ZOFRAN-ODT) disintegrating tablet 4 mg, Q8H PRN   Or  ondansetron (ZOFRAN) injection 4 mg, Q6H PRN  polyethylene glycol (GLYCOLAX) packet 17 g, Daily PRN  acetaminophen (TYLENOL) tablet 650 mg, Q6H PRN   Or  acetaminophen (TYLENOL) suppository 650 mg, Q6H PRN  hydrALAZINE (APRESOLINE) injection 10 mg, Q4H PRN  hydrALAZINE (APRESOLINE) tablet 25 mg, 3 times per day        Objective:  /66   Pulse 83   Temp 98.5 °F (36.9 °C) (Oral)   Resp 18   Ht 5' 2\" (1.575 m)   Wt 143 lb 4.8 oz (65 kg)   SpO2 96%   BMI 26.21 kg/m²     Intake/Output Summary (Last 24 hours) at 7/21/2021 0808  Last data filed at 7/20/2021 1919  Gross per 24 hour   Intake 480 ml   Output --   Net 480 ml      Wt Readings from Last 3 Encounters:   07/20/21 143 lb 4.8 oz (65 kg)   09/18/20 135 lb (61.2 kg)   09/13/20 140 lb (63.5 kg)       General appearance:  Appears comfortable, looks improved today. No increased work of breathing. Eyes: Sclera clear. Pupils equal.  ENT: Moist oral mucosa. Trachea midline, no adenopathy. Cardiovascular: Regular rhythm, normal S1, S2. No murmur. No edema in lower extremities  Respiratory: Not using accessory muscles. Good inspiratory effort. No current wheezes. GI: Abdomen soft, no tenderness, not distended, normal bowel sounds  Musculoskeletal: No cyanosis in digits, neck supple  Neurology: CN 2-12 grossly intact. No speech or motor deficits  Psych: Normal affect. Alert and oriented in time, place and person  Skin: Warm, dry, normal turgor    Labs and Tests:  CBC:   Recent Labs     07/19/21  1600 07/20/21  0537 07/21/21  0557   WBC 6.6 3.5* 9.0   HGB 7.9* 7.2* 7.1*    250 277     BMP:    Recent Labs     07/19/21  1600 07/20/21  0537 07/21/21  0557   * 137 138   K 4.9 4.7 4.6    108 106   CO2 21 22 23   BUN 29* 32* 38*   CREATININE 1.9* 2.0* 2.1*   GLUCOSE 82 143* 124*     Hepatic:   Recent Labs     07/19/21  1600   AST 25   ALT 9*   BILITOT <0.2   ALKPHOS 179*       Ct of chest:    Trace pleural fluid is seen on the left.  A few bandlike opacities are seen,   likely subsegmental atelectasis or scar.  No pneumonia or edema.  No   pulmonary fibrosis           Problem List  Active Problems:    COPD exacerbation (HCC)  Resolved Problems:    * No resolved hospital problems. *       Assessment & Plan:   1. COPD:  She has been seen by pulmonary and CT of chest has been reviewed. she she does not require any supplemental oxygen. Steroids have been d/c ordered. Will need outpatient PFT's  2. CKD with hx of bladder cancer:  Nephrology to follow. She is not on any nephrotoxic agents  3. Iron deficiency Anemia with 150 lb weight loss: start IV venofer. Will need outpatient C scope and EGD   4. Hx of bipolar depression per chart review, however pt is not currently on any medications. 5. SW to follow and help with bed bug infestation at home   6.  Anticipate likely d/c home in the next 24 hours if creat stable Diet: ADULT DIET;  Regular  Code:Full Code  DVT PPX      WINDY Mack CNP   7/21/2021 8:08 AM

## 2021-07-21 NOTE — CONSULTS
P Pulmonary and Critical Care   Consult Note      Reason for Consult: COPD exacerbation  Requesting Physician: Dr. Adeola Altamirano:   [facility]:                The patient is a 64 y.o. female with significant past medical history of:      Diagnosis Date    Arthritis     Asthma     Bipolar 1 disorder ([address]     Cancer [facility]     bladder    COPD (chronic obstructive pulmonary disease) [facility]     Depression     controlled w/meds    Diabetes mellitus ([facility] [facility]     diet control    Hypertension     Neuropathy     toes    Obesity, unspecified     Osteoarthrosis, unspecified whether generalized or localized, lower leg     PVD (peripheral vascular disease) ([facility] [facility]     Tobacco use disorder     Unspecified cataract      Interval History: She is sitting in the chair this AM. She is tolerating RA and reports no new pulmonary problems.  She does have a significant anemia and is ordered iron infusion      Past Surgical History:        Procedure Laterality Date    ABDOMEN SURGERY      BLADDER REMOVAL      partial    CHOLECYSTECTOMY      COLONOSCOPY      CYSTOSCOPY  4-7-10    fulgeration of bladder tumor    CYSTOSCOPY  7/14/10    Cytology    CYSTOSCOPY  12/15/10    CYSTOSCOPY  3/2/11    COLLAGEN INJECTION    CYSTOSCOPY  6-    cytology    CYSTOSCOPY  10/19/11    CYSTOSCOPY  4/11/12    CYSTOSCOPY  10/8/12    cytology    CYSTOSCOPY  4/7/14    cysto/cytology    CYSTOSCOPY  04/29/2015    Cytology study    CYSTOSCOPY Right 1/16/2020    CYSTOSCOPY RIGHT URETERAL STENT REMOVAL, RIGHT RETROGRADE PYELOGRAM performed by Rony Rosario MD at [address] [place] [address] left eye    FEMUR FRACTURE SURGERY Left [id number]    GASTRIC BYPASS SURGERY      JOINT REPLACEMENT      left knee    OTHER SURGICAL HISTORY Left 10/24/2016    left nephroureterectomy    OTHER SURGICAL HISTORY  03/22/2017    RIGHT ureteral stent placement, RIGHT nephrostomy drain placement     TOTAL KNEE ARTHROPLASTY independently  Environmental/chemical exposure: None known    Family History:       Problem Relation Age of Onset    Heart Disease Brother     Anesth Problems Mother     Heart Disease Mother     Stroke Brother     Heart Disease Father      REVIEW OF SYSTEMS:    CONSTITUTIONAL:  negative for fevers, chills, diaphoresis, activity change, appetite change, fatigue, night sweats and unexpected weight change. EYES:  negative for blurred vision, eye discharge, visual disturbance and icterus  HEENT:  negative for hearing loss, tinnitus, ear drainage, sinus pressure, nasal congestion, epistaxis and snoring  RESPIRATORY:  See HPI  CARDIOVASCULAR:  negative for chest pain, palpitations, exertional chest pressure/discomfort, edema, syncope  GASTROINTESTINAL:  negative for nausea, vomiting, diarrhea, constipation, blood in stool and abdominal pain  GENITOURINARY:  negative for frequency, dysuria, urinary incontinence, decreased urine volume, and hematuria  HEMATOLOGIC/LYMPHATIC:  negative for easy bruising, bleeding and lymphadenopathy  ALLERGIC/IMMUNOLOGIC:  negative for recurrent infections, angioedema, anaphylaxis and drug reactions  ENDOCRINE:  negative for weight changes and diabetic symptoms including polyuria, polydipsia and polyphagia  MUSCULOSKELETAL:  negative for  pain, joint swelling, decreased range of motion and muscle weakness  NEUROLOGICAL:  negative for headaches, slurred speech, unilateral weakness  PSYCHIATRIC/BEHAVIORAL: negative for hallucinations, behavioral problems, confusion and agitation.      Objective:   PHYSICAL EXAM:      VITALS:  /66   Pulse 83   Temp 98.5 °F (36.9 °C) (Oral)   Resp 18   Ht 5' 2\" (1.575 m)   Wt 143 lb 4.8 oz (65 kg)   SpO2 96%   BMI 26.21 kg/m²      24HR INTAKE/OUTPUT:      Intake/Output Summary (Last 24 hours) at 7/21/2021 9264  Last data filed at 7/20/2021 1919  Gross per 24 hour   Intake 480 ml   Output --   Net 480 ml     CONSTITUTIONAL:  awake, alert, cooperative, no apparent distress, and appears stated age  NECK:  Supple, symmetrical, trachea midline, no adenopathy, thyroid symmetric, not enlarged and no tenderness, skin normal  LUNGS:  no increased work of breathing and diminished to auscultation. No accessory muscle use  CARDIOVASCULAR: S1 and S2, no edema and no JVD  ABDOMEN:  normal bowel sounds, non-distended and no masses palpated, and no tenderness to palpation. No hepatospleenomegaly  LYMPHADENOPATHY:  no axillary or supraclavicular adenopathy. No cervical adnenopathy  PSYCHIATRIC: Oriented to person place and time. No obvious depression or anxiety. MUSCULOSKELETAL: No obvious misalignment or effusion of the joints. No clubbing, cyanosis of the digits. SKIN:  normal skin color, texture, turgor and no redness, warmth, or swelling. No palpable nodules    DATA:    Old records have been reviewed    CBC:  Recent Labs     07/19/21  1600 07/20/21  0537 07/21/21  0557   WBC 6.6 3.5* 9.0   RBC 3.01* 2.73* 2.78*   HGB 7.9* 7.2* 7.1*   HCT 26.0* 22.7* 23.2*    250 277   MCV 86.4 83.2 83.4   MCH 26.2 26.5 25.7*   MCHC 30.4* 31.8 30.9*   RDW 17.3* 16.8* 17.3*      BMP:  Recent Labs     07/19/21  1600 07/20/21  0537 07/21/21  0557   * 137 138   K 4.9 4.7 4.6    108 106   CO2 21 22 23   BUN 29* 32* 38*   CREATININE 1.9* 2.0* 2.1*   CALCIUM 8.3 8.5 8.4   GLUCOSE 82 143* 124*      ABG:  No results for input(s): PHART, ANC0AIY, PO2ART, NAU2XUA, Y2CLKKHG, BEART in the last 72 hours. Procalcitonin  No results for input(s): PROCAL in the last 72 hours. Lab Results   Component Value Date    BNP 37 02/09/2010     Lab Results   Component Value Date    CKTOTAL 66 07/20/2021    TROPONINI 0.01 07/19/2021           Radiology Review:  All pertinent images / reports were reviewed as a part of this visit. Assessment:     1.  COPD exacerbation      Plan:     I have reviewed laboratories, medical records and images for this visit  Chest x-ray reveals hyperinflation and cardiomegaly. No acute infiltrate is noted. I suspect she has at least moderate to severe COPD/emphysema. Chest exam is clear  Stop steroids  CT chest is without worrisome findings. Continue Bevespi and Duoneb  She would benefit from OPT Pulmonary f/u. She can DC home when ok with the rest of the treatment team   I will sign off.

## 2021-07-22 NOTE — PLAN OF CARE
Problem: Breathing Pattern - Ineffective:  Goal: Ability to achieve and maintain a regular respiratory rate will improve  Description: Ability to achieve and maintain a regular respiratory rate will improve  7/22/2021 1155 by Yasmany Balderas RN  Outcome: Ongoing  7/22/2021 0121 by Sergey Mohamud RN  Outcome: Ongoing

## 2021-07-22 NOTE — PROGRESS NOTES
Patients IV infiltrated, patient a hard stick, PIV was placed by PICC nurse yesterday. Possible DC tomorrow. Notified hospitalist and asked if a midline would be needed, instructed to hold IV fluids for tonight and to encourage PO intake, and will check morning labs.

## 2021-07-22 NOTE — PROGRESS NOTES
Patient called this nurse to the room again saying she \"can't breathe\". Respiratory had weaned pt off O2. 97% on RA and still no wheezing. Placed on 2L for comfort. Message sent to hospitalist, PRN nebulizer ordered. PRN tylenol given for shoulder pain. Will continue to monitor.

## 2021-07-22 NOTE — PROGRESS NOTES
CLINICAL PHARMACY NOTE: MEDS TO BEDS    Total # of Prescriptions Filled: 4   The following medications were delivered to the patient:  · Iron  · Bevespi  · Duoneb  · Hydralazine    Additional Documentation:    Delivered to patient    Samantha Petty

## 2021-07-22 NOTE — PROGRESS NOTES
7/22/21 9:36 AM   [phone] Hospital or Facility: [facility] From: Jeannine Ramon RE: Bryan Chase RM: [id number] Pt has IV iron sucrose due this morning. She has no IV access. The peripheral placed by the PICC nurse infiltrated. Would you like to order PO iron instread? Thanks.  Need Callback: NO CALLBACK REQ 5T ONCOLOGY

## 2021-07-22 NOTE — DISCHARGE SUMMARY
[facility]    Patient Demographics    Patient. Valerie Sher  Date of Birth. 1945  MRN. [id number]     Primary care provider. No primary care provider on file. (Tel: None)    Admit date: 2021    Discharge date 2021   Note Date: 2021     Reason for Hospitalization. Chief Complaint   Patient presents with    Shortness of Breath     pt arrived via ems with shortness of breath and productive cough. Has hx of emphysema. Pt has several bedbugs on clothes and body. Several bites noted on body. Significant Findings. Active Problems:    COPD exacerbation ([address]  Resolved Problems:    * No resolved hospital problems. *       Problems and results from this hospitalization that need follow up. 1. Severe Anemia with 150 lb weight loss: needs follow up with GI and PCP  2. CKD: Follow up with nephrology     Significant test results and incidental findings. 1.     Invasive procedures and treatments. [facility] Course. The patient was admitted with shortness of breath. She was followed by pulmonary, GI and nephrology. She was treated for the followin. COPD:  She has been seen by pulmonary and CT of chest did not show any acute findings. she did not require any supplemental oxygen. she was treated with nebs and her dyspnea improved within 24 hours. She  Will need outpatient PFT's  2. SANAM on CKD stage 3 advancing:  She was given IV hydration and followed by nephrology. Creat was 1.8 on day of d/c . ACE was d/c and hydralazine was added for BP control. R/o Paraproteinemia due to uncontrolled anemia and and renal failure. She needs out patient follow up   3. Iron deficiency Anemia with 150 lb weight loss: she received IV venofer. She will need outpatient C scope and EGD with GI. Hgb was 7.0 on the day of d/c.   She did not require any transfusion. 4. SW followed regarding  bed bug infestation at home . Will set up potential extermination,however her children are currently not willing to enter into her mobile home to assist with extermination. The patient does have a  with [employer] and is receiving home care services. Consults. IP CONSULT TO NEPHROLOGY  IP CONSULT TO PULMONOLOGY  IP CONSULT TO GI    Physical examination on discharge day. BP (!) 109/55   Pulse 89   Temp 97.7 °F (36.5 °C) (Oral)   Resp 20   Ht 5' 2\" (1.575 m)   Wt 143 lb 4.8 oz (65 kg)   SpO2 97%   BMI 26.21 kg/m²   General appearance. Alert. Looks comfortable. HEENT. Sclera clear. Moist mucus membranes. Cardiovascular. Regular rate and rhythm, normal S1, S2. No murmur. Respiratory. Not using accessory muscles. Clear to auscultation bilaterally, no wheeze. Gastrointestinal. Abdomen soft, non-tender, not distended, normal bowel sounds  Neurology. Facial symmetry. No speech deficits. Moving all extremities equally. Extremities. No edema in lower extremities. Skin. Warm, dry, normal turgor    Condition at time of discharge stable    Medication instructions provided to patient at discharge. Medication List      START taking these medications    ferrous sulfate 325 (65 Fe) MG tablet  Commonly known as: IRON 325  Take 1 tablet by mouth 2 times daily (with meals)     glycopyrrolate-formoterol 9-4.8 MCG/ACT Aero  Commonly known as: BEVESPI  Inhale 2 puffs into the lungs 2 times daily     hydrALAZINE 25 MG tablet  Commonly known as: APRESOLINE  Take 1 tablet by mouth every 8 hours        CONTINUE taking these medications    acetaminophen 500 MG tablet  Commonly known as: TYLENOL     albuterol sulfate  (90 Base) MCG/ACT inhaler  Use 2 puffs 4 times daily for 7 days then as needed for wheezing. Dispense with Spacer and instruct in use. At patient's preference may use 60 dose MDI. May Sub Pro-Air or Proventil as needed per insurance. ipratropium-albuterol 0.5-2.5 (3) MG/3ML Soln nebulizer solution  Commonly known as: DUONEB  Inhale 3 mLs into the lungs every 4 hours as needed for Shortness of Breath     vitamin B-12 1000 MCG extended release tablet  Take 1 tablet by mouth daily           Where to Get Your Medications      These medications were sent to [facility], [address] [place] [address]    Phone: [phone]   · ferrous sulfate 325 (65 Fe) MG tablet  · glycopyrrolate-formoterol 9-4.8 MCG/ACT Aero  · hydrALAZINE 25 MG tablet  · ipratropium-albuterol 0.5-2.5 (3) MG/3ML Soln nebulizer solution         Discharge recommendations given to patient. Follow Up. in 1 week   Disposition. home  Activity as tolerated  Diet: ADULT DIET; Regular      Spent > 30 minutes in discharge process.     Signed:  WINDY Schaefer CNP     7/22/2021 11:18 AM

## 2021-07-22 NOTE — PROGRESS NOTES
MD Fady Barrera MD Marten Gammons, MD               Office: [phone]                      Fax: [phone]             [facility]                   NEPHROLOGY INPATIENT PROGRESS NOTE:     PATIENT NAME: June Boothe  : 1945  MRN: [id number]       RECOMMENDATIONS:   - stop IVF   -will need renal imaging - can be done outpt  -No need for tight BP control, but okay with hydralazine    - Lisinopril - stopped    -Monitor BP with Solu-Medrol, COPD treatment    - r/o paraproteinemia -due to uncontrolled anemia and renal failure    -Has been ordered, in process, for final read, follow-up as an outpatient,  - reviewed iron stores - very low: RX IV Iron -> PO Fe on d/c   - FOBT (-) : but might need Endoscopy d/to severe anemia + weight loss-as per GI, outpatient EGD and colonoscopy,    -Smoking cessation  -'s assistance with bedbug issues    D/C plan from renal stand point: ok for d/c   : f/u w/ me at [address] in 1 week after d/c.  (I will arrange.),  I have given her my card for follow-up      IMPRESSION:       Admitted for:  COPD exacerbation ([facility] [address] [J44.1]      SANAM (on CKD - 3 advancing):  Severe:   - Oligouric as incontinence   - BL CKD (but no previous renal out pt f/up reported) --> Scr ~ 1.4-1.5 --> to 1.8 in 2019 ---> 2.4 on admission in  --> improved to 1.8 --> 1.9->2.0 this time   - so SANAM vs advancing CKD   : Etiology of SANAM - presumed pre-renal for now, + recent history of hydronephrosis  : UA results reviewed = (+) Nitrates, bacte; but (-) LE, WBC minimal 3-5    = so unlikely UTI - monitor for now       History of bladder cancer,   Unclear if she had any treatment,      Associated problems:   - Volume status: eu-volemic, although BNP 4000s  CXR - No radiographic evidence of acute pulmonary disease.     : BP: HTN -needs better control  : Na: Hyponatremia-due to SANAM    - Azotemia: Prerenal likely   - Electrolytes: K: WNL  - Acid-Base: WNL  - Anemia: Mild, worsening, needs follow-up      Other major problems: Management per primary and other consulting teams.   //Bedbugs issue  //COPD exacerbation  //Smoker    Hospital Problems         Last Modified POA    COPD exacerbation ([facility] [address] 7/19/2021 Yes             Please refer to the orders. High Complexity. Multiple complex problems. Discussed with patient and treatment team-referring WINDY Garcia CNP    Time spent > 30 (~35) minutes. Thank you for allowing me to participate in this patient's care. Please do not hesitate to contact me anytime. We will follow along with you. Nidia Moreno MD,  [facility] [address] [place]: [phone] or Via [employer]  Fax: [phone]        ========================================================   ========================================================   Subjective:  Seen resting in bed   No current active complaints. Patient denied chest pain / dizziness/lightheadedness/syncope/ SOB / leg edema. Past medical, Surgical, Social, Family medical history reviewed by me. MEDICATIONS: reviewed by me. Medications Prior to Admission:  No current facility-administered medications on file prior to encounter. Current Outpatient Medications on File Prior to Encounter   Medication Sig Dispense Refill    albuterol sulfate  (90 Base) MCG/ACT inhaler Use 2 puffs 4 times daily for 7 days then as needed for wheezing. Dispense with Spacer and instruct in use. At patient's preference may use 60 dose MDI. May Sub Pro-Air or Proventil as needed per insurance.  1 Inhaler 0    ipratropium-albuterol (DUONEB) 0.5-2.5 (3) MG/3ML SOLN nebulizer solution Inhale 3 mLs into the lungs every 4 hours as needed for Shortness of Breath 360 mL 0    Cyanocobalamin (VITAMIN B-12) 1000 MCG extended release tablet Take 1 tablet by mouth daily 90 tablet 3    acetaminophen (TYLENOL) 500 MG tablet Take 500 mg by mouth every 6 hours as needed for Pain.            Current Facility-Administered Medications:     iron sucrose (VENOFER) 200 mg in sodium chloride 0.9 % 100 mL IVPB, 200 mg, Intravenous, Q24H, WINDY Garcia CNP, Stopped at 07/21/21 1542    albuterol (PROVENTIL) nebulizer solution 2.5 mg, 2.5 mg, Nebulization, Q6H PRN, WINDY Christian CNP    glycopyrrolate-formoterol (BEVESPI) 9-4.8 MCG/ACT inhaler 2 puff, 2 puff, Inhalation, BID, Gabbi Bradshaw MD, 2 puff at 07/21/21 1950    0.9 % sodium chloride infusion, , Intravenous, Continuous, Tiffany Castillo MD, Last Rate: 100 mL/hr at 07/21/21 1600, Restarted at 07/21/21 1600    ipratropium-albuterol (DUONEB) nebulizer solution 1 ampule, 1 ampule, Inhalation, Q4H WA, Tahira Cabrera MD, 1 ampule at 07/22/21 0940    sodium chloride flush 0.9 % injection 5-40 mL, 5-40 mL, Intravenous, 2 times per day, Tahira Cabrera MD, 10 mL at 07/20/21 2131    sodium chloride flush 0.9 % injection 5-40 mL, 5-40 mL, Intravenous, PRN, Tahira Cabrera MD    0.9 % sodium chloride infusion, 25 mL, Intravenous, PRN, Tahira Cabrera MD, Held at 07/22/21 6511    enoxaparin (LOVENOX) injection 30 mg, 30 mg, Subcutaneous, Daily, Ne Ruiz MD, 30 mg at 07/22/21 0920    ondansetron (ZOFRAN-ODT) disintegrating tablet 4 mg, 4 mg, Oral, Q8H PRN **OR** ondansetron (ZOFRAN) injection 4 mg, 4 mg, Intravenous, Q6H PRN, Tahira Cabrera MD    polyethylene glycol (GLYCOLAX) packet 17 g, 17 g, Oral, Daily PRN, Tahira Cabrera MD    acetaminophen (TYLENOL) tablet 650 mg, 650 mg, Oral, Q6H PRN, 650 mg at 07/22/21 0550 **OR** acetaminophen (TYLENOL) suppository 650 mg, 650 mg, Rectal, Q6H PRN, Tahira Cabrera MD    hydrALAZINE (APRESOLINE) injection 10 mg, 10 mg, Intravenous, Q4H PRN, Tahira Cabrera MD    hydrALAZINE (APRESOLINE) tablet 25 mg, 25 mg, Oral, 3 times per day, Tahira Cabrera MD, 25 mg at 07/22/21 0550    REVIEW OF SYSTEMS:  As mentioned in chief complaint and HPI , Subjective     PHYSICAL EXAM:  Recent vital signs and recent I/Os reviewed by me. Wt Readings from Last 3 Encounters:   07/20/21 143 lb 4.8 oz (65 kg)   09/18/20 135 lb (61.2 kg)   09/13/20 140 lb (63.5 kg)     BP Readings from Last 3 Encounters:   07/22/21 (!) 109/55   09/19/20 (!) 139/56   09/13/20 (!) 156/47     Patient Vitals for the past 24 hrs:   BP Temp Temp src Pulse Resp SpO2   07/22/21 0918 (!) 109/55 97.7 °F (36.5 °C) Oral 89 20 97 %   07/22/21 0544 122/68 98.4 °F (36.9 °C) Oral 80 18 96 %   07/21/21 2329 116/62 98.5 °F (36.9 °C) Oral 94 -- 96 %   07/21/21 2044 113/65 98.5 °F (36.9 °C) Oral 94 16 96 %   07/21/21 1950 -- -- -- -- 16 98 %   07/21/21 1930 -- -- -- -- -- 95 %   07/21/21 1929 -- -- -- -- -- 96 %   07/21/21 1736 118/63 98.9 °F (37.2 °C) Oral 97 16 96 %   07/21/21 1430 136/67 -- -- 96 -- --   07/21/21 1048 (!) 110/57 97.5 °F (36.4 °C) Axillary 95 16 98 %   07/21/21 1030 110/65 98.6 °F (37 °C) Oral 80 16 97 %       Intake/Output Summary (Last 24 hours) at 7/22/2021 0957  Last data filed at 7/22/2021 9736  Gross per 24 hour   Intake 1940.62 ml   Output --   Net 1940.62 ml          Physical Exam  Vitals reviewed. Constitutional:       General: She is not in acute distress. Appearance: Normal appearance. She is ill-appearing. HENT:      Head: Normocephalic and atraumatic. Right Ear: External ear normal.      Left Ear: External ear normal.      Nose: Nose normal.      Mouth/Throat:      Mouth: Mucous membranes are moist. Mucous membranes are not dry. Eyes:      General: No scleral icterus. Conjunctiva/sclera: Conjunctivae normal.   Neck:      Vascular: No JVD. Cardiovascular:      Rate and Rhythm: Normal rate and regular rhythm. Heart sounds: S1 normal and S2 normal.   Pulmonary:      Effort: Pulmonary effort is normal. No respiratory distress. Breath sounds: Normal breath sounds. Abdominal:      General: Bowel sounds are normal. There is no distension. Musculoskeletal:         General: No swelling or deformity. Cervical back: Normal range of motion and neck supple. Skin:     General: Skin is dry. Coloration: Skin is not jaundiced. Neurological:      Mental Status: She is alert and oriented to person, place, and time. Mental status is at baseline. Psychiatric:         Mood and Affect: Mood normal.         Behavior: Behavior normal.           DATA:  Diagnostic tests reviewed by me for today's visit:   (AS NEEDED FOR MY EVALUATION AND MANAGEMENT). Recent Labs     07/19/21  1600 07/20/21  0537 07/21/21  0557 07/22/21  0601   WBC 6.6 3.5* 9.0 6.7   HCT 26.0* 22.7* 23.2* 21.9*    250 277 255     Iron Saturation:  No components found for: PERCENTFE  FERRITIN:    Lab Results   Component Value Date    FERRITIN 15.2 07/20/2021     IRON:    Lab Results   Component Value Date    IRON 16 07/20/2021     TIBC:    Lab Results   Component Value Date    TIBC 287 07/20/2021       Recent Labs     07/19/21  1600 07/20/21  0537 07/21/21  0557 07/22/21  0601   * 137 138 134*   K 4.9 4.7 4.6 4.2    108 106 104   CO2 21 22 23 20*   BUN 29* 32* 38* 46*   CREATININE 1.9* 2.0* 2.1* 1.8*     Recent Labs     07/19/21  1600 07/20/21  0537 07/21/21  0557 07/22/21  0601   CALCIUM 8.3 8.5 8.4 8.0*   PHOS  --   --  4.4 3.7     No results for input(s): PH, PCO2, PO2 in the last 72 hours.     Invalid input(s): [place]    ABG:  No results found for: PH, PCO2, PO2, HCO3, BE, THGB, TCO2, O2SAT  VBG:    Lab Results   Component Value Date    PHVEN 7.411 06/14/2020    YRO1RGF 37.6 06/14/2020    BEVEN -0.6 06/14/2020    E2MAMIPY 98 06/14/2020       LDH:    Lab Results   Component Value Date     09/07/2020     Uric Acid:    Lab Results   Component Value Date    LABURIC 7.3 07/20/2021       PT/INR:    Lab Results   Component Value Date    PROTIME 10.4 06/25/2020    INR 0.90 06/25/2020     Warfarin PT/INR:  No components found for: DORA, PTINRWAR  PTT:    Lab Results   Component Value Date    APTT 26.3 06/25/2020   [APTT}  Last 3 Troponin:    Lab Results   Component Value Date    TROPONINI 0.01 07/19/2021    TROPONINI <0.01 09/18/2020    TROPONINI <0.01 09/07/2020       U/A:    Lab Results   Component Value Date    COLORU Yellow 07/20/2021    PROTEINU Negative 07/20/2021    PHUR 5.5 07/20/2021    WBCUA 3-5 07/20/2021    RBCUA None seen 07/20/2021    YEAST none 08/12/2016    BACTERIA 4+ 07/20/2021    CLARITYU SLCLOUDY 07/20/2021    SPECGRAV 1.020 07/20/2021    LEUKOCYTESUR Negative 07/20/2021    UROBILINOGEN 0.2 07/20/2021    BILIRUBINUR Negative 07/20/2021    BLOODU Negative 07/20/2021    GLUCOSEU Negative 07/20/2021     Microalbumen/Creatinine ratio:  No components found for: RUCREAT  24 Hour Urine for Protein:  No components found for: RAWUPRO, UHRS3, EMLQ45NC, UTV3  24 Hour Urine for Creatinine Clearance:  No components found for: CREAT4, UHRS10, UTV10  Urine Toxicology:  No components found for: Moriah Ricardo, IBENZO, ICOCAINE, IMARTHC, IOPIATES, IPHENCYC    HgBA1c:    Lab Results   Component Value Date    LABA1C 5.1 03/07/2018     RPR:  No results found for: RPR  HIV:  No results found for: HIV  MEHUL:    Lab Results   Component Value Date    MEHUL Negative 08/10/2011     RF:  No results found for: RF  DSDNA:  No components found for: DNA  AMYLASE:    Lab Results   Component Value Date    AMYLASE 50 06/14/2020     LIPASE:    Lab Results   Component Value Date    LIPASE 46.0 06/14/2020     Fibrinogen Level:  No components found for: FIB       BELOW MENTIONED RADIOLOGY STUDY RESULTS BY ME (AS NEEDED FOR MY EVALUATION AND MANAGEMENT).      XR CHEST PORTABLE    Result Date: 7/19/2021  EXAMINATION: ONE XRAY VIEW OF THE CHEST 7/19/2021 3:28 pm COMPARISON: Chest x-ray dated 09/18/2020 HISTORY: ORDERING SYSTEM PROVIDED HISTORY: SOB TECHNOLOGIST PROVIDED HISTORY: Reason for exam:->SOB Reason for Exam: SOB Acuity: Acute Type of Exam: Initial FINDINGS: HEART/MEDIASTINUM: The cardiomediastinal silhouette is stable. PLEURA/LUNGS: There are no focal consolidations or pleural effusions. There is no appreciable pneumothorax. BONES/SOFT TISSUE: No acute abnormality. No radiographic evidence of acute pulmonary disease.

## 2021-07-22 NOTE — PROGRESS NOTES
Discharge paperwork reviewed and signed. Pt had no IV access. Pt discharged off floor via wheelchair.

## 2021-07-22 NOTE — PLAN OF CARE
Problem: Breathing Pattern - Ineffective:  Goal: Ability to achieve and maintain a regular respiratory rate will improve  Description: Ability to achieve and maintain a regular respiratory rate will improve  Outcome: Ongoing     Problem: Skin Integrity:  Goal: Will show no infection signs and symptoms  Description: Will show no infection signs and symptoms  Outcome: Ongoing  Goal: Absence of new skin breakdown  Description: Absence of new skin breakdown  Outcome: Ongoing     Problem: Pain:  Goal: Pain level will decrease  Description: Pain level will decrease  Outcome: Ongoing  Goal: Control of acute pain  Description: Control of acute pain  Outcome: Ongoing  Goal: Control of chronic pain  Description: Control of chronic pain  Outcome: Ongoing

## 2021-07-24 NOTE — ED NOTES
Daughter, Bishop Saumya called to  patient. Waiting for return call.       Bell Bueno RN  07/24/21 [phone] Tyrel Goodwin RN  07/24/21 [id number]

## 2021-07-24 NOTE — ED PROVIDER NOTES
[facility]        Pt Name: Chelly Fernandez  MRN: [id number]  [place] 1945  Date of evaluation: 7/24/2021  Provider: Daquan Coffman PA-C  PCP: No primary care provider on file. Note Started: 2:14 PM EDT        I have seen and evaluated this patient with my supervising physician iCndi Gee MD.    [address] [place] [address]       Chief Complaint   Patient presents with    Shortness of Breath       HISTORY OF PRESENT ILLNESS   (Location, Timing/Onset, Context/Setting, Quality, Duration, Modifying Factors, Severity, Associated Signs and Symptoms)  Note limiting factors. Chief Complaint: RON Fernandez is a 64 y.o. female who presents for shortness of breath. Patient states that she was discharged home yesterday after being admitted for COPD exacerbation. She is that she is not sure whether or not she was discharged home with steroids. She states that she is not sure whether or not she tried taking any breathing treatments before calling 911 this afternoon. She states that she feels fine now. States that she has never had a panic attack before but symptoms came on right after she realized that she cannot find her phone. She does live alone. She denies cough congestion runny nose. No fevers or chills. Of note, she is comforted bedbugs. States that she has not been able to get rid of these. She states that she still feels comfortable living at home by herself. She has no other complaints or concerns at this time. Nursing Notes were all reviewed and agreed with or any disagreements were addressed in the HPI. REVIEW OF SYSTEMS    (2-9 systems for level 4, 10 or more for level 5)     Review of Systems   Constitutional: Negative for appetite change, chills and fever. HENT: Negative for congestion and rhinorrhea. Respiratory: Positive for shortness of breath. Negative for cough and wheezing.     Cardiovascular: Negative for chest pain. Gastrointestinal: Negative for abdominal pain, diarrhea, nausea and vomiting. Genitourinary: Negative for difficulty urinating, dysuria and hematuria. Musculoskeletal: Negative for neck pain and neck stiffness. Skin: Negative for rash. Neurological: Negative for headaches. Positives and Pertinent negatives as per HPI. Except as noted above in the ROS, all other systems were reviewed and negative.        PAST MEDICAL HISTORY     Past Medical History:   Diagnosis Date    Arthritis     Asthma     Bipolar 1 disorder ([facility]     Cancer ([facility]     bladder    COPD (chronic obstructive pulmonary disease) ([facility]     Depression     controlled w/meds    Diabetes mellitus ([facility]     diet control    Hypertension     Neuropathy     toes    Obesity, unspecified     Osteoarthrosis, unspecified whether generalized or localized, lower leg     [facility]     Tobacco use disorder     Unspecified cataract          SURGICAL HISTORY     Past Surgical History:   Procedure Laterality Date    ABDOMEN SURGERY      BLADDER REMOVAL      partial    CHOLECYSTECTOMY      COLONOSCOPY      CYSTOSCOPY  4-7-10    fulgeration of bladder tumor    CYSTOSCOPY  7/14/10    Cytology    CYSTOSCOPY  12/15/10    CYSTOSCOPY  3/2/11    COLLAGEN INJECTION    CYSTOSCOPY  6-    cytology    CYSTOSCOPY  10/19/11    CYSTOSCOPY  4/11/12    CYSTOSCOPY  10/8/12    cytology    CYSTOSCOPY  4/7/14    cysto/cytology    CYSTOSCOPY  04/29/2015    Cytology study    CYSTOSCOPY Right 1/16/2020    CYSTOSCOPY RIGHT URETERAL STENT REMOVAL, RIGHT RETROGRADE PYELOGRAM performed by Pura Gaytan MD at [address] [place] [address] left eye    FEMUR FRACTURE SURGERY Left [id number]    GASTRIC BYPASS SURGERY      JOINT REPLACEMENT      left knee    OTHER SURGICAL HISTORY Left 10/24/2016    left nephroureterectomy    OTHER SURGICAL HISTORY  03/22/2017    RIGHT ureteral stent placement, RIGHT nephrostomy drain placement     TOTAL KNEE ARTHROPLASTY Left          CURRENTMEDICATIONS       Previous Medications    ACETAMINOPHEN (TYLENOL) 500 MG TABLET    Take 500 mg by mouth every 6 hours as needed for Pain. ALBUTEROL SULFATE  (90 BASE) MCG/ACT INHALER    Use 2 puffs 4 times daily for 7 days then as needed for wheezing. Dispense with Spacer and instruct in use. At patient's preference may use 60 dose MDI. May Sub Pro-Air or Proventil as needed per insurance.     CYANOCOBALAMIN (VITAMIN B-12) 1000 MCG EXTENDED RELEASE TABLET    Take 1 tablet by mouth daily    FERROUS SULFATE (IRON 325) 325 (65 FE) MG TABLET    Take 1 tablet by mouth 2 times daily (with meals)    GLYCOPYRROLATE-FORMOTEROL (BEVESPI) 9-4.8 MCG/ACT AERO    Inhale 2 puffs into the lungs 2 times daily    HYDRALAZINE (APRESOLINE) 25 MG TABLET    Take 1 tablet by mouth every 8 hours    IPRATROPIUM-ALBUTEROL (DUONEB) 0.5-2.5 (3) MG/3ML SOLN NEBULIZER SOLUTION    Inhale 3 mLs into the lungs every 4 hours as needed for Shortness of Breath         ALLERGIES     Aspirin, Ibuprofen, Adhesive tape, Hydrochlorothiazide, Microderm base [albolene], Nsaids, Voltaren [diclofenac sodium], and Procardia [nifedipine]    FAMILYHISTORY       Family History   Problem Relation Age of Onset    Heart Disease Brother     Anesth Problems Mother     Heart Disease Mother     Stroke Brother     Heart Disease Father           SOCIAL HISTORY       Social History     Tobacco Use    Smoking status: Former Smoker     Packs/day: 1.00     Years: 55.00     Pack years: 55.00     Types: Cigarettes    Smokeless tobacco: Never Used    Tobacco comment: QUIT 6 MONTHS AGO   Vaping Use    Vaping Use: Never used   Substance Use Topics    Alcohol use: Not Currently     Alcohol/week: 0.0 standard drinks    Drug use: No       SCREENINGS             PHYSICAL EXAM    (up to 7 for level 4, 8 or more for level 5)     ED Triage Vitals   BP Temp Temp src Pulse Resp SpO2 Height Weight   -- -- -- -- -- -- -- --       Physical Exam  Vitals and nursing note reviewed. Constitutional:       Appearance: She is well-developed. She is not diaphoretic. HENT:      Head: Normocephalic and atraumatic. Right Ear: External ear normal.      Left Ear: External ear normal.      Nose: Nose normal.   Eyes:      General:         Right eye: No discharge. Left eye: No discharge. Cardiovascular:      Rate and Rhythm: Normal rate and regular rhythm. Heart sounds: Normal heart sounds. Pulmonary:      Effort: Pulmonary effort is normal. No respiratory distress. Breath sounds: Normal breath sounds. No wheezing, rhonchi or rales. Chest:      Chest wall: No tenderness. Abdominal:      General: There is no distension. Palpations: Abdomen is soft. Tenderness: There is no abdominal tenderness. Musculoskeletal:         General: Normal range of motion. Cervical back: Normal range of motion and neck supple. Skin:     General: Skin is warm and dry. Neurological:      Mental Status: She is alert and oriented to person, place, and time.    Psychiatric:         Behavior: Behavior normal.         DIAGNOSTIC RESULTS   LABS:    Labs Reviewed   CBC WITH AUTO DIFFERENTIAL - Abnormal; Notable for the following components:       Result Value    RBC 2.71 (*)     Hemoglobin 7.0 (*)     Hematocrit 23.1 (*)     MCH 25.7 (*)     MCHC 30.3 (*)     RDW 17.0 (*)     Lymphocytes Absolute 0.8 (*)     Eosinophils Absolute 1.1 (*)     All other components within normal limits    Narrative:     Performed at:  [facility]  [address],  [place], [facility] [address]   Phone [phone]   COMPREHENSIVE METABOLIC PANEL - Abnormal; Notable for the following components:    Glucose 137 (*)     BUN 35 (*)     CREATININE 1.9 (*)     GFR Non- 26 (*)     GFR  31 (*)     Albumin 3.1 (*)     Albumin/Globulin Ratio 0.8 (*) Alkaline Phosphatase 140 (*)     All other components within normal limits    Narrative:     Performed at:  [facility] [address], [address] [employer]   Phone [phone]   TROPONIN - Abnormal; Notable for the following components:    Troponin 0.02 (*)     All other components within normal limits    Narrative:     Performed at:  [facility] [address],  [place], [address] [employer]   Phone 21  - Abnormal; Notable for the following components:    Pro-BNP 4,374 (*)     All other components within normal limits    Narrative:     Performed at:  [facility] [address], [address]   Phone [phone]   APTT - Abnormal; Notable for the following components:    aPTT 23.4 (*)     All other components within normal limits    Narrative:     Performed at:  [facility] [address], [address] [employer]   Phone [phone]   BLOOD GAS, VENOUS - Abnormal; Notable for the following components:    pH, [place] 7.347 (*)     pCO2, [place] 38.8 (*)     pO2, [place] 155.0 (*)     HCO3, Venous 21.3 (*)     Base Excess, [place] -4.0 (*)     Carboxyhemoglobin 3.7 (*)     All other components within normal limits    Narrative:     Performed at:  [facility] [address],  [place], [address]   Phone [phone]   TROPONIN - Abnormal; Notable for the following components:    Troponin 0.02 (*)     All other components within normal limits    Narrative:     Performed at:  [facility] [address], [address] [employer]   Phone [phone]   CULTURE, BLOOD 1   CULTURE, BLOOD 2   PROTIME-INR    Narrative:     Performed at:  [facility] [address] [facility] [address],  [place], [address] [employer]   Phone [phone]   LACTATE, SEPSIS    Narrative:     Performed at:  [facility] [address] [place], [address]   Phone [phone]   LACTATE, SEPSIS       When ordered only abnormal lab results are displayed. All other labs were within normal range or not returned as of this dictation. EKG: When ordered, EKG's are interpreted by the Emergency Department Physician in the absence of a cardiologist.  Please see their note for interpretation of EKG. RADIOLOGY:   Non-plain film images such as CT, Ultrasound and MRI are read by the radiologist. Plain radiographic images are visualized and preliminarily interpreted by the ED Provider with the below findings:        Interpretation per the Radiologist below, if available at the time of this note:    XR CHEST PORTABLE   Final Result   No acute cardiopulmonary disease. CT CHEST WO CONTRAST    Result Date: 7/20/2021  EXAMINATION: CT OF THE CHEST WITHOUT CONTRAST 7/20/2021 2:17 pm TECHNIQUE: CT of the chest was performed without the administration of intravenous contrast. Multiplanar reformatted images are provided for review. Dose modulation, iterative reconstruction, and/or weight based adjustment of the mA/kV was utilized to reduce the radiation dose to as low as reasonably achievable. COMPARISON: None. HISTORY: ORDERING SYSTEM PROVIDED HISTORY: dyspnea TECHNOLOGIST PROVIDED HISTORY: Reason for exam:->dyspnea Reason for Exam: dyspnea Acuity: Acute Type of Exam: Initial FINDINGS: Mediastinum: Hypodense nodule seen in right lobe of thyroid measuring 9 mm in short axis. No specific imaging follow-up recommended based on size. Atherosclerotic change is seen the in aorta. Aortic valve calcification is seen. Mitral annulus calcification is seen. Mild coronary artery calcification is seen. Ascending aorta measures 3.7 cm. No significant pulmonary artery enlargement. Small hiatal hernia seen.   There is nonspecific thickening at the GE junction Lungs/pleura: No obstructing endobronchial the following medications:  Medications   ipratropium-albuterol (DUONEB) nebulizer solution 1 ampule (1 ampule Inhalation Given 7/24/21 1444)   methylPREDNISolone sodium (SOLU-MEDROL) injection 125 mg (125 mg Intravenous Given 7/24/21 1441)           Patient presents for evaluation of shortness of breath. On exam, he is resting comfortably in bed no acute. Vitals are stable and she is afebrile. She satting 98% on room air. Lungs are clear to auscultation bilaterally, chest is nontender and abdomen is benign. She was given Solu-Medrol in DuoNeb for symptomatic relief due to history of COPD and will be reevaluated. Please see attending note for EKG interpretation. CBC and CMP are unremarkable. She has chronic kidney disease with creatinine 1.9. Troponin is mildly elevated at 0.02, consistent with baseline. Delta troponin remained at 0.02 with no improvement. BNP 4374. Coags within normal limits. Lactic acid 1.6. Blood cultures are pending. Chest x-ray shows no acute cardiopulmonary disease process. No hypoxia with ambulation in the ED. I believe symptoms likely related to a stress reaction/anxiety state. She was just admitted for COPD exacerbation has resources available at home. Patient's oxygen saturations are good. I do not believe the patient's symptoms today are due to pulmonary embolism, pulmonary edema, pneumonia, pneumothorax, ACS, CHF, status asthmaticus, acute respiratory failure, profound anemia or metabolic abnormality, amongst other more emergent diagnostic considerations. Patient was advised to immediately return to emergency department if symptoms worsen. She is agreeable to this plan and stable for discharge at this time. FINAL IMPRESSION      1. Shortness of breath    2.  Anxiety state          DISPOSITION/PLAN   DISPOSITION Decision To Discharge 07/24/2021 06:03:00 PM      PATIENT REFERRED TO:  [facility] Emergency Department  [address] [facility] [address]  [phone]  Go to   If symptoms worsen    [facility]) Pre-Services  [phone]          DISCHARGE MEDICATIONS:  New Prescriptions    No medications on file       DISCONTINUED MEDICATIONS:  Discontinued Medications    No medications on file              (Please note that portions of this note were completed with a voice recognition program.  Efforts were made to edit the dictations but occasionally words are mis-transcribed.)    Mariana Sarabia PA-C (electronically signed)           Dinesh Quintana PA-C  07/24/21 [id number]

## 2021-07-24 NOTE — ED NOTES
Call made to Cyril Guevara, daughter of patient again. No answer. Voicemail left.       Izaiah Aquino RN  07/24/21 9982

## 2021-07-24 NOTE — ED PROVIDER NOTES
I independently performed a history and physical on Brenda Gray. All diagnostic, treatment, and disposition decisions were made by myself in conjunction with the advanced practice provider. Briefly, this is a 64 y.o. female here for shortness of breath. Tells me that she has been short of breath since discharge yesterday. She was recently admitted to the hospital for COPD exacerbation. Has been having the shortness of breath until today when she decided to come to the emergency room. She denies any chest pain. No fever or chills. No significant cough. Used a nebulizer treatment yesterday evening and this morning without great improvement. During my initial evaluation, the patient had already received a DuoNeb treatment. She states that she is feeling much better now. .    On exam,   General: Patient is in no acute distress  Skin: No cyanosis  HEENT: Moist mucous membranes  Heart: Regular rate, regular rhythm  Lung: No respiratory distress, clear to auscultation bilaterally, speaking full sentences  Abdomen: Soft, nontender  Neuro: Moving all extremities, no facial droop, no slurred speech, answers questions appropriately        EKG  The Ekg interpreted by me in the absence of a cardiologist shows. Normal sinus rhythm  No acute ST changes   HR 84  No significant EKG changes compared to study in 7/21      Screenings            MDM  Briefly, this is a 64 y.o. female here for shortness of breath. Given great response after DuoNeb treatment, likely secondary to COPD. She was recently admitted to this hospital and discharged yesterday for this issue. We ambulated the patient after DuoNeb treatment and she had no significant dyspnea, was speaking in full sentences, and saturating well on room air. EKG shows no acute ischemic changes. Low suspicion for ACS. Has no chest pain.   Does have elevated troponin however is frequently elevated in the past.  Likely secondary to history of chronic kidney disease. Obtain delta troponin which was normal.  Since the patient had such good improvement after nebulizer treatment, I have low suspicion that this is secondary to ACS. Will discharge the patient home with steroid for COPD. Ambulated well without hypoxia    Patient Referrals:  [facility] Emergency Department  [address] [facility]  [address] [facility] [address] [facility]  Go to   If symptoms worsen    [employer] Pre-Services  [phone]          Discharge Medications:  New Prescriptions    PREDNISONE (DELTASONE) 20 MG TABLET    Take 2 tablets by mouth daily for 5 days       FINAL IMPRESSION  1. Shortness of breath    2. Anxiety state        Blood pressure (!) 132/58, pulse 84, temperature 98.8 °F (37.1 °C), temperature source Oral, resp. rate 23, SpO2 94 %. For further details of [facility] emergency department encounter, please see documentation by advanced practice provider, Sharon Wang.         Haily King MD  07/24/21 2025

## 2021-07-26 NOTE — PROGRESS NOTES
[facility] that pt. Had a positive blood culture showing gram positive cocci coag negative staph. Dr. Guillermo Pulido here and saw pt. At her last visit where cultures were obtained. Dr. Guillermo Pulido reviewed chart and states no further orders at present.

## 2021-07-26 NOTE — TELEPHONE ENCOUNTER
Nurse Access contacted [facility] to assist patient with establishing care. Spoke with Carry, She stated understanding.

## 2021-07-28 LAB
BLOOD CULTURE, ROUTINE: NORMAL
CULTURE, BLOOD 2: ABNORMAL
CULTURE, BLOOD 2: ABNORMAL
ORGANISM: ABNORMAL
ORGANISM: ABNORMAL

## 2021-07-29 ENCOUNTER — TELEPHONE (OUTPATIENT)
Dept: FAMILY MEDICINE CLINIC | Age: 76
End: 2021-07-29

## 2021-07-29 NOTE — TELEPHONE ENCOUNTER
Leisa Friend from [place] [phone]    Called to verify that  would be signing patient death certificate    Please advise    Provider out of office

## 2021-07-29 NOTE — TELEPHONE ENCOUNTER
This is not a [facility] pt. Pt was scheduled to see [facility] as a new pt and no showed every time. Pt was last seen in the office by Dr. Juvenal Judge back on 9/11/2018. Per [facility] send this to Dr. Bob Vaughn. She is unsure what needs to be done since pt has not been seen in over 3 years.

## 2021-08-03 NOTE — PROGRESS NOTES
Physician Progress Note      PATIENT:               Oneida Matthew  [facility] #:                  [id number]  :                       1945  ADMIT DATE:       2021 2:45 PM  [address] DATE:        2021 1:29 PM  RESPONDING  PROVIDER #:        [address] [place]          QUERY TEXT:    Dear Anette Canavan APRN CNP,    Patient admitted with shortness of breath. Noted documentation of Acute   Kidney Injury in  on 21. In order to support the diagnosis of SANAM,   please include additional clinical indicators in your documentation. Or   please document if the diagnosis of SANAM has been ruled out after further study    The medical record reflects the following:  Risk Factors: History of hydronephrosis. Clinical Indicators: Per discharge summary note, SANAM on CKD advancing treated   with IV hydration with Neph consulted. ACE was discontinued and hydralazine   was added for BP control, needs OP follow up, Scr was 1.8 on day of DC. Per   Neph note of 21, SANAM on CKD 3 advancing severe, Scr was 1.8 in 2019, 2.4   in , improved t 1.8 to 2.0 at this time, etiology of SANAM presumed   pre-renal for now with resent history of hydronephrosis. Scr noted as 1.9 on   admission of 719 with a rise noted to 2.1 during stay. Treatment: IV fluids, Neph consult, medication adjustment. Defined by Kidney Disease Improving Global Outcomes (KDIGO) clinical practice   guideline for acute kidney injury:  -Increase in SCr by greater than or equal to 0.3 mg/dl within 48 hours; or  -Increase or decrease in SCr to greater than or equal to 1.5 times baseline,   which is known or presumed to have occurred within the prior 7 days; or  -Urine volume < 0.5ml/kg/h for 6 hours    Thank you. Vear Halsted RN CDS  Options provided:  -- Acute kidney injury evidenced by, Please document evidence as well as   baseline creatinine, if known.   -- Acute kidney injury ruled out after study  -- Other - I will add my own diagnosis  -- Disagree - Not applicable / Not valid  -- Disagree - Clinically unable to determine / Unknown  -- Refer to Clinical Documentation Reviewer    PROVIDER RESPONSE TEXT:    Acute kidney injury was ruled out after study.     Query created by: Clarissa Kapoor on 8/2/2021 7:55 AM      Electronically signed by:  Lis Marvin CNP 8/3/2021 4:52 PM

## 2023-07-24 NOTE — PLAN OF CARE
Problem: Falls - Risk of:  Goal: Will remain free from falls  Description  Will remain free from falls  Note:   Pt has remained free from any falls so far this shift. Pt refuses bed alarm but calls out when needs assistance. Bed in lowest and locked position. Belongings within reach. Will continue to monitor. Call light within reach. 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

## (undated) DEVICE — GLOVE ORANGE PI 7   MSG9070

## (undated) DEVICE — GUIDEWIRE ENDOSCP L150CM DIA0.035IN TIP 3CM PTFE NIT

## (undated) DEVICE — BAG DRAINAGE NS

## (undated) DEVICE — CYSTO PACK: Brand: MEDLINE INDUSTRIES, INC.

## (undated) DEVICE — Device: Brand: MEDEX

## (undated) DEVICE — CATHETER TRAY 16 FR 5 CC FOL ANTIREFLX SAMPLING PRT DOVER

## (undated) DEVICE — CATHETER URET 5FR L70CM OPN END SGL LUMN INJ HUB FLEXIMA

## (undated) DEVICE — SYRINGE MED 10ML SLIP TIP BLNT FILL AND LUERLOCK DISP

## (undated) DEVICE — TRAY PREP DRY W/ PREM GLV 2 APPL 6 SPNG 2 UNDPD 1 OVERWRAP

## (undated) DEVICE — POSITIONER HD W8XH4XL8.5IN RASPBERRY FOAM SLT

## (undated) DEVICE — Z DUP USE 2522782 SOLUTION IRRIG 1000ML STRL H2O PLAS CONTAINER UROMATIC

## (undated) DEVICE — SOLUTION IV IRRIG WATER 1000ML POUR BRL 2F7114